# Patient Record
Sex: MALE | Race: WHITE | Employment: UNEMPLOYED | URBAN - METROPOLITAN AREA
[De-identification: names, ages, dates, MRNs, and addresses within clinical notes are randomized per-mention and may not be internally consistent; named-entity substitution may affect disease eponyms.]

---

## 2017-01-10 RX ORDER — LEVOTHYROXINE SODIUM 88 UG/1
TABLET ORAL
Qty: 30 TABLET | Refills: 4 | Status: SHIPPED | OUTPATIENT
Start: 2017-01-10 | End: 2017-03-03 | Stop reason: DRUGHIGH

## 2017-01-31 ENCOUNTER — OFFICE VISIT (OUTPATIENT)
Dept: INTERNAL MEDICINE CLINIC | Age: 52
End: 2017-01-31

## 2017-01-31 VITALS
BODY MASS INDEX: 23.79 KG/M2 | SYSTOLIC BLOOD PRESSURE: 136 MMHG | WEIGHT: 138.6 LBS | HEART RATE: 72 BPM | DIASTOLIC BLOOD PRESSURE: 88 MMHG

## 2017-01-31 DIAGNOSIS — Z79.01 CHRONIC ANTICOAGULATION: ICD-10-CM

## 2017-01-31 DIAGNOSIS — D68.59 THROMBOPHILIA (HCC): ICD-10-CM

## 2017-01-31 DIAGNOSIS — Z86.711 HISTORY OF PULMONARY EMBOLISM: ICD-10-CM

## 2017-01-31 DIAGNOSIS — F72 MENTAL RETARDATION, SEVERE (I.Q. 20-34): Primary | ICD-10-CM

## 2017-01-31 DIAGNOSIS — R47.01 MUTISM: ICD-10-CM

## 2017-01-31 PROCEDURE — 99213 OFFICE O/P EST LOW 20 MIN: CPT | Performed by: INTERNAL MEDICINE

## 2017-03-01 DIAGNOSIS — R79.89 LOW TSH LEVEL: ICD-10-CM

## 2017-03-01 DIAGNOSIS — Z79.01 CHRONIC ANTICOAGULATION: ICD-10-CM

## 2017-03-02 ENCOUNTER — TELEPHONE (OUTPATIENT)
Dept: INTERNAL MEDICINE CLINIC | Age: 52
End: 2017-03-02

## 2017-03-03 ENCOUNTER — OFFICE VISIT (OUTPATIENT)
Dept: INTERNAL MEDICINE CLINIC | Age: 52
End: 2017-03-03

## 2017-03-03 VITALS
SYSTOLIC BLOOD PRESSURE: 112 MMHG | BODY MASS INDEX: 23.55 KG/M2 | RESPIRATION RATE: 16 BRPM | WEIGHT: 137.2 LBS | DIASTOLIC BLOOD PRESSURE: 70 MMHG | HEART RATE: 56 BPM

## 2017-03-03 DIAGNOSIS — Z79.01 CHRONIC ANTICOAGULATION: ICD-10-CM

## 2017-03-03 DIAGNOSIS — Q90.9 DOWN'S SYNDROME: ICD-10-CM

## 2017-03-03 DIAGNOSIS — D68.59 THROMBOPHILIA (HCC): ICD-10-CM

## 2017-03-03 DIAGNOSIS — E03.9 HYPOTHYROIDISM, UNSPECIFIED TYPE: Primary | ICD-10-CM

## 2017-03-03 DIAGNOSIS — Z86.718 HISTORY OF DVT (DEEP VEIN THROMBOSIS): ICD-10-CM

## 2017-03-03 DIAGNOSIS — R47.01 MUTISM: ICD-10-CM

## 2017-03-03 DIAGNOSIS — Z86.711 HISTORY OF PULMONARY EMBOLISM: ICD-10-CM

## 2017-03-03 PROCEDURE — 99214 OFFICE O/P EST MOD 30 MIN: CPT | Performed by: INTERNAL MEDICINE

## 2017-03-03 RX ORDER — LEVOTHYROXINE SODIUM 88 UG/1
TABLET ORAL
COMMUNITY
End: 2017-08-17 | Stop reason: SDUPTHER

## 2017-05-17 ENCOUNTER — TELEPHONE (OUTPATIENT)
Dept: INTERNAL MEDICINE CLINIC | Age: 52
End: 2017-05-17

## 2017-05-18 ENCOUNTER — OFFICE VISIT (OUTPATIENT)
Dept: INTERNAL MEDICINE CLINIC | Age: 52
End: 2017-05-18

## 2017-05-18 VITALS
SYSTOLIC BLOOD PRESSURE: 110 MMHG | HEART RATE: 68 BPM | DIASTOLIC BLOOD PRESSURE: 70 MMHG | RESPIRATION RATE: 16 BRPM | BODY MASS INDEX: 23.1 KG/M2 | WEIGHT: 134.6 LBS

## 2017-05-18 DIAGNOSIS — Z86.711 HISTORY OF PULMONARY EMBOLISM: ICD-10-CM

## 2017-05-18 DIAGNOSIS — R47.01 MUTISM: ICD-10-CM

## 2017-05-18 DIAGNOSIS — E03.9 HYPOTHYROIDISM, UNSPECIFIED TYPE: Primary | ICD-10-CM

## 2017-05-18 DIAGNOSIS — D68.59 THROMBOPHILIA (HCC): ICD-10-CM

## 2017-05-18 DIAGNOSIS — F72 MENTAL RETARDATION, SEVERE (I.Q. 20-34): ICD-10-CM

## 2017-05-18 DIAGNOSIS — Z79.01 CHRONIC ANTICOAGULATION: ICD-10-CM

## 2017-05-18 PROCEDURE — 99213 OFFICE O/P EST LOW 20 MIN: CPT | Performed by: INTERNAL MEDICINE

## 2017-06-12 RX ORDER — LEVOTHYROXINE SODIUM 88 UG/1
TABLET ORAL
Qty: 30 TABLET | Refills: 3 | Status: SHIPPED | OUTPATIENT
Start: 2017-06-12 | End: 2017-09-19 | Stop reason: SDUPTHER

## 2017-06-12 RX ORDER — RIVAROXABAN 20 MG/1
TABLET, FILM COATED ORAL
Qty: 30 TABLET | Refills: 4 | Status: SHIPPED | OUTPATIENT
Start: 2017-06-12 | End: 2017-10-18 | Stop reason: SDUPTHER

## 2017-07-07 DIAGNOSIS — E03.9 HYPOTHYROIDISM, UNSPECIFIED TYPE: ICD-10-CM

## 2017-08-17 ENCOUNTER — TELEPHONE (OUTPATIENT)
Dept: INTERNAL MEDICINE CLINIC | Age: 52
End: 2017-08-17

## 2017-08-17 ENCOUNTER — HOSPITAL ENCOUNTER (OUTPATIENT)
Dept: GENERAL RADIOLOGY | Age: 52
Discharge: HOME | End: 2017-08-17
Attending: INTERNAL MEDICINE | Admitting: INTERNAL MEDICINE

## 2017-08-17 ENCOUNTER — OFFICE VISIT (OUTPATIENT)
Dept: INTERNAL MEDICINE CLINIC | Age: 52
End: 2017-08-17

## 2017-08-17 VITALS
BODY MASS INDEX: 22.83 KG/M2 | SYSTOLIC BLOOD PRESSURE: 112 MMHG | DIASTOLIC BLOOD PRESSURE: 80 MMHG | RESPIRATION RATE: 16 BRPM | WEIGHT: 133 LBS | HEART RATE: 80 BPM

## 2017-08-17 DIAGNOSIS — H01.009 SEBORRHEIC BLEPHARITIS, UNSPECIFIED LATERALITY: Primary | ICD-10-CM

## 2017-08-17 DIAGNOSIS — R53.83 FATIGUE, UNSPECIFIED TYPE: ICD-10-CM

## 2017-08-17 DIAGNOSIS — Z11.4 SCREENING FOR HIV (HUMAN IMMUNODEFICIENCY VIRUS): ICD-10-CM

## 2017-08-17 DIAGNOSIS — E03.9 HYPOTHYROIDISM, UNSPECIFIED TYPE: ICD-10-CM

## 2017-08-17 DIAGNOSIS — Z79.01 CHRONIC ANTICOAGULATION: ICD-10-CM

## 2017-08-17 DIAGNOSIS — H10.13 ALLERGIC CONJUNCTIVITIS, BILATERAL: ICD-10-CM

## 2017-08-17 DIAGNOSIS — Z11.59 ENCOUNTER FOR HEPATITIS C SCREENING TEST FOR LOW RISK PATIENT: ICD-10-CM

## 2017-08-17 DIAGNOSIS — L21.9 SEBORRHEA: ICD-10-CM

## 2017-08-17 LAB
ALBUMIN SERPL-MCNC: 3.9 GM/DL (ref 3.4–5)
ALP BLD-CCNC: 83 IU/L (ref 40–128)
ALT SERPL-CCNC: 20 U/L (ref 10–40)
ANION GAP SERPL CALCULATED.3IONS-SCNC: 13 MMOL/L (ref 4–16)
AST SERPL-CCNC: 21 IU/L (ref 15–37)
BASOPHILS ABSOLUTE: 0.1 K/CU MM
BASOPHILS RELATIVE PERCENT: 1 % (ref 0–1)
BILIRUB SERPL-MCNC: 0.4 MG/DL (ref 0–1)
BUN BLDV-MCNC: 9 MG/DL (ref 6–23)
CALCIUM SERPL-MCNC: 8.8 MG/DL (ref 8.3–10.6)
CHLORIDE BLD-SCNC: 97 MMOL/L (ref 99–110)
CO2: 29 MMOL/L (ref 21–32)
CREAT SERPL-MCNC: 0.9 MG/DL (ref 0.9–1.3)
DIFFERENTIAL TYPE: ABNORMAL
EOSINOPHILS ABSOLUTE: 0 K/CU MM
EOSINOPHILS RELATIVE PERCENT: 0.4 % (ref 0–3)
GFR AFRICAN AMERICAN: >60 ML/MIN/1.73M2
GFR NON-AFRICAN AMERICAN: >60 ML/MIN/1.73M2
GLUCOSE BLD-MCNC: 93 MG/DL (ref 70–140)
HCT VFR BLD CALC: 45.8 % (ref 42–52)
HEMOGLOBIN: 15.7 GM/DL (ref 13.5–18)
HEPATITIS C ANTIBODY: NON REACTIVE
IMMATURE NEUTROPHIL %: 0.4 % (ref 0–0.43)
LYMPHOCYTES ABSOLUTE: 1.1 K/CU MM
LYMPHOCYTES RELATIVE PERCENT: 21.4 % (ref 24–44)
MCH RBC QN AUTO: 34.5 PG (ref 27–31)
MCHC RBC AUTO-ENTMCNC: 34.3 % (ref 32–36)
MCV RBC AUTO: 100.7 FL (ref 78–100)
MONOCYTES ABSOLUTE: 0.4 K/CU MM
MONOCYTES RELATIVE PERCENT: 8.8 % (ref 0–4)
NUCLEATED RBC %: 0 %
PDW BLD-RTO: 13.2 % (ref 11.7–14.9)
PLATELET # BLD: 341 K/CU MM (ref 140–440)
PMV BLD AUTO: 9.8 FL (ref 7.5–11.1)
POTASSIUM SERPL-SCNC: 5 MMOL/L (ref 3.5–5.1)
RBC # BLD: 4.55 M/CU MM (ref 4.6–6.2)
SEGMENTED NEUTROPHILS ABSOLUTE COUNT: 3.3 K/CU MM
SEGMENTED NEUTROPHILS RELATIVE PERCENT: 68 % (ref 36–66)
SODIUM BLD-SCNC: 139 MMOL/L (ref 135–145)
TOTAL IMMATURE NEUTOROPHIL: 0.02 K/CU MM
TOTAL NUCLEATED RBC: 0 K/CU MM
TOTAL PROTEIN: 7 GM/DL (ref 6.4–8.2)
WBC # BLD: 4.9 K/CU MM (ref 4–10.5)

## 2017-08-17 PROCEDURE — 99214 OFFICE O/P EST MOD 30 MIN: CPT | Performed by: INTERNAL MEDICINE

## 2017-08-17 RX ORDER — DESONIDE 0.5 MG/G
CREAM TOPICAL
Qty: 1 TUBE | Refills: 1 | Status: SHIPPED | OUTPATIENT
Start: 2017-08-17 | End: 2017-08-24 | Stop reason: ALTCHOICE

## 2017-08-17 RX ORDER — OLOPATADINE HYDROCHLORIDE 1 MG/ML
1 SOLUTION/ DROPS OPHTHALMIC 2 TIMES DAILY
Qty: 1 BOTTLE | Refills: 0 | Status: SHIPPED | OUTPATIENT
Start: 2017-08-17 | End: 2017-08-24 | Stop reason: CLARIF

## 2017-08-17 RX ORDER — TOBRAMYCIN AND DEXAMETHASONE 3; 1 MG/ML; MG/ML
1 SUSPENSION/ DROPS OPHTHALMIC 3 TIMES DAILY
Qty: 1 BOTTLE | Refills: 0 | Status: SHIPPED | OUTPATIENT
Start: 2017-08-17 | End: 2017-08-27

## 2017-08-18 LAB — HIV SCREEN: NON REACTIVE

## 2017-08-24 ENCOUNTER — OFFICE VISIT (OUTPATIENT)
Dept: INTERNAL MEDICINE CLINIC | Age: 52
End: 2017-08-24

## 2017-08-24 VITALS
WEIGHT: 134.4 LBS | RESPIRATION RATE: 16 BRPM | BODY MASS INDEX: 23.07 KG/M2 | HEART RATE: 56 BPM | DIASTOLIC BLOOD PRESSURE: 74 MMHG | SYSTOLIC BLOOD PRESSURE: 126 MMHG

## 2017-08-24 DIAGNOSIS — F72 MENTAL RETARDATION, SEVERE (I.Q. 20-34): ICD-10-CM

## 2017-08-24 DIAGNOSIS — E07.9 THYROID DISEASE: ICD-10-CM

## 2017-08-24 DIAGNOSIS — L21.9 SEBORRHEA: Primary | ICD-10-CM

## 2017-08-24 DIAGNOSIS — R47.01 MUTISM: ICD-10-CM

## 2017-08-24 DIAGNOSIS — H01.009 SEBORRHEIC BLEPHARITIS, UNSPECIFIED LATERALITY: ICD-10-CM

## 2017-08-24 PROCEDURE — 99213 OFFICE O/P EST LOW 20 MIN: CPT | Performed by: INTERNAL MEDICINE

## 2017-08-24 RX ORDER — KETOCONAZOLE 20 MG/G
CREAM TOPICAL
Qty: 30 G | Refills: 1 | Status: SHIPPED | OUTPATIENT
Start: 2017-08-24 | End: 2018-08-07 | Stop reason: ALTCHOICE

## 2017-09-19 RX ORDER — LEVOTHYROXINE SODIUM 88 UG/1
TABLET ORAL
Qty: 30 TABLET | Refills: 2 | Status: SHIPPED | OUTPATIENT
Start: 2017-09-19 | End: 2018-01-11 | Stop reason: SDUPTHER

## 2017-10-11 ENCOUNTER — TELEPHONE (OUTPATIENT)
Dept: INTERNAL MEDICINE CLINIC | Age: 52
End: 2017-10-11

## 2017-10-11 RX ORDER — OLOPATADINE HYDROCHLORIDE 1 MG/ML
1 SOLUTION/ DROPS OPHTHALMIC 2 TIMES DAILY
Qty: 1 BOTTLE | Refills: 0 | Status: SHIPPED | OUTPATIENT
Start: 2017-10-11 | End: 2017-11-10

## 2017-10-11 NOTE — TELEPHONE ENCOUNTER
Flor Galloway called from [facility] stating that patient's eyes are red again. She states that this comes and goes, thinks allergy related. Please advise.

## 2017-10-19 RX ORDER — RIVAROXABAN 20 MG/1
TABLET, FILM COATED ORAL
Qty: 30 TABLET | Refills: 3 | Status: SHIPPED | OUTPATIENT
Start: 2017-10-19 | End: 2018-03-06 | Stop reason: SDUPTHER

## 2017-11-17 ENCOUNTER — OFFICE VISIT (OUTPATIENT)
Dept: INTERNAL MEDICINE CLINIC | Age: 52
End: 2017-11-17

## 2017-11-17 VITALS
DIASTOLIC BLOOD PRESSURE: 88 MMHG | WEIGHT: 136.4 LBS | BODY MASS INDEX: 23.41 KG/M2 | SYSTOLIC BLOOD PRESSURE: 134 MMHG | RESPIRATION RATE: 16 BRPM | HEART RATE: 68 BPM

## 2017-11-17 DIAGNOSIS — F72 MENTAL RETARDATION, SEVERE (I.Q. 20-34): ICD-10-CM

## 2017-11-17 DIAGNOSIS — E03.9 HYPOTHYROIDISM, UNSPECIFIED TYPE: ICD-10-CM

## 2017-11-17 DIAGNOSIS — Z86.718 HISTORY OF DVT IN ADULTHOOD: Primary | ICD-10-CM

## 2017-11-17 DIAGNOSIS — R47.01 MUTISM: ICD-10-CM

## 2017-11-17 PROCEDURE — 99213 OFFICE O/P EST LOW 20 MIN: CPT | Performed by: INTERNAL MEDICINE

## 2017-11-17 RX ORDER — M-VIT,TX,IRON,MINS/CALC/FOLIC 27MG-0.4MG
1 TABLET ORAL DAILY
COMMUNITY
End: 2020-08-20 | Stop reason: SDUPTHER

## 2017-11-17 NOTE — PROGRESS NOTES
12/2012    Coumadin    Mutism     Pulmonary embolism ([facility] [address]     Questionable; before 2005    Thrombophilia ([facility] [address] 12/2012    High Factor VIII; anticardiolipin IgA; MTHFR mutation heterozygous        Social History   Substance Use Topics    Smoking status: Never Smoker    Smokeless tobacco: Never Used    Alcohol use No        ROS: The patient has had no headache, sore throat, fever or chills, cough, dyspnea, chest pain, nausea, vomiting or diarrhea, or edema. Objective:      /88   Pulse 68   Resp 16   Wt 136 lb 6.4 oz (61.9 kg)   BMI 23.41 kg/m²    General: in no apparent distress   The patient's neck is free of nodes. Lungs are clear. Heart is normal in rate and regular in rhythm. Legs are free of edema. No rash or erythema. Aug lab rev'd  CXR from Aug rev'd and normal     Assessment / Plan:      1. History of DVT in adulthood    2. Hypothyroidism, unspecified type    3. Down's Syndrome    4.  Mutism            Plan   His last colonoscopy was with Dr Bob Hi in 2008 for hematochezia; normal  Will be due next yr    Same meds  Papers from caregiver reviewed and completed  RTC 3 mo

## 2018-01-11 RX ORDER — LEVOTHYROXINE SODIUM 88 UG/1
88 TABLET ORAL DAILY
Qty: 30 TABLET | Refills: 2 | Status: SHIPPED | OUTPATIENT
Start: 2018-01-11 | End: 2018-03-06 | Stop reason: SDUPTHER

## 2018-02-15 ENCOUNTER — OFFICE VISIT (OUTPATIENT)
Dept: INTERNAL MEDICINE CLINIC | Age: 53
End: 2018-02-15

## 2018-02-15 VITALS
WEIGHT: 137.2 LBS | DIASTOLIC BLOOD PRESSURE: 70 MMHG | HEART RATE: 64 BPM | SYSTOLIC BLOOD PRESSURE: 128 MMHG | BODY MASS INDEX: 23.55 KG/M2 | RESPIRATION RATE: 16 BRPM

## 2018-02-15 DIAGNOSIS — I82.4Y2 ACUTE DEEP VEIN THROMBOSIS (DVT) OF PROXIMAL VEIN OF LEFT LOWER EXTREMITY (HCC): ICD-10-CM

## 2018-02-15 DIAGNOSIS — R47.01 MUTISM: ICD-10-CM

## 2018-02-15 DIAGNOSIS — D68.59 THROMBOPHILIA (HCC): Primary | ICD-10-CM

## 2018-02-15 DIAGNOSIS — F72 MENTAL RETARDATION, SEVERE (I.Q. 20-34): ICD-10-CM

## 2018-02-15 DIAGNOSIS — Z86.711 HISTORY OF PULMONARY EMBOLISM: ICD-10-CM

## 2018-02-15 DIAGNOSIS — Z79.01 CHRONIC ANTICOAGULATION: ICD-10-CM

## 2018-02-15 PROCEDURE — 99213 OFFICE O/P EST LOW 20 MIN: CPT | Performed by: INTERNAL MEDICINE

## 2018-02-15 NOTE — PROGRESS NOTES
Subjective:      Gemini Pool is a 45 y.o. male who presents today for follow up on his chronic medical conditions as noted below. Patient Active Problem List:     Down's Syndrome     Hypothyroidism     Mutism     Colon cancer screening     Thrombophilia ([facility] [address]     Prostate cancer screening     Chronic anticoagulation     The patient is taking all meds as prescribed without side effects. Functional status and activity level is unchanged. There are no new complaints. No behavioral problems    Eating well and no elimination problems    Patient denies any exertional chest pain, dyspnea, palpitations, syncope, orthopnea, edema or paroxysmal nocturnal dyspnea. Taking xarelto w/o bleeding or bruising   No leg edema    No symptoms of hypo or hyperthyroidism: no decreased or increased weight, no feeling cold/chilly or excessively warm, no diarrhea or constipation, no undue sweatiness, anxiety or palpitations. Lasts TSH 0.876 in 5/2017    Discussed with caregiver Susi to ask his POA about screening colonoscopy   Last done 9/2008  She agreed to do so    Current Outpatient Prescriptions   Medication Sig Dispense Refill    levothyroxine (SYNTHROID) 88 MCG tablet Take 1 tablet by mouth daily 30 tablet 2    Multiple Vitamins-Minerals (THERAPEUTIC MULTIVITAMIN-MINERALS) tablet Take 1 tablet by mouth daily      XARELTO 20 MG TABS tablet TAKE 1 TABLET BY MOUTH ONCE DAILY WITH BREAKFAST 30 tablet 3    ketoconazole (NIZORAL) 2 % cream Apply topically to cheeks, brow every other day for the month of September; begin Sept 2 30 g 1    Multiple Vitamins-Minerals (MULTIVITAL PO) daily.  psyllium (METAMUCIL SMOOTH TEXTURE) 28 % packet Take 1 packet by mouth daily. Take with full glass of h20 or juice        No current facility-administered medications for this visit.           Past Medical History:   Diagnosis Date    Chronic anticoagulation 12/2012    Deviated septum     Down's Syndrome     Severe MRDD, Nonverbal

## 2018-03-06 RX ORDER — LEVOTHYROXINE SODIUM 88 UG/1
TABLET ORAL
Qty: 60 TABLET | Refills: 1 | Status: SHIPPED | OUTPATIENT
Start: 2018-03-06 | End: 2018-07-18 | Stop reason: SDUPTHER

## 2018-03-06 RX ORDER — RIVAROXABAN 20 MG/1
TABLET, FILM COATED ORAL
Qty: 30 TABLET | Refills: 2 | Status: SHIPPED | OUTPATIENT
Start: 2018-03-06 | End: 2018-05-30 | Stop reason: SDUPTHER

## 2018-04-02 RX ORDER — MULTIVITAMIN,THERAPEUTIC
TABLET ORAL
Qty: 30 TABLET | Refills: 3 | Status: SHIPPED | OUTPATIENT
Start: 2018-04-02 | End: 2018-07-18 | Stop reason: SDUPTHER

## 2018-05-10 ENCOUNTER — OFFICE VISIT (OUTPATIENT)
Dept: INTERNAL MEDICINE CLINIC | Age: 53
End: 2018-05-10

## 2018-05-10 VITALS
RESPIRATION RATE: 16 BRPM | WEIGHT: 141.6 LBS | BODY MASS INDEX: 24.17 KG/M2 | HEART RATE: 60 BPM | SYSTOLIC BLOOD PRESSURE: 108 MMHG | DIASTOLIC BLOOD PRESSURE: 62 MMHG | HEIGHT: 64 IN

## 2018-05-10 DIAGNOSIS — Z12.5 PROSTATE CANCER SCREENING: ICD-10-CM

## 2018-05-10 DIAGNOSIS — Z79.01 CHRONIC ANTICOAGULATION: ICD-10-CM

## 2018-05-10 DIAGNOSIS — Z23 NEED FOR TDAP VACCINATION: ICD-10-CM

## 2018-05-10 DIAGNOSIS — E78.2 MIXED HYPERLIPIDEMIA: ICD-10-CM

## 2018-05-10 DIAGNOSIS — E07.9 THYROID DISEASE: ICD-10-CM

## 2018-05-10 DIAGNOSIS — R47.01 MUTISM: ICD-10-CM

## 2018-05-10 DIAGNOSIS — D68.59 THROMBOPHILIA (HCC): ICD-10-CM

## 2018-05-10 DIAGNOSIS — Z86.711 HISTORY OF PULMONARY EMBOLISM: ICD-10-CM

## 2018-05-10 DIAGNOSIS — F72 MENTAL RETARDATION, SEVERE (I.Q. 20-34): Primary | ICD-10-CM

## 2018-05-10 PROCEDURE — 99213 OFFICE O/P EST LOW 20 MIN: CPT | Performed by: INTERNAL MEDICINE

## 2018-05-10 PROCEDURE — 90471 IMMUNIZATION ADMIN: CPT | Performed by: INTERNAL MEDICINE

## 2018-05-10 PROCEDURE — 90715 TDAP VACCINE 7 YRS/> IM: CPT | Performed by: INTERNAL MEDICINE

## 2018-05-10 ASSESSMENT — ENCOUNTER SYMPTOMS
SPUTUM PRODUCTION: 0
BLURRED VISION: 0
ABDOMINAL PAIN: 0
VOMITING: 0
COUGH: 0
SORE THROAT: 0
DOUBLE VISION: 0
HEARTBURN: 0
SHORTNESS OF BREATH: 0
BACK PAIN: 0
NAUSEA: 0
DIARRHEA: 0

## 2018-05-31 RX ORDER — RIVAROXABAN 20 MG/1
TABLET, FILM COATED ORAL
Qty: 30 TABLET | Refills: 1 | Status: SHIPPED | OUTPATIENT
Start: 2018-05-31 | End: 2018-07-18 | Stop reason: SDUPTHER

## 2018-07-18 RX ORDER — MULTIVITAMIN,THERAPEUTIC
TABLET ORAL
Qty: 90 TABLET | Refills: 2 | Status: SHIPPED | OUTPATIENT
Start: 2018-07-18 | End: 2018-08-07 | Stop reason: SDUPTHER

## 2018-07-18 RX ORDER — RIVAROXABAN 20 MG/1
TABLET, FILM COATED ORAL
Qty: 30 TABLET | Refills: 0 | Status: SHIPPED | OUTPATIENT
Start: 2018-07-18 | End: 2018-08-21 | Stop reason: SDUPTHER

## 2018-07-18 RX ORDER — LEVOTHYROXINE SODIUM 88 UG/1
TABLET ORAL
Qty: 30 TABLET | Refills: 0 | Status: SHIPPED | OUTPATIENT
Start: 2018-07-18 | End: 2018-08-07 | Stop reason: DRUGHIGH

## 2018-08-07 ENCOUNTER — OFFICE VISIT (OUTPATIENT)
Dept: INTERNAL MEDICINE CLINIC | Age: 53
End: 2018-08-07

## 2018-08-07 VITALS
DIASTOLIC BLOOD PRESSURE: 80 MMHG | WEIGHT: 140 LBS | OXYGEN SATURATION: 98 % | RESPIRATION RATE: 16 BRPM | HEART RATE: 70 BPM | SYSTOLIC BLOOD PRESSURE: 118 MMHG | BODY MASS INDEX: 24.03 KG/M2

## 2018-08-07 DIAGNOSIS — E07.9 THYROID DISEASE: Primary | ICD-10-CM

## 2018-08-07 DIAGNOSIS — F72 MENTAL RETARDATION, SEVERE (I.Q. 20-34): ICD-10-CM

## 2018-08-07 DIAGNOSIS — R47.01 MUTISM: ICD-10-CM

## 2018-08-07 DIAGNOSIS — Z79.01 CHRONIC ANTICOAGULATION: ICD-10-CM

## 2018-08-07 DIAGNOSIS — D68.59 THROMBOPHILIA (HCC): ICD-10-CM

## 2018-08-07 PROCEDURE — 99213 OFFICE O/P EST LOW 20 MIN: CPT | Performed by: INTERNAL MEDICINE

## 2018-08-07 RX ORDER — LEVOTHYROXINE SODIUM 88 UG/1
TABLET ORAL
COMMUNITY
End: 2019-08-30 | Stop reason: ALTCHOICE

## 2018-08-07 ASSESSMENT — PATIENT HEALTH QUESTIONNAIRE - PHQ9
2. FEELING DOWN, DEPRESSED OR HOPELESS: 0
SUM OF ALL RESPONSES TO PHQ9 QUESTIONS 1 & 2: 0
1. LITTLE INTEREST OR PLEASURE IN DOING THINGS: 0
SUM OF ALL RESPONSES TO PHQ QUESTIONS 1-9: 0

## 2018-08-07 NOTE — PROGRESS NOTES
Subjective:      Malka Guzman is a 52 y.o. male who presents today for follow up on his chronic medical conditions as noted below. Patient Active Problem List:     Down's Syndrome     Hypothyroidism     Mutism     Colon cancer screening     Thrombophilia ([facility] [address]     Prostate cancer screening     Chronic anticoagulation     History of pulmonary embolism     Left leg DVT ([facility] [address]     He was last seen 3 mo ago    Susi brought him in today and states he is working well with no behavior issues    No symptoms of hypo or hyperthyroidism: no decreased or increased weight, no feeling cold/chilly or excessively warm, no diarrhea or constipation, no undue sweatiness, anxiety or palpitations. TSH in Aug 4.44    No bruising or bleeding from chronic anticaog for thrombophilia and DVT hx    No leg edema    Current Outpatient Prescriptions   Medication Sig Dispense Refill    levothyroxine (SYNTHROID) 88 MCG tablet One tablet daily, except one half tablet Mondays      XARELTO 20 MG TABS tablet TAKE 1 TABLET BY MOUTH ONCE DAILY WITH BREAKFAST 30 tablet 0    Multiple Vitamins-Minerals (THERAPEUTIC MULTIVITAMIN-MINERALS) tablet Take 1 tablet by mouth daily      psyllium (METAMUCIL SMOOTH TEXTURE) 28 % packet Take 1 packet by mouth daily. Take with full glass of h20 or juice        No current facility-administered medications for this visit.         Past Medical History:   Diagnosis Date    Chronic anticoagulation 12/2012    Deviated septum     Down's Syndrome     Severe MRDD, Nonverbal    Flexural eczema 7/2014    elocon    Hypothyroidism     Left leg DVT ([facility] [address] 12/2012    Coumadin    Mutism     Pulmonary embolism ([facility] [address]     Questionable; before 2005    Thrombophilia ([facility] [address] 12/2012    High Factor VIII; anticardiolipin IgA; MTHFR mutation heterozygous        Social History   Substance Use Topics    Smoking status: Never Smoker    Smokeless tobacco: Never Used    Alcohol use No        ROS: The patient has had no headache,

## 2018-08-21 RX ORDER — RIVAROXABAN 20 MG/1
TABLET, FILM COATED ORAL
Qty: 30 TABLET | Refills: 3 | Status: SHIPPED | OUTPATIENT
Start: 2018-08-21 | End: 2018-12-11 | Stop reason: SDUPTHER

## 2018-08-21 RX ORDER — LEVOTHYROXINE SODIUM 88 UG/1
TABLET ORAL
Qty: 30 TABLET | Refills: 3 | Status: SHIPPED | OUTPATIENT
Start: 2018-08-21 | End: 2018-10-19 | Stop reason: SDUPTHER

## 2018-10-19 RX ORDER — LEVOTHYROXINE SODIUM 88 UG/1
TABLET ORAL
Qty: 90 TABLET | Refills: 1 | Status: SHIPPED | OUTPATIENT
Start: 2018-10-19 | End: 2018-11-01 | Stop reason: SDUPTHER

## 2018-11-01 ENCOUNTER — OFFICE VISIT (OUTPATIENT)
Dept: INTERNAL MEDICINE CLINIC | Age: 53
End: 2018-11-01
Payer: MEDICAID

## 2018-11-01 VITALS
OXYGEN SATURATION: 100 % | WEIGHT: 143 LBS | SYSTOLIC BLOOD PRESSURE: 112 MMHG | DIASTOLIC BLOOD PRESSURE: 70 MMHG | BODY MASS INDEX: 24.55 KG/M2 | HEART RATE: 68 BPM | RESPIRATION RATE: 16 BRPM

## 2018-11-01 DIAGNOSIS — Z79.01 CHRONIC ANTICOAGULATION: ICD-10-CM

## 2018-11-01 DIAGNOSIS — E07.9 THYROID DISEASE: ICD-10-CM

## 2018-11-01 DIAGNOSIS — Z86.718 HISTORY OF DVT OF LOWER EXTREMITY: ICD-10-CM

## 2018-11-01 DIAGNOSIS — F72 MENTAL RETARDATION, SEVERE (I.Q. 20-34): ICD-10-CM

## 2018-11-01 DIAGNOSIS — H10.9 CONJUNCTIVITIS OF RIGHT EYE, UNSPECIFIED CONJUNCTIVITIS TYPE: Primary | ICD-10-CM

## 2018-11-01 PROCEDURE — 99213 OFFICE O/P EST LOW 20 MIN: CPT | Performed by: INTERNAL MEDICINE

## 2018-11-01 RX ORDER — TOBRAMYCIN AND DEXAMETHASONE 3; 1 MG/ML; MG/ML
1 SUSPENSION/ DROPS OPHTHALMIC 4 TIMES DAILY
Qty: 1 BOTTLE | Refills: 0 | Status: SHIPPED | OUTPATIENT
Start: 2018-11-01 | End: 2018-11-08

## 2018-11-01 NOTE — PROGRESS NOTES
Coumadin    Mutism     Pulmonary embolism (HCC)     Questionable; before 2005    Thrombophilia ([facility] [address] 12/2012    High Factor VIII; anticardiolipin IgA; MTHFR mutation heterozygous        Social History   Substance Use Topics    Smoking status: Never Smoker    Smokeless tobacco: Never Used    Alcohol use No        ROS: The patient has had no headache, sore throat, fever or chills, cough, dyspnea, chest pain, nausea, vomiting or diarrhea, or edema. Objective:      /70   Pulse 68   Resp 16   Wt 143 lb (64.9 kg)   SpO2 100%   BMI 24.55 kg/m²    General: in no apparent distress   Eyes as above; left normal  The patient's neck is free of nodes. Lungs are clear. Heart is normal in rate and regular in rhythm. Legs are free of edema. No rash or erythema. Aug lab rev'd  Assessment / Plan:      1. Conjunctivitis of right eye, unspecified conjunctivitis type    2. Hypothyroidism    3. Down's Syndrome    4. History of DVT of lower extremity    5.  Chronic anticoagulation            Plan   tobradex 1 gtt right eye QID x 7d  Same meds otherwise  RTC 3 mo

## 2018-12-11 RX ORDER — RIVAROXABAN 20 MG/1
TABLET, FILM COATED ORAL
Qty: 30 TABLET | Refills: 2 | Status: SHIPPED | OUTPATIENT
Start: 2018-12-11 | End: 2019-03-11 | Stop reason: SDUPTHER

## 2019-02-08 ENCOUNTER — OFFICE VISIT (OUTPATIENT)
Dept: INTERNAL MEDICINE CLINIC | Age: 54
End: 2019-02-08
Payer: MEDICAID

## 2019-02-08 VITALS
BODY MASS INDEX: 24.55 KG/M2 | HEART RATE: 69 BPM | SYSTOLIC BLOOD PRESSURE: 124 MMHG | WEIGHT: 143 LBS | DIASTOLIC BLOOD PRESSURE: 80 MMHG | RESPIRATION RATE: 16 BRPM | OXYGEN SATURATION: 98 %

## 2019-02-08 DIAGNOSIS — H04.123 DRY EYES: Primary | ICD-10-CM

## 2019-02-08 DIAGNOSIS — D68.59 THROMBOPHILIA (HCC): ICD-10-CM

## 2019-02-08 DIAGNOSIS — Z86.718 HISTORY OF DVT (DEEP VEIN THROMBOSIS): ICD-10-CM

## 2019-02-08 DIAGNOSIS — F72 MENTAL RETARDATION, SEVERE (I.Q. 20-34): ICD-10-CM

## 2019-02-08 DIAGNOSIS — E07.9 THYROID DISEASE: ICD-10-CM

## 2019-02-08 PROCEDURE — 99213 OFFICE O/P EST LOW 20 MIN: CPT | Performed by: INTERNAL MEDICINE

## 2019-03-11 RX ORDER — RIVAROXABAN 20 MG/1
TABLET, FILM COATED ORAL
Qty: 30 TABLET | Refills: 1 | Status: SHIPPED | OUTPATIENT
Start: 2019-03-11 | End: 2019-05-28 | Stop reason: SDUPTHER

## 2019-03-29 RX ORDER — MULTIVITAMIN,THERAPEUTIC
TABLET ORAL
Qty: 90 TABLET | Refills: 1 | Status: SHIPPED | OUTPATIENT
Start: 2019-03-29 | End: 2019-09-17 | Stop reason: SDUPTHER

## 2019-05-14 ENCOUNTER — OFFICE VISIT (OUTPATIENT)
Dept: INTERNAL MEDICINE CLINIC | Age: 54
End: 2019-05-14
Payer: MEDICAID

## 2019-05-14 VITALS
DIASTOLIC BLOOD PRESSURE: 82 MMHG | WEIGHT: 138.8 LBS | BODY MASS INDEX: 23.7 KG/M2 | HEIGHT: 64 IN | SYSTOLIC BLOOD PRESSURE: 136 MMHG | RESPIRATION RATE: 14 BRPM

## 2019-05-14 DIAGNOSIS — Z86.718 HISTORY OF DVT (DEEP VEIN THROMBOSIS): ICD-10-CM

## 2019-05-14 DIAGNOSIS — Z12.5 PROSTATE CANCER SCREENING: ICD-10-CM

## 2019-05-14 DIAGNOSIS — Z79.01 CHRONIC ANTICOAGULATION: ICD-10-CM

## 2019-05-14 DIAGNOSIS — E07.9 THYROID DISEASE: Primary | ICD-10-CM

## 2019-05-14 DIAGNOSIS — E78.2 MIXED HYPERLIPIDEMIA: ICD-10-CM

## 2019-05-14 PROCEDURE — 99213 OFFICE O/P EST LOW 20 MIN: CPT | Performed by: INTERNAL MEDICINE

## 2019-05-14 NOTE — PROGRESS NOTES
Subjective:      Ruthy Brice is a 52 y.o. male who presents today for follow up on his chronic medical conditions as noted below. Patient Active Problem List:     Down's Syndrome     Hypothyroidism     Mutism     Thrombophilia (HCC)     Chronic anticoagulation     History of pulmonary embolism     Left leg DVT ([address]   '  He was last seen in Feb    HE HAD HIS EARS CLEANED OUT BY ENT    No symptoms of hypo or hyperthyroidism: no decreased or increased weight, no feeling cold/chilly or excessively warm, no diarrhea or constipation, no undue sweatiness, anxiety or palpitations. 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    He will use theratears PRN redness of eyes    No leg pain, swelling or redness    No bleeding or bruising from chronci anticoag    Current Outpatient Medications   Medication Sig Dispense Refill    Multiple Vitamin (THERA/BETA-CAROTENE) TABS TAKE ONE (1) TABLET BY MOUTH ONCE DAILY 90 tablet 1    XARELTO 20 MG TABS tablet TAKE ONE (1) TABLET BY MOUTH ONCE DAILY WITH BREAKFAST 30 tablet 1    Carboxymethylcellulose Sodium (THERATEARS) 0.25 % SOLN Apply 1 drop to eye as needed ONE DROP EACH EYE 4 TIMES DAILY       levothyroxine (SYNTHROID) 88 MCG tablet One tablet daily, except one half tablet Mondays      Multiple Vitamins-Minerals (THERAPEUTIC MULTIVITAMIN-MINERALS) tablet Take 1 tablet by mouth daily      psyllium (METAMUCIL SMOOTH TEXTURE) 28 % packet Take 1 packet by mouth daily. Take with full glass of h20 or juice        No current facility-administered medications for this visit.             Past Medical History:   Diagnosis Date    Chronic anticoagulation 12/2012    Deviated septum     Down's Syndrome     Severe MRDD, Nonverbal    Flexural eczema 7/2014    elocon    Hypothyroidism     Left leg DVT ([address] 12/2012    Coumadin    Mutism     Pulmonary embolism (HCC)     Questionable; before 2005    Thrombophilia ([address] 12/2012    High Factor VIII; anticardiolipin IgA; MTHFR mutation heterozygous        Social History     Tobacco Use    Smoking status: Never Smoker    Smokeless tobacco: Never Used   Substance Use Topics    Alcohol use: No        ROS: The patient has had no headache, sore throat, fever or chills, cough, dyspnea, chest pain, nausea, vomiting or diarrhea, or edema. Objective:      /82 (Site: Right Upper Arm, Position: Sitting, Cuff Size: Medium Adult)   Resp 14   Ht 5' 4\" (1.626 m)   Wt 138 lb 12.8 oz (63 kg)   BMI 23.82 kg/m²      Physical Exam   Constitutional: He is oriented to person, place, and time. He appears well-developed and well-nourished. No distress. HENT:   Head: Normocephalic and atraumatic. Mouth/Throat: Oropharynx is clear and moist.   Eyes: Conjunctivae are normal. No scleral icterus. Neck: Neck supple. Cardiovascular: Normal rate and regular rhythm. No murmur heard. Pulmonary/Chest: Effort normal. No respiratory distress. He has no wheezes. He has no rales. Abdominal: Soft. He exhibits no distension. There is no tenderness. Musculoskeletal: Normal range of motion. Neurological: He is alert and oriented to person, place, and time. Coordination normal.   Skin: Skin is warm and dry. Psychiatric: He has a normal mood and affect. His behavior is normal.   Vitals reviewed. Labs from 8/2018 on chart and rev'd     Assessment / Plan:      1. Hypothyroidism    2. History of DVT (deep vein thrombosis)    3. Mixed hyperlipidemia    4. Chronic anticoagulation    5.  Prostate cancer screening            Plan   Same meds  RTC 3 mo, lab first  Orders Placed This Encounter   Procedures    CBC Auto Differential    Comprehensive Metabolic Panel    Lipid Panel    TSH without Reflex    Psa screening

## 2019-05-28 RX ORDER — RIVAROXABAN 20 MG/1
TABLET, FILM COATED ORAL
Qty: 30 TABLET | Refills: 0 | Status: SHIPPED | OUTPATIENT
Start: 2019-05-28 | End: 2019-06-21 | Stop reason: SDUPTHER

## 2019-06-24 RX ORDER — RIVAROXABAN 20 MG/1
TABLET, FILM COATED ORAL
Qty: 30 TABLET | Refills: 0 | Status: SHIPPED | OUTPATIENT
Start: 2019-06-24 | End: 2019-07-22 | Stop reason: SDUPTHER

## 2019-06-24 RX ORDER — LEVOTHYROXINE SODIUM 88 UG/1
TABLET ORAL
Qty: 90 TABLET | Refills: 0 | Status: SHIPPED | OUTPATIENT
Start: 2019-06-24 | End: 2019-08-21 | Stop reason: SDUPTHER

## 2019-07-22 RX ORDER — RIVAROXABAN 20 MG/1
TABLET, FILM COATED ORAL
Qty: 30 TABLET | Refills: 0 | Status: SHIPPED | OUTPATIENT
Start: 2019-07-22 | End: 2019-08-21 | Stop reason: SDUPTHER

## 2019-08-21 RX ORDER — RIVAROXABAN 20 MG/1
TABLET, FILM COATED ORAL
Qty: 30 TABLET | Refills: 0 | Status: SHIPPED | OUTPATIENT
Start: 2019-08-21 | End: 2019-09-17 | Stop reason: SDUPTHER

## 2019-08-21 RX ORDER — LEVOTHYROXINE SODIUM 88 UG/1
TABLET ORAL
Qty: 90 TABLET | Refills: 0 | Status: SHIPPED | OUTPATIENT
Start: 2019-08-21 | End: 2019-08-30

## 2019-08-30 ENCOUNTER — OFFICE VISIT (OUTPATIENT)
Dept: INTERNAL MEDICINE CLINIC | Age: 54
End: 2019-08-30
Payer: MEDICAID

## 2019-08-30 VITALS
WEIGHT: 143.6 LBS | RESPIRATION RATE: 16 BRPM | BODY MASS INDEX: 24.65 KG/M2 | OXYGEN SATURATION: 98 % | DIASTOLIC BLOOD PRESSURE: 76 MMHG | HEART RATE: 67 BPM | SYSTOLIC BLOOD PRESSURE: 132 MMHG

## 2019-08-30 DIAGNOSIS — E03.9 HYPOTHYROIDISM, UNSPECIFIED TYPE: Primary | ICD-10-CM

## 2019-08-30 DIAGNOSIS — R47.01 MUTISM: ICD-10-CM

## 2019-08-30 DIAGNOSIS — Z86.718 HISTORY OF DVT (DEEP VEIN THROMBOSIS): ICD-10-CM

## 2019-08-30 DIAGNOSIS — F72 SEVERE INTELLECTUAL DISABILITY WITH INTELLIGENCE QUOTIENT 20 TO 34: ICD-10-CM

## 2019-08-30 PROCEDURE — 99213 OFFICE O/P EST LOW 20 MIN: CPT | Performed by: INTERNAL MEDICINE

## 2019-08-30 RX ORDER — LEVOTHYROXINE SODIUM 0.1 MG/1
100 TABLET ORAL DAILY
Qty: 30 TABLET | Refills: 5 | Status: SHIPPED | OUTPATIENT
Start: 2019-08-30 | End: 2020-02-04

## 2019-08-30 ASSESSMENT — PATIENT HEALTH QUESTIONNAIRE - PHQ9
1. LITTLE INTEREST OR PLEASURE IN DOING THINGS: 0
SUM OF ALL RESPONSES TO PHQ9 QUESTIONS 1 & 2: 0
SUM OF ALL RESPONSES TO PHQ QUESTIONS 1-9: 0
SUM OF ALL RESPONSES TO PHQ QUESTIONS 1-9: 0
2. FEELING DOWN, DEPRESSED OR HOPELESS: 0

## 2019-09-17 RX ORDER — MULTIVITAMIN,THERAPEUTIC
TABLET ORAL
Qty: 90 TABLET | Refills: 0 | Status: SHIPPED | OUTPATIENT
Start: 2019-09-17 | End: 2019-12-13 | Stop reason: SDUPTHER

## 2019-09-17 RX ORDER — RIVAROXABAN 20 MG/1
TABLET, FILM COATED ORAL
Qty: 30 TABLET | Refills: 0 | Status: SHIPPED | OUTPATIENT
Start: 2019-09-17 | End: 2019-10-18 | Stop reason: SDUPTHER

## 2019-11-22 ENCOUNTER — OFFICE VISIT (OUTPATIENT)
Dept: INTERNAL MEDICINE CLINIC | Age: 54
End: 2019-11-22
Payer: MEDICAID

## 2019-11-22 VITALS
WEIGHT: 146.2 LBS | BODY MASS INDEX: 24.96 KG/M2 | SYSTOLIC BLOOD PRESSURE: 122 MMHG | HEIGHT: 64 IN | DIASTOLIC BLOOD PRESSURE: 70 MMHG | OXYGEN SATURATION: 96 % | HEART RATE: 73 BPM

## 2019-11-22 DIAGNOSIS — E07.9 THYROID DISEASE: Primary | ICD-10-CM

## 2019-11-22 DIAGNOSIS — F72 SEVERE INTELLECTUAL DISABILITY WITH INTELLIGENCE QUOTIENT 20 TO 34: ICD-10-CM

## 2019-11-22 DIAGNOSIS — Z86.711 HISTORY OF PULMONARY EMBOLISM: ICD-10-CM

## 2019-11-22 DIAGNOSIS — R47.01 MUTISM: ICD-10-CM

## 2019-11-22 PROCEDURE — 99213 OFFICE O/P EST LOW 20 MIN: CPT | Performed by: INTERNAL MEDICINE

## 2019-12-13 RX ORDER — MULTIVITAMIN,THERAPEUTIC
TABLET ORAL
Qty: 90 TABLET | Refills: 0 | Status: SHIPPED | OUTPATIENT
Start: 2019-12-13 | End: 2020-03-30

## 2019-12-13 RX ORDER — RIVAROXABAN 20 MG/1
TABLET, FILM COATED ORAL
Qty: 30 TABLET | Refills: 0 | Status: SHIPPED | OUTPATIENT
Start: 2019-12-13 | End: 2020-01-07

## 2020-01-07 RX ORDER — RIVAROXABAN 20 MG/1
TABLET, FILM COATED ORAL
Qty: 30 TABLET | Refills: 0 | Status: SHIPPED | OUTPATIENT
Start: 2020-01-07 | End: 2020-02-04

## 2020-02-04 RX ORDER — RIVAROXABAN 20 MG/1
TABLET, FILM COATED ORAL
Qty: 30 TABLET | Refills: 0 | Status: SHIPPED | OUTPATIENT
Start: 2020-02-04 | End: 2020-03-05

## 2020-02-04 RX ORDER — LEVOTHYROXINE SODIUM 0.1 MG/1
TABLET ORAL
Qty: 90 TABLET | Refills: 4 | Status: SHIPPED | OUTPATIENT
Start: 2020-02-04 | End: 2020-08-20 | Stop reason: SDUPTHER

## 2020-02-20 ENCOUNTER — OFFICE VISIT (OUTPATIENT)
Dept: INTERNAL MEDICINE CLINIC | Age: 55
End: 2020-02-20
Payer: MEDICAID

## 2020-02-20 VITALS
SYSTOLIC BLOOD PRESSURE: 128 MMHG | WEIGHT: 146.2 LBS | OXYGEN SATURATION: 97 % | HEART RATE: 62 BPM | BODY MASS INDEX: 25.08 KG/M2 | DIASTOLIC BLOOD PRESSURE: 78 MMHG

## 2020-02-20 PROCEDURE — 99213 OFFICE O/P EST LOW 20 MIN: CPT | Performed by: FAMILY MEDICINE

## 2020-02-20 ASSESSMENT — PATIENT HEALTH QUESTIONNAIRE - PHQ9
SUM OF ALL RESPONSES TO PHQ QUESTIONS 1-9: 0
1. LITTLE INTEREST OR PLEASURE IN DOING THINGS: 0
2. FEELING DOWN, DEPRESSED OR HOPELESS: 0
SUM OF ALL RESPONSES TO PHQ QUESTIONS 1-9: 0
SUM OF ALL RESPONSES TO PHQ9 QUESTIONS 1 & 2: 0

## 2020-02-20 NOTE — PROGRESS NOTES
Subjective:      Chief Complaint   Patient presents with    Other     follow up       HPI:  Magnus Salomon is a 62 y.o. male who presents today to establish care with new provider. Caretaker present with patient. PMH as below. No acute concerns today. Past Medical History:   Diagnosis Date    Chronic anticoagulation 12/2012    Deviated septum     Down's Syndrome     Severe MRDD, Nonverbal    Flexural eczema 7/2014    elocon    Hypothyroidism     Left leg DVT ([facility] [address] 12/2012    Coumadin    Mutism     Pulmonary embolism (HCC)     Questionable; before 2005    Thrombophilia ([facility] [address] 12/2012    High Factor VIII; anticardiolipin IgA; MTHFR mutation heterozygous        No past surgical history on file. Social History     Tobacco Use    Smoking status: Never Smoker    Smokeless tobacco: Never Used   Substance Use Topics    Alcohol use: No        Review of Systems   Unable to perform ROS: Patient nonverbal        Prior to Visit Medications    Medication Sig Taking? Authorizing Provider   XARELTO 20 MG TABS tablet TAKE 1 TABLET BY MOUTH ONCE DAILY WITH BREAKFAST Yes Lilly Fleischer, MD   levothyroxine (SYNTHROID) 100 MCG tablet TAKE 1 TABLET BY MOUTH ONCE DAILY Yes Lilly Fleischer, MD   Multiple Vitamin (THERA/BETA-CAROTENE) TABS TAKE ONE (1) TABLET BY MOUTH ONCE DAILY Yes Lilly Fleischer, MD   Multiple Vitamins-Minerals (THERAPEUTIC MULTIVITAMIN-MINERALS) tablet Take 1 tablet by mouth daily Yes Historical Provider, MD   psyllium (METAMUCIL SMOOTH TEXTURE) 28 % packet Take 1 packet by mouth daily. Take with full glass of h20 or juice  Yes Historical Provider, MD          Objective:      /78   Pulse 62   Wt 146 lb 3.2 oz (66.3 kg)   SpO2 97%   BMI 25.08 kg/m²      Physical Exam  Vitals signs and nursing note reviewed. Constitutional:       General: He is not in acute distress. Appearance: Normal appearance. He is not ill-appearing or toxic-appearing.    HENT:      Head: Normocephalic and atraumatic. Right Ear: External ear normal.      Left Ear: External ear normal.      Nose: Nose normal.      Mouth/Throat:      Pharynx: Oropharynx is clear. Eyes:      General: No scleral icterus. Right eye: No discharge. Left eye: No discharge. Extraocular Movements: Extraocular movements intact. Conjunctiva/sclera: Conjunctivae normal.   Neck:      Musculoskeletal: Normal range of motion and neck supple. No neck rigidity. Cardiovascular:      Rate and Rhythm: Normal rate and regular rhythm. Heart sounds: Normal heart sounds. Pulmonary:      Effort: Pulmonary effort is normal.      Breath sounds: Normal breath sounds. No wheezing or rales. Musculoskeletal:         General: No deformity. Skin:     General: Skin is warm and dry. Findings: No rash. Neurological:      General: No focal deficit present. Mental Status: He is alert. Mental status is at baseline. Motor: No weakness. Psychiatric:         Mood and Affect: Mood normal.         Behavior: Behavior normal.            Assessment / Plan:      1. General medical exam  Up to date on screening. Paperwork completed. Follow up in 3 months. 2. Hypothyroidism  Last TSH wnl. Continue current dose of synthroid. Patient voiced understanding and agreement with plan. All questions/concerns were addressed, risks/side effects of medications were reviewed. Return precautions and after visit summary were provided. Return in about 3 months (around 5/20/2020). or earlier as needed.       Elroy Leslie MD

## 2020-03-05 RX ORDER — RIVAROXABAN 20 MG/1
TABLET, FILM COATED ORAL
Qty: 30 TABLET | Refills: 0 | Status: SHIPPED | OUTPATIENT
Start: 2020-03-05 | End: 2020-03-30

## 2020-03-30 RX ORDER — MULTIVITAMIN,THERAPEUTIC
TABLET ORAL
Qty: 90 TABLET | Refills: 0 | Status: SHIPPED | OUTPATIENT
Start: 2020-03-30 | End: 2020-06-22

## 2020-03-30 RX ORDER — RIVAROXABAN 20 MG/1
TABLET, FILM COATED ORAL
Qty: 30 TABLET | Refills: 0 | Status: SHIPPED | OUTPATIENT
Start: 2020-03-30 | End: 2020-04-28

## 2020-04-28 RX ORDER — RIVAROXABAN 20 MG/1
TABLET, FILM COATED ORAL
Qty: 30 TABLET | Refills: 0 | Status: SHIPPED | OUTPATIENT
Start: 2020-04-28 | End: 2020-05-28

## 2020-05-28 RX ORDER — RIVAROXABAN 20 MG/1
TABLET, FILM COATED ORAL
Qty: 30 TABLET | Refills: 0 | Status: SHIPPED | OUTPATIENT
Start: 2020-05-28 | End: 2020-06-22

## 2020-06-22 RX ORDER — MULTIVITAMIN,THERAPEUTIC
TABLET ORAL
Qty: 90 TABLET | Refills: 0 | Status: SHIPPED | OUTPATIENT
Start: 2020-06-22 | End: 2020-08-20 | Stop reason: SDUPTHER

## 2020-06-22 RX ORDER — RIVAROXABAN 20 MG/1
TABLET, FILM COATED ORAL
Qty: 30 TABLET | Refills: 3 | Status: SHIPPED | OUTPATIENT
Start: 2020-06-22 | End: 2020-08-20 | Stop reason: SDUPTHER

## 2020-08-20 ENCOUNTER — OFFICE VISIT (OUTPATIENT)
Dept: INTERNAL MEDICINE CLINIC | Age: 55
End: 2020-08-20
Payer: MEDICAID

## 2020-08-20 VITALS
WEIGHT: 144.6 LBS | OXYGEN SATURATION: 96 % | SYSTOLIC BLOOD PRESSURE: 132 MMHG | DIASTOLIC BLOOD PRESSURE: 80 MMHG | BODY MASS INDEX: 24.81 KG/M2 | HEART RATE: 71 BPM | TEMPERATURE: 98.1 F

## 2020-08-20 PROCEDURE — 99214 OFFICE O/P EST MOD 30 MIN: CPT | Performed by: FAMILY MEDICINE

## 2020-08-20 RX ORDER — MULTIVITAMIN,THERAPEUTIC
TABLET ORAL
Qty: 90 TABLET | Refills: 0 | Status: SHIPPED | OUTPATIENT
Start: 2020-08-20 | End: 2020-11-19 | Stop reason: ALTCHOICE

## 2020-08-20 RX ORDER — M-VIT,TX,IRON,MINS/CALC/FOLIC 27MG-0.4MG
1 TABLET ORAL DAILY
Qty: 90 TABLET | Refills: 3 | Status: SHIPPED | OUTPATIENT
Start: 2020-08-20 | End: 2020-11-19 | Stop reason: SDUPTHER

## 2020-08-20 RX ORDER — LEVOTHYROXINE SODIUM 0.1 MG/1
TABLET ORAL
Qty: 90 TABLET | Refills: 4 | Status: SHIPPED | OUTPATIENT
Start: 2020-08-20 | End: 2020-11-19 | Stop reason: SDUPTHER

## 2020-08-20 NOTE — PROGRESS NOTES
distress. Appearance: Normal appearance. He is not ill-appearing or toxic-appearing. HENT:      Head: Normocephalic and atraumatic. Right Ear: External ear normal.      Left Ear: External ear normal.      Nose: Nose normal.      Mouth/Throat:      Pharynx: Oropharynx is clear. Eyes:      General: No scleral icterus. Right eye: No discharge. Left eye: No discharge. Extraocular Movements: Extraocular movements intact. Conjunctiva/sclera: Conjunctivae normal.   Neck:      Musculoskeletal: Normal range of motion and neck supple. No neck rigidity. Cardiovascular:      Rate and Rhythm: Normal rate and regular rhythm. Heart sounds: Normal heart sounds. Pulmonary:      Effort: Pulmonary effort is normal.      Breath sounds: Normal breath sounds. No wheezing or rales. Musculoskeletal:         General: No deformity. Skin:     General: Skin is warm and dry. Findings: No rash. Neurological:      General: No focal deficit present. Mental Status: He is alert. Mental status is at baseline. Motor: No weakness. Psychiatric:         Mood and Affect: Mood normal.         Behavior: Behavior normal.            Assessment / Plan:      1. General medical exam  Due for labs. Paperwork completed. - CBC Auto Differential; Future  - Comprehensive Metabolic Panel; Future  - Lipid Panel; Future    2. Hypothyroidism, unspecified type  Last TSH wnl. Will monitor labs, continue current dose of synthroid. - TSH without Reflex; Future          Patient voiced understanding and agreement with plan. All questions/concerns were addressed, risks/side effects of medications were reviewed. Return precautions and after visit summary were provided. Return in about 3 months (around 11/20/2020). or earlier as needed.       Nirmal Nassar MD

## 2020-11-03 ENCOUNTER — HOSPITAL ENCOUNTER (OUTPATIENT)
Age: 55
Discharge: HOME | End: 2020-11-03
Payer: MEDICAID

## 2020-11-03 LAB
ALBUMIN SERPL-MCNC: 4 GM/DL (ref 3.4–5)
ALP BLD-CCNC: 72 IU/L (ref 40–128)
ALT SERPL-CCNC: 17 U/L (ref 10–40)
ANION GAP SERPL CALCULATED.3IONS-SCNC: 8 MMOL/L (ref 4–16)
AST SERPL-CCNC: 20 IU/L (ref 15–37)
BASOPHILS ABSOLUTE: 0.1 K/CU MM
BASOPHILS RELATIVE PERCENT: 0.9 % (ref 0–1)
BILIRUB SERPL-MCNC: 0.3 MG/DL (ref 0–1)
BUN BLDV-MCNC: 13 MG/DL (ref 6–23)
CALCIUM SERPL-MCNC: 9.1 MG/DL (ref 8.3–10.6)
CHLORIDE BLD-SCNC: 97 MMOL/L (ref 99–110)
CHOLESTEROL: 166 MG/DL
CO2: 29 MMOL/L (ref 21–32)
CREAT SERPL-MCNC: 0.9 MG/DL (ref 0.9–1.3)
DIFFERENTIAL TYPE: ABNORMAL
EOSINOPHILS ABSOLUTE: 0 K/CU MM
EOSINOPHILS RELATIVE PERCENT: 0.6 % (ref 0–3)
GFR AFRICAN AMERICAN: >60 ML/MIN/1.73M2
GFR NON-AFRICAN AMERICAN: >60 ML/MIN/1.73M2
GLUCOSE BLD-MCNC: 96 MG/DL (ref 70–99)
HCT VFR BLD CALC: 47.9 % (ref 42–52)
HDLC SERPL-MCNC: 59 MG/DL
HEMOGLOBIN: 15.8 GM/DL (ref 13.5–18)
IMMATURE NEUTROPHIL %: 0.6 % (ref 0–0.43)
LDL CHOLESTEROL DIRECT: 103 MG/DL
LYMPHOCYTES ABSOLUTE: 1.1 K/CU MM
LYMPHOCYTES RELATIVE PERCENT: 21 % (ref 24–44)
MCH RBC QN AUTO: 33.5 PG (ref 27–31)
MCHC RBC AUTO-ENTMCNC: 33 % (ref 32–36)
MCV RBC AUTO: 101.7 FL (ref 78–100)
MONOCYTES ABSOLUTE: 0.6 K/CU MM
MONOCYTES RELATIVE PERCENT: 11.5 % (ref 0–4)
NUCLEATED RBC %: 0 %
PDW BLD-RTO: 13 % (ref 11.7–14.9)
PLATELET # BLD: 373 K/CU MM (ref 140–440)
PMV BLD AUTO: 9.5 FL (ref 7.5–11.1)
POTASSIUM SERPL-SCNC: 5.4 MMOL/L (ref 3.5–5.1)
RBC # BLD: 4.71 M/CU MM (ref 4.6–6.2)
SEGMENTED NEUTROPHILS ABSOLUTE COUNT: 3.5 K/CU MM
SEGMENTED NEUTROPHILS RELATIVE PERCENT: 65.4 % (ref 36–66)
SODIUM BLD-SCNC: 134 MMOL/L (ref 135–145)
TOTAL IMMATURE NEUTOROPHIL: 0.03 K/CU MM
TOTAL NUCLEATED RBC: 0 K/CU MM
TOTAL PROTEIN: 6.4 GM/DL (ref 6.4–8.2)
TRIGL SERPL-MCNC: 73 MG/DL
TSH HIGH SENSITIVITY: 1.4 UIU/ML (ref 0.27–4.2)
WBC # BLD: 5.4 K/CU MM (ref 4–10.5)

## 2020-11-03 PROCEDURE — 80053 COMPREHEN METABOLIC PANEL: CPT

## 2020-11-03 PROCEDURE — 83721 ASSAY OF BLOOD LIPOPROTEIN: CPT

## 2020-11-03 PROCEDURE — 36415 COLL VENOUS BLD VENIPUNCTURE: CPT

## 2020-11-03 PROCEDURE — 80061 LIPID PANEL: CPT

## 2020-11-03 PROCEDURE — 84443 ASSAY THYROID STIM HORMONE: CPT

## 2020-11-03 PROCEDURE — 85025 COMPLETE CBC W/AUTO DIFF WBC: CPT

## 2020-11-19 ENCOUNTER — OFFICE VISIT (OUTPATIENT)
Dept: INTERNAL MEDICINE CLINIC | Age: 55
End: 2020-11-19
Payer: MEDICAID

## 2020-11-19 VITALS
HEART RATE: 68 BPM | SYSTOLIC BLOOD PRESSURE: 130 MMHG | TEMPERATURE: 97.9 F | DIASTOLIC BLOOD PRESSURE: 80 MMHG | BODY MASS INDEX: 25.08 KG/M2 | OXYGEN SATURATION: 98 % | WEIGHT: 146.2 LBS

## 2020-11-19 PROCEDURE — 99214 OFFICE O/P EST MOD 30 MIN: CPT | Performed by: FAMILY MEDICINE

## 2020-11-19 RX ORDER — LEVOTHYROXINE SODIUM 0.1 MG/1
TABLET ORAL
Qty: 90 TABLET | Refills: 3 | Status: SHIPPED | OUTPATIENT
Start: 2020-11-19 | End: 2021-02-22 | Stop reason: SDUPTHER

## 2020-11-19 RX ORDER — M-VIT,TX,IRON,MINS/CALC/FOLIC 27MG-0.4MG
1 TABLET ORAL DAILY
Qty: 90 TABLET | Refills: 3 | Status: SHIPPED | OUTPATIENT
Start: 2020-11-19 | End: 2020-11-19 | Stop reason: ALTCHOICE

## 2020-11-19 NOTE — PROGRESS NOTES
Subjective:      Chief Complaint   Patient presents with    3 Month Follow-Up    Results     labs    Other     dry skin around both ears around neck worse on left today       HPI:  Becky Armando is a 60 y.o. male who presents today for follow up of chronic conditions as listed below. Has had issues with frequent bowel movements for a long time since being started on metamucil. Would like to try discontinuing as it is interfering with his daily functioning. Has had a rash under his ears bilaterally. Comes and goes, does not seem to bother him. Group home is looking to transition to on site on call and caretaker is requesting documentation that patient needs 24 hour supervision. Otherwise doing well. No acute concerns. Labs reviewed. Past Medical History:   Diagnosis Date    Chronic anticoagulation 12/2012    Deviated septum     Down's Syndrome     Severe MRDD, Nonverbal    Flexural eczema 7/2014    elocon    Hypothyroidism     Left leg DVT ([facility] [address] 12/2012    Coumadin    Mutism     Pulmonary embolism (HCC)     Questionable; before 2005    Thrombophilia ([facility] [address] 12/2012    High Factor VIII; anticardiolipin IgA; MTHFR mutation heterozygous        No past surgical history on file. Social History     Tobacco Use    Smoking status: Never Smoker    Smokeless tobacco: Never Used   Substance Use Topics    Alcohol use: No        Review of Systems   Constitutional: Negative for activity change, appetite change, chills, fever and unexpected weight change. HENT: Negative for congestion and sore throat. Respiratory: Negative for chest tightness and shortness of breath. Cardiovascular: Negative for chest pain and palpitations. Gastrointestinal: Negative for abdominal pain, constipation, diarrhea and vomiting. Genitourinary: Negative for dysuria. Skin: Positive for rash. Negative for color change. Neurological: Negative for dizziness and light-headedness. Psychiatric/Behavioral: Negative for dysphoric mood. The patient is not nervous/anxious. Prior to Visit Medications    Medication Sig Taking? Authorizing Provider   Multiple Vitamin (THERA/BETA-CAROTENE) TABS TAKE 1 TABLET BY MOUTH ONCE DAILY  Sepideh Carr MD   rivaroxaban (XARELTO) 20 MG TABS tablet TAKE 1 TABLET BY MOUTH ONCE DAILY WITH BREAKFAST  Sepideh Carr MD   levothyroxine (SYNTHROID) 100 MCG tablet TAKE 1 TABLET BY MOUTH ONCE DAILY  Sepideh Carr MD   Multiple Vitamins-Minerals (THERAPEUTIC MULTIVITAMIN-MINERALS) tablet Take 1 tablet by mouth daily  Sepideh Carr MD   psyllium (METAMUCIL SMOOTH TEXTURE) 28 % packet Take 1 packet by mouth daily Take with full glass of h20 or juice  Sepideh Carr MD          Objective:      /80   Pulse 68   Temp 97.9 °F (36.6 °C)   Wt 146 lb 3.2 oz (66.3 kg)   SpO2 98%   BMI 25.08 kg/m²      Physical Exam  Vitals signs and nursing note reviewed. Constitutional:       General: He is not in acute distress. Appearance: Normal appearance. He is not ill-appearing or toxic-appearing. HENT:      Head: Normocephalic and atraumatic. Right Ear: External ear normal.      Left Ear: External ear normal.      Nose: Nose normal.      Mouth/Throat:      Pharynx: Oropharynx is clear. Eyes:      General: No scleral icterus. Right eye: No discharge. Left eye: No discharge. Extraocular Movements: Extraocular movements intact. Conjunctiva/sclera: Conjunctivae normal.   Neck:      Musculoskeletal: Normal range of motion and neck supple. No neck rigidity. Cardiovascular:      Rate and Rhythm: Normal rate and regular rhythm. Heart sounds: Normal heart sounds. Pulmonary:      Effort: Pulmonary effort is normal.      Breath sounds: Normal breath sounds. No wheezing or rales. Musculoskeletal:         General: No deformity. Skin:     General: Skin is warm and dry.       Findings: Rash (patches of dry, erythema under ears bilaterally) present. Neurological:      General: No focal deficit present. Mental Status: He is alert. Mental status is at baseline. Motor: No weakness. Psychiatric:         Mood and Affect: Mood normal.         Behavior: Behavior normal.            Assessment / Plan:      1. Frequent bowel movements  Since being started on metamucil, requesting to discontinue. May stop, contact clinic if constipation recurs. 2. Hyperkalemia  No obvious cause. Discussed avoiding high potassium foods, will try stopping MVI and repeat lab in a few weeks. - POTASSIUM; Future    3. Rash  Likely atopic dermatitis vs. psoriasis. Advised topical steroids but caretaker does not think he would tolerate topical medication well (patient does not like being touched). Reports rash does not seem to bother patient, prefers to monitor for now. 4. Hypothyroidism  Last TSH wnl, continue current dose of synthroid. - levothyroxine (SYNTHROID) 100 MCG tablet; TAKE 1 TABLET BY MOUTH ONCE DAILY  Dispense: 90 tablet; Refill: 3    5. History of pulmonary embolism  Continue xarelto. - rivaroxaban (XARELTO) 20 MG TABS tablet; TAKE 1 TABLET BY MOUTH ONCE DAILY WITH BREAKFAST  Dispense: 90 tablet; Refill: 3         I would recommend patient to have 24 hour supervision due to his medical history. Patient voiced understanding and agreement with plan. All questions/concerns were addressed, risks/side effects of medications were reviewed. Return precautions and after visit summary were provided. Return in about 3 months (around 2/19/2021). or earlier as needed.       Charles Cormier MD

## 2020-11-20 ASSESSMENT — ENCOUNTER SYMPTOMS
DIARRHEA: 0
CHEST TIGHTNESS: 0
ABDOMINAL PAIN: 0
SORE THROAT: 0
COLOR CHANGE: 0
CONSTIPATION: 0
VOMITING: 0
SHORTNESS OF BREATH: 0

## 2021-01-15 LAB — POTASSIUM (K+): 4.7

## 2021-01-20 DIAGNOSIS — E87.5 HYPERKALEMIA: ICD-10-CM

## 2021-02-22 ENCOUNTER — VIRTUAL VISIT (OUTPATIENT)
Dept: INTERNAL MEDICINE CLINIC | Age: 56
End: 2021-02-22
Payer: MEDICAID

## 2021-02-22 DIAGNOSIS — E07.9 THYROID DISEASE: Primary | ICD-10-CM

## 2021-02-22 DIAGNOSIS — E87.5 HYPERKALEMIA: ICD-10-CM

## 2021-02-22 DIAGNOSIS — Z86.711 HISTORY OF PULMONARY EMBOLISM: ICD-10-CM

## 2021-02-22 PROCEDURE — 99214 OFFICE O/P EST MOD 30 MIN: CPT | Performed by: FAMILY MEDICINE

## 2021-02-22 RX ORDER — LEVOTHYROXINE SODIUM 0.1 MG/1
TABLET ORAL
Qty: 90 TABLET | Refills: 3 | Status: SHIPPED | OUTPATIENT
Start: 2021-02-22 | End: 2021-05-20 | Stop reason: SDUPTHER

## 2021-02-22 SDOH — ECONOMIC STABILITY: FOOD INSECURITY: WITHIN THE PAST 12 MONTHS, YOU WORRIED THAT YOUR FOOD WOULD RUN OUT BEFORE YOU GOT MONEY TO BUY MORE.: NEVER TRUE

## 2021-02-22 SDOH — ECONOMIC STABILITY: INCOME INSECURITY: HOW HARD IS IT FOR YOU TO PAY FOR THE VERY BASICS LIKE FOOD, HOUSING, MEDICAL CARE, AND HEATING?: NOT HARD AT ALL

## 2021-02-22 ASSESSMENT — PATIENT HEALTH QUESTIONNAIRE - PHQ9
1. LITTLE INTEREST OR PLEASURE IN DOING THINGS: 0
SUM OF ALL RESPONSES TO PHQ QUESTIONS 1-9: 0
SUM OF ALL RESPONSES TO PHQ QUESTIONS 1-9: 0

## 2021-02-22 NOTE — PROGRESS NOTES
2021    TELEHEALTH EVALUATION -- Audio/Visual (During LXDIR-23 public health emergency)    HPI:    Leo Dunbar (:  1965) has requested an audio/video evaluation for the following concern(s):    Leo Dunbar is a 60 y.o. male who presents today for follow up of chronic conditions as listed below. Spoke with Quoc Arambula (caregiver at patient's group home). States patient has been doing well. Has discontinued his multivitamin and metamucil since his last appointment. Patient is still having regular bowel movements without any issues. Is not sure whether he has ever had a colonoscopy, [facility] patients have to get permission/authorization from their guardian for procedures like that, which tends to be difficult. No acute concerns today. Review of Systems   Unable to perform ROS: Patient nonverbal       Prior to Visit Medications    Medication Sig Taking? Authorizing Provider   rivaroxaban (XARELTO) 20 MG TABS tablet TAKE 1 TABLET BY MOUTH ONCE DAILY WITH BREAKFAST Yes Ronnie Dominguez MD   levothyroxine (SYNTHROID) 100 MCG tablet TAKE 1 TABLET BY MOUTH ONCE DAILY Yes Ronnie Dominguez MD       Social History     Tobacco Use    Smoking status: Never Smoker    Smokeless tobacco: Never Used   Substance Use Topics    Alcohol use: No    Drug use: No        Past Medical History:   Diagnosis Date    Chronic anticoagulation 2012    Deviated septum     Down's Syndrome     Severe MRDD, Nonverbal    Flexural eczema 2014    elocon    Hypothyroidism     Left leg DVT ([facility] [address] 2012    Coumadin    Mutism     Pulmonary embolism ([facility] [address]     Questionable; before 2005    Thrombophilia ([facility] [address] 2012    High Factor VIII; anticardiolipin IgA; MTHFR mutation heterozygous         ASSESSMENT/PLAN:  1. Hypothyroidism  Last TSH wnl. Will monitor labs, continue current dose of synthroid. - levothyroxine (SYNTHROID) 100 MCG tablet; TAKE 1 TABLET BY MOUTH ONCE DAILY  Dispense: 90 tablet;  Refill: 3 2. History of pulmonary embolism  - rivaroxaban (XARELTO) 20 MG TABS tablet; TAKE 1 TABLET BY MOUTH ONCE DAILY WITH BREAKFAST  Dispense: 90 tablet; Refill: 3    3. Hyperkalemia  Now normalized. Return in about 3 months (around 5/22/2021). Yared Ramos is a 60 y.o. male being evaluated by a Virtual Visit (video visit) encounter to address concerns as mentioned above. A caregiver was present when appropriate. Due to this being a TeleHealth encounter (During JAV-64 public health emergency), evaluation of the following organ systems was limited: Vitals/Constitutional/EENT/Resp/CV/GI//MS/Neuro/Skin/Heme-Lymph-Imm. Pursuant to the emergency declaration under the [address] [place] [address], [address] [place] [address] authority and the [employer] and [employer] Act, this Virtual Visit was conducted with patient's (and/or legal guardian's) consent, to reduce the patient's risk of exposure to COVID-19 and provide necessary medical care. The patient (and/or legal guardian) has also been advised to contact this office for worsening conditions or problems, and seek emergency medical treatment and/or call 911 if deemed necessary. Patient identification was verified at the start of the visit: Yes    Total time spent on this encounter: 15 min    Services were provided through a video synchronous discussion virtually to substitute for in-person clinic visit. Patient and provider were located at their individual homes. --Clifford Leiva MD on 2/22/2021 at 5:06 PM    An electronic signature was used to authenticate this note.

## 2021-05-20 ENCOUNTER — OFFICE VISIT (OUTPATIENT)
Dept: INTERNAL MEDICINE CLINIC | Age: 56
End: 2021-05-20
Payer: MEDICAID

## 2021-05-20 VITALS
OXYGEN SATURATION: 100 % | DIASTOLIC BLOOD PRESSURE: 66 MMHG | HEIGHT: 60 IN | SYSTOLIC BLOOD PRESSURE: 124 MMHG | WEIGHT: 142 LBS | TEMPERATURE: 98.1 F | HEART RATE: 63 BPM | BODY MASS INDEX: 27.88 KG/M2

## 2021-05-20 DIAGNOSIS — E07.9 THYROID DISEASE: Primary | ICD-10-CM

## 2021-05-20 DIAGNOSIS — Z00.00 GENERAL MEDICAL EXAM: ICD-10-CM

## 2021-05-20 DIAGNOSIS — Z86.711 HISTORY OF PULMONARY EMBOLISM: ICD-10-CM

## 2021-05-20 PROCEDURE — 99214 OFFICE O/P EST MOD 30 MIN: CPT | Performed by: FAMILY MEDICINE

## 2021-05-20 RX ORDER — LEVOTHYROXINE SODIUM 0.1 MG/1
TABLET ORAL
Qty: 90 TABLET | Refills: 3 | Status: SHIPPED | OUTPATIENT
Start: 2021-05-20 | End: 2021-08-17 | Stop reason: SDUPTHER

## 2021-05-20 NOTE — PROGRESS NOTES
Subjective:      Chief Complaint   Patient presents with    Annual Exam    Hypothyroidism       HPI:  Ashanti Wild is a 60 y.o. male who presents today for follow up of chronic conditions as listed below. Caregiver reports patient is doing very well, no acute issues or concerns today. No changes in medications. Not seeing any specialists. Past Medical History:   Diagnosis Date    Chronic anticoagulation 12/2012    Deviated septum     Down's Syndrome     Severe MRDD, Nonverbal    Flexural eczema 7/2014    elocon    Hypothyroidism     Left leg DVT ([facility] [address] 12/2012    Coumadin    Mutism     Pulmonary embolism (HCC)     Questionable; before 2005    Thrombophilia ([facility] [address] 12/2012    High Factor VIII; anticardiolipin IgA; MTHFR mutation heterozygous        No past surgical history on file. Social History     Tobacco Use    Smoking status: Never Smoker    Smokeless tobacco: Never Used   Substance Use Topics    Alcohol use: No        Review of Systems   Unable to perform ROS: Patient nonverbal        Prior to Visit Medications    Medication Sig Taking? Authorizing Provider   rivaroxaban (XARELTO) 20 MG TABS tablet TAKE 1 TABLET BY MOUTH ONCE DAILY WITH BREAKFAST Yes Ed Mcneil MD   levothyroxine (SYNTHROID) 100 MCG tablet TAKE 1 TABLET BY MOUTH ONCE DAILY Yes Ed Mcneil MD          Objective:      /66   Pulse 63   Temp 98.1 °F (36.7 °C)   Ht 5' (1.524 m)   Wt 142 lb (64.4 kg)   SpO2 100%   BMI 27.73 kg/m²      Physical Exam  Vitals and nursing note reviewed. Constitutional:       General: He is not in acute distress. Appearance: Normal appearance. He is not ill-appearing or toxic-appearing. HENT:      Head: Normocephalic and atraumatic. Right Ear: External ear normal.      Left Ear: External ear normal.      Nose: Nose normal.      Mouth/Throat:      Pharynx: Oropharynx is clear. Eyes:      General: No scleral icterus. Right eye: No discharge.

## 2021-08-17 ENCOUNTER — OFFICE VISIT (OUTPATIENT)
Dept: INTERNAL MEDICINE CLINIC | Age: 56
End: 2021-08-17
Payer: MEDICAID

## 2021-08-17 VITALS
OXYGEN SATURATION: 95 % | WEIGHT: 144 LBS | SYSTOLIC BLOOD PRESSURE: 124 MMHG | BODY MASS INDEX: 28.27 KG/M2 | HEART RATE: 75 BPM | DIASTOLIC BLOOD PRESSURE: 86 MMHG | HEIGHT: 60 IN | TEMPERATURE: 97.2 F

## 2021-08-17 DIAGNOSIS — E07.9 THYROID DISEASE: Primary | ICD-10-CM

## 2021-08-17 DIAGNOSIS — D68.59 THROMBOPHILIA (HCC): ICD-10-CM

## 2021-08-17 DIAGNOSIS — Z86.711 HISTORY OF PULMONARY EMBOLISM: ICD-10-CM

## 2021-08-17 DIAGNOSIS — E78.5 HYPERLIPIDEMIA, UNSPECIFIED HYPERLIPIDEMIA TYPE: ICD-10-CM

## 2021-08-17 DIAGNOSIS — Z00.00 GENERAL MEDICAL EXAM: ICD-10-CM

## 2021-08-17 PROCEDURE — 99213 OFFICE O/P EST LOW 20 MIN: CPT | Performed by: FAMILY MEDICINE

## 2021-08-17 RX ORDER — LEVOTHYROXINE SODIUM 0.1 MG/1
TABLET ORAL
Qty: 90 TABLET | Refills: 3 | Status: SHIPPED | OUTPATIENT
Start: 2021-08-17 | End: 2021-11-11 | Stop reason: SDUPTHER

## 2021-08-17 NOTE — PROGRESS NOTES
Subjective:      Chief Complaint   Patient presents with    3 Month Follow-Up       HPI:  Sera Bhandari is a 60 y.o. male who presents today for follow up of chronic conditions as listed below. Caregiver reports patient is doing well with no complaints. Patient with severe MRDD, is non verbal.      No recent changes to medications. No concerns today. Past Medical History:   Diagnosis Date    Chronic anticoagulation 12/2012    Deviated septum     Down's Syndrome     Severe MRDD, Nonverbal    Flexural eczema 7/2014    elocon    Hypothyroidism     Left leg DVT ([facility] [address] 12/2012    Coumadin    Mutism     Pulmonary embolism (HCC)     Questionable; before 2005    Thrombophilia ([facility] [address] 12/2012    High Factor VIII; anticardiolipin IgA; MTHFR mutation heterozygous        No past surgical history on file. Social History     Tobacco Use    Smoking status: Never Smoker    Smokeless tobacco: Never Used   Substance Use Topics    Alcohol use: No        Review of Systems   Unable to perform ROS: Patient nonverbal        Prior to Visit Medications    Medication Sig Taking? Authorizing Provider   rivaroxaban (XARELTO) 20 MG TABS tablet TAKE 1 TABLET BY MOUTH ONCE DAILY WITH BREAKFAST Yes Bessy Ramirez MD   levothyroxine (SYNTHROID) 100 MCG tablet TAKE 1 TABLET BY MOUTH ONCE DAILY Yes Bessy Ramirez MD          Objective:      /86 (Site: Right Upper Arm, Position: Sitting, Cuff Size: Medium Adult)   Pulse 75   Temp 97.2 °F (36.2 °C)   Ht 5' (1.524 m)   Wt 144 lb (65.3 kg)   SpO2 95%   BMI 28.12 kg/m²      Physical Exam  Vitals and nursing note reviewed. Constitutional:       General: He is not in acute distress. Appearance: Normal appearance. He is not ill-appearing or toxic-appearing. HENT:      Head: Normocephalic and atraumatic. Right Ear: External ear normal.      Left Ear: External ear normal.      Nose: Nose normal.      Mouth/Throat:      Pharynx: Oropharynx is clear. Eyes:      General: No scleral icterus. Right eye: No discharge. Left eye: No discharge. Extraocular Movements: Extraocular movements intact. Conjunctiva/sclera: Conjunctivae normal.   Cardiovascular:      Rate and Rhythm: Normal rate and regular rhythm. Heart sounds: Normal heart sounds. Pulmonary:      Effort: Pulmonary effort is normal.      Breath sounds: Normal breath sounds. No wheezing or rales. Musculoskeletal:         General: No deformity. Cervical back: Normal range of motion and neck supple. No rigidity. Skin:     General: Skin is warm and dry. Findings: No rash. Neurological:      General: No focal deficit present. Mental Status: He is alert. Mental status is at baseline. Motor: No weakness. Psychiatric:         Mood and Affect: Mood normal.         Behavior: Behavior normal.            Assessment / Plan:      Last TSH wnl. Will monitor labs, continue current dose of synthroid. - levothyroxine (SYNTHROID) 100 MCG tablet; TAKE 1 TABLET BY MOUTH ONCE DAILY  Dispense: 90 tablet; Refill: 3  - TSH without Reflex; Future    2. General medical exam  - CBC Auto Differential; Future  - Comprehensive Metabolic Panel; Future  - Lipid Panel; Future    3. Thrombophilia ([address]  Continue xarelto. - rivaroxaban (XARELTO) 20 MG TABS tablet; TAKE 1 TABLET BY MOUTH ONCE DAILY WITH BREAKFAST  Dispense: 90 tablet; Refill: 3    4. Hyperlipidemia, unspecified hyperlipidemia type  - CBC Auto Differential; Future  - Comprehensive Metabolic Panel; Future  - Lipid Panel; Future    5. History of pulmonary embolism  - rivaroxaban (XARELTO) 20 MG TABS tablet; TAKE 1 TABLET BY MOUTH ONCE DAILY WITH BREAKFAST  Dispense: 90 tablet; Refill: 3          I have spent 20 minutes on this patient encounter. Patient voiced understanding and agreement with plan. All questions/concerns were addressed, risks/side effects of medications were reviewed.   Return precautions and after visit summary were provided. Return in about 3 months (around 11/17/2021). or earlier as needed.       Triston Payan MD

## 2021-11-04 ENCOUNTER — HOSPITAL ENCOUNTER (OUTPATIENT)
Age: 56
Discharge: HOME | End: 2021-11-04
Payer: MEDICAID

## 2021-11-04 DIAGNOSIS — E07.9 THYROID DISEASE: ICD-10-CM

## 2021-11-04 DIAGNOSIS — Z00.00 GENERAL MEDICAL EXAM: ICD-10-CM

## 2021-11-04 DIAGNOSIS — E78.5 HYPERLIPIDEMIA, UNSPECIFIED HYPERLIPIDEMIA TYPE: ICD-10-CM

## 2021-11-04 LAB
ALBUMIN SERPL-MCNC: 4.1 GM/DL (ref 3.4–5)
ALP BLD-CCNC: 82 IU/L (ref 40–128)
ALT SERPL-CCNC: 22 U/L (ref 10–40)
ANION GAP SERPL CALCULATED.3IONS-SCNC: 10 MMOL/L (ref 4–16)
AST SERPL-CCNC: 19 IU/L (ref 15–37)
BASOPHILS ABSOLUTE: 0.1 K/CU MM
BASOPHILS RELATIVE PERCENT: 1.1 % (ref 0–1)
BILIRUB SERPL-MCNC: 0.3 MG/DL (ref 0–1)
BUN BLDV-MCNC: 11 MG/DL (ref 6–23)
CALCIUM SERPL-MCNC: 8.9 MG/DL (ref 8.3–10.6)
CHLORIDE BLD-SCNC: 98 MMOL/L (ref 99–110)
CHOLESTEROL: 196 MG/DL
CO2: 27 MMOL/L (ref 21–32)
CREAT SERPL-MCNC: 0.8 MG/DL (ref 0.9–1.3)
DIFFERENTIAL TYPE: ABNORMAL
EOSINOPHILS ABSOLUTE: 0.1 K/CU MM
EOSINOPHILS RELATIVE PERCENT: 1.1 % (ref 0–3)
GFR AFRICAN AMERICAN: >60 ML/MIN/1.73M2
GFR NON-AFRICAN AMERICAN: >60 ML/MIN/1.73M2
GLUCOSE BLD-MCNC: 98 MG/DL (ref 70–99)
HCT VFR BLD CALC: 47.9 % (ref 42–52)
HDLC SERPL-MCNC: 65 MG/DL
HEMOGLOBIN: 15.8 GM/DL (ref 13.5–18)
IMMATURE NEUTROPHIL %: 0.7 % (ref 0–0.43)
LDL CHOLESTEROL DIRECT: 120 MG/DL
LYMPHOCYTES ABSOLUTE: 1.4 K/CU MM
LYMPHOCYTES RELATIVE PERCENT: 23.9 % (ref 24–44)
MCH RBC QN AUTO: 33.4 PG (ref 27–31)
MCHC RBC AUTO-ENTMCNC: 33 % (ref 32–36)
MCV RBC AUTO: 101.3 FL (ref 78–100)
MONOCYTES ABSOLUTE: 0.7 K/CU MM
MONOCYTES RELATIVE PERCENT: 12 % (ref 0–4)
NUCLEATED RBC %: 0 %
PDW BLD-RTO: 13.5 % (ref 11.7–14.9)
PLATELET # BLD: 367 K/CU MM (ref 140–440)
PMV BLD AUTO: 9.8 FL (ref 7.5–11.1)
POTASSIUM SERPL-SCNC: 5.3 MMOL/L (ref 3.5–5.1)
RBC # BLD: 4.73 M/CU MM (ref 4.6–6.2)
SEGMENTED NEUTROPHILS ABSOLUTE COUNT: 3.5 K/CU MM
SEGMENTED NEUTROPHILS RELATIVE PERCENT: 61.2 % (ref 36–66)
SODIUM BLD-SCNC: 135 MMOL/L (ref 135–145)
TOTAL IMMATURE NEUTOROPHIL: 0.04 K/CU MM
TOTAL NUCLEATED RBC: 0 K/CU MM
TOTAL PROTEIN: 7.1 GM/DL (ref 6.4–8.2)
TRIGL SERPL-MCNC: 64 MG/DL
TSH HIGH SENSITIVITY: 0.59 UIU/ML (ref 0.27–4.2)
WBC # BLD: 5.7 K/CU MM (ref 4–10.5)

## 2021-11-04 PROCEDURE — 80061 LIPID PANEL: CPT

## 2021-11-04 PROCEDURE — 36415 COLL VENOUS BLD VENIPUNCTURE: CPT

## 2021-11-04 PROCEDURE — 84443 ASSAY THYROID STIM HORMONE: CPT

## 2021-11-04 PROCEDURE — 80053 COMPREHEN METABOLIC PANEL: CPT

## 2021-11-04 PROCEDURE — 83721 ASSAY OF BLOOD LIPOPROTEIN: CPT

## 2021-11-04 PROCEDURE — 85025 COMPLETE CBC W/AUTO DIFF WBC: CPT

## 2021-11-11 ENCOUNTER — OFFICE VISIT (OUTPATIENT)
Dept: INTERNAL MEDICINE CLINIC | Age: 56
End: 2021-11-11
Payer: MEDICAID

## 2021-11-11 VITALS
HEART RATE: 74 BPM | OXYGEN SATURATION: 97 % | BODY MASS INDEX: 28.51 KG/M2 | TEMPERATURE: 97.9 F | WEIGHT: 146 LBS | DIASTOLIC BLOOD PRESSURE: 68 MMHG | SYSTOLIC BLOOD PRESSURE: 128 MMHG

## 2021-11-11 DIAGNOSIS — Z86.711 HISTORY OF PULMONARY EMBOLISM: ICD-10-CM

## 2021-11-11 DIAGNOSIS — E07.9 THYROID DISEASE: ICD-10-CM

## 2021-11-11 DIAGNOSIS — D68.59 THROMBOPHILIA (HCC): ICD-10-CM

## 2021-11-11 PROCEDURE — 99213 OFFICE O/P EST LOW 20 MIN: CPT | Performed by: FAMILY MEDICINE

## 2021-11-11 RX ORDER — LEVOTHYROXINE SODIUM 0.1 MG/1
TABLET ORAL
Qty: 90 TABLET | Refills: 3 | Status: SHIPPED | OUTPATIENT
Start: 2021-11-11 | End: 2022-02-28 | Stop reason: SDUPTHER

## 2021-11-11 NOTE — PROGRESS NOTES
Subjective:      Chief Complaint   Patient presents with    Hypothyroidism       HPI:  Orquidea Epps is a 60 y.o. male who presents today for follow up of chronic conditions as listed below. Caregiver reports no concerns with patient. States he has been doing well, no complaints or symptoms. No recent changes in medications. Has had labs completed. Past Medical History:   Diagnosis Date    Chronic anticoagulation 12/2012    Deviated septum     Down's Syndrome     Severe MRDD, Nonverbal    Flexural eczema 7/2014    elocon    Hypothyroidism     Left leg DVT ([facility] [address] 12/2012    Coumadin    Mutism     Pulmonary embolism (HCC)     Questionable; before 2005    Thrombophilia ([facility] [address] 12/2012    High Factor VIII; anticardiolipin IgA; MTHFR mutation heterozygous        No past surgical history on file. Social History     Tobacco Use    Smoking status: Never Smoker    Smokeless tobacco: Never Used   Substance Use Topics    Alcohol use: No        Review of Systems   Unable to perform ROS: Patient nonverbal        Prior to Visit Medications    Medication Sig Taking? Authorizing Provider   rivaroxaban (XARELTO) 20 MG TABS tablet TAKE 1 TABLET BY MOUTH ONCE DAILY WITH BREAKFAST Yes Lori Barrow MD   levothyroxine (SYNTHROID) 100 MCG tablet TAKE 1 TABLET BY MOUTH ONCE DAILY Yes Lori Barrow MD          Objective:      /68   Pulse 74   Temp 97.9 °F (36.6 °C)   Wt 146 lb (66.2 kg)   SpO2 97%   BMI 28.51 kg/m²      Physical Exam  Vitals and nursing note reviewed. Constitutional:       General: He is not in acute distress. Appearance: Normal appearance. He is not ill-appearing or toxic-appearing. HENT:      Head: Normocephalic and atraumatic. Right Ear: External ear normal.      Left Ear: External ear normal.      Nose: Nose normal.      Mouth/Throat:      Pharynx: Oropharynx is clear. Eyes:      General: No scleral icterus. Right eye: No discharge. Left eye: No discharge. Extraocular Movements: Extraocular movements intact. Conjunctiva/sclera: Conjunctivae normal.   Cardiovascular:      Rate and Rhythm: Normal rate and regular rhythm. Heart sounds: Normal heart sounds. Pulmonary:      Effort: Pulmonary effort is normal.      Breath sounds: Normal breath sounds. No wheezing or rales. Musculoskeletal:         General: No deformity. Cervical back: Normal range of motion and neck supple. No rigidity. Skin:     General: Skin is warm and dry. Findings: No rash. Neurological:      General: No focal deficit present. Mental Status: He is alert. Mental status is at baseline. Motor: No weakness. Psychiatric:         Mood and Affect: Mood normal.         Behavior: Behavior normal.            Assessment / Plan:      1. Hypothyroidism  Last TSH wnl. Will monitor labs, continue current dose of synthroid. - levothyroxine (SYNTHROID) 100 MCG tablet; TAKE 1 TABLET BY MOUTH ONCE DAILY  Dispense: 90 tablet; Refill: 3    2. Thrombophilia ([address]  Continue xarelto. - rivaroxaban (XARELTO) 20 MG TABS tablet; TAKE 1 TABLET BY MOUTH ONCE DAILY WITH BREAKFAST  Dispense: 90 tablet; Refill: 3    3. History of pulmonary embolism  - rivaroxaban (XARELTO) 20 MG TABS tablet; TAKE 1 TABLET BY MOUTH ONCE DAILY WITH BREAKFAST  Dispense: 90 tablet; Refill: 3          I have spent 20 minutes on this patient encounter. Patient voiced understanding and agreement with plan. All questions/concerns were addressed, risks/side effects of medications were reviewed. Return precautions and after visit summary were provided. Return in about 3 months (around 2/11/2022). or earlier as needed.       Zachary Waldron MD

## 2022-02-28 ENCOUNTER — OFFICE VISIT (OUTPATIENT)
Dept: INTERNAL MEDICINE CLINIC | Age: 57
End: 2022-02-28
Payer: MEDICAID

## 2022-02-28 VITALS
OXYGEN SATURATION: 98 % | WEIGHT: 144 LBS | HEART RATE: 86 BPM | DIASTOLIC BLOOD PRESSURE: 78 MMHG | SYSTOLIC BLOOD PRESSURE: 122 MMHG | BODY MASS INDEX: 28.27 KG/M2 | TEMPERATURE: 97.9 F | HEIGHT: 60 IN

## 2022-02-28 DIAGNOSIS — Z86.711 HISTORY OF PULMONARY EMBOLISM: ICD-10-CM

## 2022-02-28 DIAGNOSIS — D68.59 THROMBOPHILIA (HCC): ICD-10-CM

## 2022-02-28 DIAGNOSIS — E07.9 THYROID DISEASE: ICD-10-CM

## 2022-02-28 PROCEDURE — 99213 OFFICE O/P EST LOW 20 MIN: CPT | Performed by: FAMILY MEDICINE

## 2022-02-28 RX ORDER — LEVOTHYROXINE SODIUM 0.1 MG/1
TABLET ORAL
Qty: 90 TABLET | Refills: 3 | Status: SHIPPED | OUTPATIENT
Start: 2022-02-28 | End: 2022-05-24 | Stop reason: SDUPTHER

## 2022-02-28 SDOH — ECONOMIC STABILITY: FOOD INSECURITY: WITHIN THE PAST 12 MONTHS, YOU WORRIED THAT YOUR FOOD WOULD RUN OUT BEFORE YOU GOT MONEY TO BUY MORE.: NEVER TRUE

## 2022-02-28 SDOH — ECONOMIC STABILITY: FOOD INSECURITY: WITHIN THE PAST 12 MONTHS, THE FOOD YOU BOUGHT JUST DIDN'T LAST AND YOU DIDN'T HAVE MONEY TO GET MORE.: NEVER TRUE

## 2022-02-28 ASSESSMENT — PATIENT HEALTH QUESTIONNAIRE - PHQ9
2. FEELING DOWN, DEPRESSED OR HOPELESS: 0
SUM OF ALL RESPONSES TO PHQ9 QUESTIONS 1 & 2: 0
SUM OF ALL RESPONSES TO PHQ QUESTIONS 1-9: 0
1. LITTLE INTEREST OR PLEASURE IN DOING THINGS: 0

## 2022-02-28 ASSESSMENT — SOCIAL DETERMINANTS OF HEALTH (SDOH): HOW HARD IS IT FOR YOU TO PAY FOR THE VERY BASICS LIKE FOOD, HOUSING, MEDICAL CARE, AND HEATING?: NOT HARD AT ALL

## 2022-02-28 NOTE — PROGRESS NOTES
Subjective:      Chief Complaint   Patient presents with    3 Month Follow-Up       HPI:  Sarina Altman is a 60 y.o. male who presents today for follow up of chronic conditions as listed below. Caregiver states patient's hearing is getting worse, but has been evaluated by 2 audiologists and has to go to [facility] to get fitted for hearing aids. Also is skeptical as to whether patient would wear the hearing aids even if he got them. Otherwise is doing well. No changes in medications. No acute concerns today. Past Medical History:   Diagnosis Date    Chronic anticoagulation 12/2012    Deviated septum     Down's Syndrome     Severe MRDD, Nonverbal    Flexural eczema 7/2014    elocon    Hypothyroidism     Left leg DVT ([facility] [address] 12/2012    Coumadin    Mutism     Pulmonary embolism (HCC)     Questionable; before 2005    Thrombophilia ([facility] [address] 12/2012    High Factor VIII; anticardiolipin IgA; MTHFR mutation heterozygous        No past surgical history on file. Social History     Tobacco Use    Smoking status: Never Smoker    Smokeless tobacco: Never Used   Substance Use Topics    Alcohol use: No        Review of Systems   Unable to perform ROS: Patient nonverbal        Prior to Visit Medications    Medication Sig Taking? Authorizing Provider   levothyroxine (SYNTHROID) 100 MCG tablet TAKE 1 TABLET BY MOUTH ONCE DAILY Yes Corey Chaves MD   rivaroxaban (XARELTO) 20 MG TABS tablet TAKE 1 TABLET BY MOUTH ONCE DAILY WITH BREAKFAST Yes Corey Chaves MD          Objective:      /78 (Site: Right Upper Arm, Position: Sitting, Cuff Size: Medium Adult)   Pulse 86   Temp 97.9 °F (36.6 °C)   Ht 5' (1.524 m)   Wt 144 lb (65.3 kg)   SpO2 98%   BMI 28.12 kg/m²      Physical Exam  Vitals and nursing note reviewed. Constitutional:       General: He is not in acute distress. Appearance: Normal appearance. He is not ill-appearing or toxic-appearing.    HENT:      Head: Normocephalic and atraumatic. Right Ear: External ear normal.      Left Ear: External ear normal.      Nose: Nose normal.      Mouth/Throat:      Pharynx: Oropharynx is clear. Eyes:      General: No scleral icterus. Right eye: No discharge. Left eye: No discharge. Extraocular Movements: Extraocular movements intact. Conjunctiva/sclera: Conjunctivae normal.   Cardiovascular:      Rate and Rhythm: Normal rate and regular rhythm. Heart sounds: Normal heart sounds. Pulmonary:      Effort: Pulmonary effort is normal.      Breath sounds: Normal breath sounds. No wheezing or rales. Musculoskeletal:         General: No deformity. Cervical back: Normal range of motion and neck supple. No rigidity. Skin:     General: Skin is warm and dry. Findings: No rash. Neurological:      General: No focal deficit present. Mental Status: He is alert. Mental status is at baseline. Motor: No weakness. Psychiatric:         Mood and Affect: Mood normal.         Behavior: Behavior normal.            Assessment / Plan:      1. Hypothyroidism  Last TSH wnl. Will monitor labs, continue current dose of synthroid. - levothyroxine (SYNTHROID) 100 MCG tablet; TAKE 1 TABLET BY MOUTH ONCE DAILY  Dispense: 90 tablet; Refill: 3  - CBC with Auto Differential; Future  - Comprehensive Metabolic Panel; Future  - TSH; Future    2. Thrombophilia ([facility] [address]  Continue xarelto. - rivaroxaban (XARELTO) 20 MG TABS tablet; TAKE 1 TABLET BY MOUTH ONCE DAILY WITH BREAKFAST  Dispense: 90 tablet; Refill: 3    3. History of pulmonary embolism  - rivaroxaban (XARELTO) 20 MG TABS tablet; TAKE 1 TABLET BY MOUTH ONCE DAILY WITH BREAKFAST  Dispense: 90 tablet; Refill: 3          I have spent 20 minutes on this patient encounter. Patient voiced understanding and agreement with plan. All questions/concerns were addressed, risks/side effects of medications were reviewed.   Return precautions and after visit summary were provided. Return in about 3 months (around 5/28/2022). or earlier as needed.       Sahil Mejia MD

## 2022-05-24 ENCOUNTER — OFFICE VISIT (OUTPATIENT)
Dept: INTERNAL MEDICINE CLINIC | Age: 57
End: 2022-05-24
Payer: MEDICAID

## 2022-05-24 VITALS
OXYGEN SATURATION: 96 % | BODY MASS INDEX: 27.73 KG/M2 | SYSTOLIC BLOOD PRESSURE: 124 MMHG | WEIGHT: 142 LBS | DIASTOLIC BLOOD PRESSURE: 70 MMHG | HEART RATE: 62 BPM

## 2022-05-24 DIAGNOSIS — E78.5 HYPERLIPIDEMIA, UNSPECIFIED HYPERLIPIDEMIA TYPE: Primary | ICD-10-CM

## 2022-05-24 DIAGNOSIS — E07.9 THYROID DISEASE: ICD-10-CM

## 2022-05-24 DIAGNOSIS — Z86.711 HISTORY OF PULMONARY EMBOLISM: ICD-10-CM

## 2022-05-24 DIAGNOSIS — M19.90 ARTHRITIS: ICD-10-CM

## 2022-05-24 DIAGNOSIS — F42.9 OBSESSIVE-COMPULSIVE DISORDER, UNSPECIFIED TYPE: ICD-10-CM

## 2022-05-24 DIAGNOSIS — D68.59 THROMBOPHILIA (HCC): ICD-10-CM

## 2022-05-24 DIAGNOSIS — G47.00 INSOMNIA, UNSPECIFIED TYPE: ICD-10-CM

## 2022-05-24 PROCEDURE — 99214 OFFICE O/P EST MOD 30 MIN: CPT | Performed by: FAMILY MEDICINE

## 2022-05-24 RX ORDER — LEVOTHYROXINE SODIUM 0.1 MG/1
TABLET ORAL
Qty: 90 TABLET | Refills: 1 | Status: SHIPPED | OUTPATIENT
Start: 2022-05-24 | End: 2022-08-09 | Stop reason: SDUPTHER

## 2022-05-24 NOTE — PROGRESS NOTES
Subjective:      Chief Complaint   Patient presents with    Insomnia    Hypothyroidism       HPI:  Martha Fitch is a 60 y.o. male who presents today for follow up of chronic conditions as listed below. Caretaker states he has been having worsening issues with sleep at night. States this has been a problem for years, but feels it is getting a little worse. Does sleep during the day, but then stays awake at night. Also feels his OCD is progressing as well. Previous tasks that would take him 30 min is now taking 1.5 hrs. States it is not interfering with his functioning or quality of life. Has also noticed that his knuckles are large. Patient does not complain of any pain or symptoms, but caregiver is wondering whether it could be due to rheumatoid arthritis. Past Medical History:   Diagnosis Date    Chronic anticoagulation 12/2012    Deviated septum     Down's Syndrome     Severe MRDD, Nonverbal    Flexural eczema 7/2014    elocon    Hypothyroidism     Left leg DVT ([facility] [address] 12/2012    Coumadin    Mutism     Pulmonary embolism (HCC)     Questionable; before 2005    Thrombophilia ([facility] [address] 12/2012    High Factor VIII; anticardiolipin IgA; MTHFR mutation heterozygous        No past surgical history on file. Social History     Tobacco Use    Smoking status: Never Smoker    Smokeless tobacco: Never Used   Substance Use Topics    Alcohol use: No        Review of Systems   Unable to perform ROS: Patient nonverbal        Prior to Visit Medications    Medication Sig Taking? Authorizing Provider   rivaroxaban (XARELTO) 20 MG TABS tablet TAKE 1 TABLET BY MOUTH ONCE DAILY WITH BREAKFAST Yes Tiarra Guillen MD   levothyroxine (SYNTHROID) 100 MCG tablet TAKE 1 TABLET BY MOUTH ONCE DAILY Yes Tiarra Guillen MD          Objective:      /70   Pulse 62   Wt 142 lb (64.4 kg)   SpO2 96%   BMI 27.73 kg/m²      Physical Exam  Vitals and nursing note reviewed.    Constitutional: General: He is not in acute distress. Appearance: Normal appearance. He is not ill-appearing or toxic-appearing. HENT:      Head: Normocephalic and atraumatic. Right Ear: External ear normal.      Left Ear: External ear normal.      Nose: Nose normal.      Mouth/Throat:      Pharynx: Oropharynx is clear. Eyes:      General: No scleral icterus. Right eye: No discharge. Left eye: No discharge. Extraocular Movements: Extraocular movements intact. Conjunctiva/sclera: Conjunctivae normal.   Cardiovascular:      Rate and Rhythm: Normal rate and regular rhythm. Heart sounds: Normal heart sounds. Pulmonary:      Effort: Pulmonary effort is normal.      Breath sounds: Normal breath sounds. No wheezing or rales. Musculoskeletal:         General: Swelling (some swelling in R 2nd MCP with ulnar deviation) present. No deformity. Cervical back: Normal range of motion and neck supple. No rigidity. Skin:     General: Skin is warm and dry. Findings: No rash. Neurological:      General: No focal deficit present. Mental Status: He is alert. Mental status is at baseline. Motor: No weakness. Psychiatric:         Mood and Affect: Mood normal.         Behavior: Behavior normal.            Assessment / Plan:      1. Hyperlipidemia, unspecified hyperlipidemia type  Will monitor.   - CBC with Auto Differential; Future  - Comprehensive Metabolic Panel; Future  - Lipid Panel; Future    2. Thrombophilia ([address]  Continue xarelto. - rivaroxaban (XARELTO) 20 MG TABS tablet; TAKE 1 TABLET BY MOUTH ONCE DAILY WITH BREAKFAST  Dispense: 90 tablet; Refill: 1    3. History of pulmonary embolism  - rivaroxaban (XARELTO) 20 MG TABS tablet; TAKE 1 TABLET BY MOUTH ONCE DAILY WITH BREAKFAST  Dispense: 90 tablet; Refill: 1    4. Hypothyroidism  Last TSH wnl. Will monitor labs, continue current dose of synthroid.    - levothyroxine (SYNTHROID) 100 MCG tablet; TAKE 1 TABLET BY MOUTH ONCE DAILY  Dispense: 90 tablet; Refill: 1  - TSH; Future    5. Arthritis  Exam may be consistent with RA, but discussed with caregiver the plan of management if diagnosis were confirmed. If they are not planning on proceeding with treatment/medication, labs will unlikely be useful. Caregiver states that it does not seem to be bothering him and would like to wait for now. 6. Insomnia, unspecified type  Advised trying melatonin to help reset circadian rhythm. Caregiver would like to monitor for now. 7. Obsessive-compulsive disorder, unspecified type  Discussed possible treatment options but caregiver feels it is not interfering with his functioning and would like to wait. Will monitor. I have spent 30 minutes on this patient encounter. Patient voiced understanding and agreement with plan. All questions/concerns were addressed, risks/side effects of medications were reviewed. Return precautions and after visit summary were provided. Return in about 3 months (around 8/24/2022). or earlier as needed.       Robles Hollins MD

## 2022-07-08 ENCOUNTER — HOSPITAL ENCOUNTER (OUTPATIENT)
Age: 57
Setting detail: SPECIMEN
Discharge: HOME | End: 2022-07-08
Payer: MEDICAID

## 2022-07-08 PROCEDURE — U0005 INFEC AGEN DETEC AMPLI PROBE: HCPCS

## 2022-07-08 PROCEDURE — U0003 INFECTIOUS AGENT DETECTION BY NUCLEIC ACID (DNA OR RNA); SEVERE ACUTE RESPIRATORY SYNDROME CORONAVIRUS 2 (SARS-COV-2) (CORONAVIRUS DISEASE [COVID-19]), AMPLIFIED PROBE TECHNIQUE, MAKING USE OF HIGH THROUGHPUT TECHNOLOGIES AS DESCRIBED BY CMS-2020-01-R: HCPCS

## 2022-07-09 LAB
SARS-COV-2: NOT DETECTED
SOURCE: NORMAL

## 2022-08-09 ENCOUNTER — OFFICE VISIT (OUTPATIENT)
Dept: FAMILY MEDICINE CLINIC | Age: 57
End: 2022-08-09
Payer: MEDICAID

## 2022-08-09 VITALS
WEIGHT: 148.2 LBS | HEART RATE: 72 BPM | DIASTOLIC BLOOD PRESSURE: 86 MMHG | BODY MASS INDEX: 29.09 KG/M2 | OXYGEN SATURATION: 96 % | HEIGHT: 60 IN | SYSTOLIC BLOOD PRESSURE: 120 MMHG

## 2022-08-09 DIAGNOSIS — E07.9 THYROID DISEASE: ICD-10-CM

## 2022-08-09 DIAGNOSIS — F72 SEVERE INTELLECTUAL DISABILITY WITH INTELLIGENCE QUOTIENT 20 TO 34: Primary | ICD-10-CM

## 2022-08-09 DIAGNOSIS — R11.10 VOMITING, INTRACTABILITY OF VOMITING NOT SPECIFIED, PRESENCE OF NAUSEA NOT SPECIFIED, UNSPECIFIED VOMITING TYPE: ICD-10-CM

## 2022-08-09 DIAGNOSIS — H61.23 IMPACTED CERUMEN OF BOTH EARS: ICD-10-CM

## 2022-08-09 DIAGNOSIS — Z12.11 SCREEN FOR COLON CANCER: ICD-10-CM

## 2022-08-09 DIAGNOSIS — Z12.5 PROSTATE CANCER SCREENING: ICD-10-CM

## 2022-08-09 DIAGNOSIS — Z23 NEED FOR COVID-19 VACCINE: ICD-10-CM

## 2022-08-09 DIAGNOSIS — Z86.711 HISTORY OF PULMONARY EMBOLISM: ICD-10-CM

## 2022-08-09 DIAGNOSIS — Z23 NEED FOR SHINGLES VACCINE: ICD-10-CM

## 2022-08-09 DIAGNOSIS — E66.3 OVERWEIGHT (BMI 25.0-29.9): ICD-10-CM

## 2022-08-09 DIAGNOSIS — F79 INTELLECTUAL DISABILITY: ICD-10-CM

## 2022-08-09 DIAGNOSIS — D68.59 THROMBOPHILIA (HCC): ICD-10-CM

## 2022-08-09 PROCEDURE — 99214 OFFICE O/P EST MOD 30 MIN: CPT | Performed by: FAMILY MEDICINE

## 2022-08-09 RX ORDER — LEVOTHYROXINE SODIUM 0.1 MG/1
100 TABLET ORAL DAILY
Qty: 90 TABLET | Refills: 1 | Status: SHIPPED | OUTPATIENT
Start: 2022-08-09

## 2022-08-09 RX ORDER — FAMOTIDINE 40 MG/1
40 TABLET, FILM COATED ORAL 2 TIMES DAILY
Qty: 60 TABLET | Refills: 0 | Status: SHIPPED | OUTPATIENT
Start: 2022-08-09 | End: 2022-09-27 | Stop reason: SDUPTHER

## 2022-08-09 ASSESSMENT — PATIENT HEALTH QUESTIONNAIRE - PHQ9
SUM OF ALL RESPONSES TO PHQ QUESTIONS 1-9: 0
2. FEELING DOWN, DEPRESSED OR HOPELESS: 0
SUM OF ALL RESPONSES TO PHQ QUESTIONS 1-9: 0
1. LITTLE INTEREST OR PLEASURE IN DOING THINGS: 0
SUM OF ALL RESPONSES TO PHQ9 QUESTIONS 1 & 2: 0
SUM OF ALL RESPONSES TO PHQ QUESTIONS 1-9: 0
SUM OF ALL RESPONSES TO PHQ QUESTIONS 1-9: 0

## 2022-08-09 NOTE — PROGRESS NOTES
Patient ID: Pina Ba 1965    . Chief Complaint   Patient presents with    Hypothyroidism    Other     MRDD            HPI        Hypothyroid:   takes his thyroid medication on empty stomach. Down syndrome: now less independent. Patient was with another care giver group in the past.  He is new to this caregiver now. He has been with them for 1 month. The last caregiver thought that patient was having intellectual decline. They feel he is not as active as before and they are worried about the dementia associated with Down syndrome. 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 Caregiver stating that the last caregiver mentioned that he was not affectionate. However she has seen behaviors that suggest otherwise. Caregiver did not get his medical history when he transferred to them      Vomiting: Caregiver mentioned staff has found vomit in the home. They do not witness this. Caregiver cannot give the number of times he has vomited but she thinks is more than 5 times. Prostate cancer screening: no urinary accidents. No reports of blood in the urine. Hx pulmonary embolism: He is taking Xarelto for this. Review of Systems    Patient Active Problem List   Diagnosis    Down's Syndrome    Hypothyroidism    Mutism    Thrombophilia (HCC)    Chronic anticoagulation    History of pulmonary embolism    Left leg DVT (HCC)    Intellectual disability    Overweight (BMI 25.0-29. 9)       No past surgical history on file. No family history on file. No current outpatient medications on file prior to visit. No current facility-administered medications on file prior to visit. Objective:         Physical Exam  Constitutional:       General: He is not in acute distress. Appearance: He is well-developed. Comments: Patient is playfully touching his foot to his caregivers foot   HENT:      Head: Atraumatic.       Right Andrea Hart., et al., 2013) is: 5.7%    Values used to calculate the score:      Age: 59 years      Sex: Male      Is Non- : No      Diabetic: No      Tobacco smoker: No      Systolic Blood Pressure: 914 mmHg      Is BP treated: No      HDL Cholesterol: 65 MG/DL      Total Cholesterol: 196 MG/DL  Lab Review   Hospital Outpatient Visit on 07/08/2022   Component Date Value    Source 07/08/2022 UNKNOWN     SARS-CoV-2 07/08/2022 NOT DETECTED            Assessment:       Diagnosis Orders   1. Down's Syndrome        2. History of pulmonary embolism  rivaroxaban (XARELTO) 20 MG TABS tablet      3. Hypothyroidism  levothyroxine (SYNTHROID) 100 MCG tablet      4. Intellectual disability        5. Overweight (BMI 25.0-29.9)        6. Screen for colon cancer  [facility] - Wu Chacko MD, Gastroenterology, [place]      7. Need for COVID-19 vaccine        8. Need for shingles vaccine        9. Impacted cerumen of both ears        10. Vomiting, intractability of vomiting not specified, presence of nausea not specified, unspecified vomiting type  famotidine (PEPCID) 40 MG tablet      11. Prostate cancer screening        12. Thrombophilia ([facility] [address]                Plan: For the vomiting, will add on Pepcid and recheck him in 1 month. Recommended to get new COVID-vaccine when available. Recommended to get shingles vaccine. May do ear irrigation in the future. Reports of declining intellectual functioning could be subjective. I would like to see how patient adjust to his new environment with his new caregiver. We will reassess in the future. Declining intellect could be part of his Down's diagnosis. Return in about 3 months (around 11/9/2022) for vomiting, MRDD.

## 2022-08-25 ENCOUNTER — OFFICE VISIT (OUTPATIENT)
Dept: FAMILY MEDICINE CLINIC | Age: 57
End: 2022-08-25
Payer: MEDICAID

## 2022-08-25 ENCOUNTER — TELEPHONE (OUTPATIENT)
Dept: FAMILY MEDICINE CLINIC | Age: 57
End: 2022-08-25

## 2022-08-25 VITALS
SYSTOLIC BLOOD PRESSURE: 120 MMHG | OXYGEN SATURATION: 96 % | DIASTOLIC BLOOD PRESSURE: 80 MMHG | TEMPERATURE: 98.2 F | HEART RATE: 74 BPM | WEIGHT: 152.4 LBS | HEIGHT: 60 IN | BODY MASS INDEX: 29.92 KG/M2

## 2022-08-25 DIAGNOSIS — F41.8 SITUATIONAL ANXIETY: ICD-10-CM

## 2022-08-25 DIAGNOSIS — R40.0 SOMNOLENCE: ICD-10-CM

## 2022-08-25 DIAGNOSIS — K14.8 DRY TONGUE: ICD-10-CM

## 2022-08-25 DIAGNOSIS — E07.9 THYROID DISEASE: ICD-10-CM

## 2022-08-25 DIAGNOSIS — R41.0 CONFUSED: Primary | ICD-10-CM

## 2022-08-25 DIAGNOSIS — F72 SEVERE INTELLECTUAL DISABILITY WITH INTELLIGENCE QUOTIENT 20 TO 34: ICD-10-CM

## 2022-08-25 LAB
A/G RATIO: 1.3 (ref 1.1–2.2)
ALBUMIN SERPL-MCNC: 3.9 G/DL (ref 3.4–5)
ALP BLD-CCNC: 88 U/L (ref 40–129)
ALT SERPL-CCNC: 18 U/L (ref 10–40)
ANION GAP SERPL CALCULATED.3IONS-SCNC: 12 MMOL/L (ref 3–16)
AST SERPL-CCNC: 16 U/L (ref 15–37)
BASOPHILS ABSOLUTE: 0 K/UL (ref 0–0.2)
BASOPHILS RELATIVE PERCENT: 1.1 %
BILIRUB SERPL-MCNC: <0.2 MG/DL (ref 0–1)
BUN BLDV-MCNC: 15 MG/DL (ref 7–20)
CALCIUM SERPL-MCNC: 9 MG/DL (ref 8.3–10.6)
CHLORIDE BLD-SCNC: 100 MMOL/L (ref 99–110)
CO2: 25 MMOL/L (ref 21–32)
CREAT SERPL-MCNC: 1.1 MG/DL (ref 0.9–1.3)
EOSINOPHILS ABSOLUTE: 0 K/UL (ref 0–0.6)
EOSINOPHILS RELATIVE PERCENT: 0.9 %
GFR AFRICAN AMERICAN: >60
GFR NON-AFRICAN AMERICAN: >60
GLUCOSE BLD-MCNC: 84 MG/DL (ref 70–99)
HCT VFR BLD CALC: 43.4 % (ref 40.5–52.5)
HEMOGLOBIN: 14.7 G/DL (ref 13.5–17.5)
LYMPHOCYTES ABSOLUTE: 0.9 K/UL (ref 1–5.1)
LYMPHOCYTES RELATIVE PERCENT: 19 %
MCH RBC QN AUTO: 33.9 PG (ref 26–34)
MCHC RBC AUTO-ENTMCNC: 34 G/DL (ref 31–36)
MCV RBC AUTO: 99.9 FL (ref 80–100)
MONOCYTES ABSOLUTE: 0.5 K/UL (ref 0–1.3)
MONOCYTES RELATIVE PERCENT: 11.2 %
NEUTROPHILS ABSOLUTE: 3.1 K/UL (ref 1.7–7.7)
NEUTROPHILS RELATIVE PERCENT: 67.8 %
PDW BLD-RTO: 15 % (ref 12.4–15.4)
PLATELET # BLD: 370 K/UL (ref 135–450)
PMV BLD AUTO: 7.4 FL (ref 5–10.5)
POTASSIUM SERPL-SCNC: 5.2 MMOL/L (ref 3.5–5.1)
RBC # BLD: 4.34 M/UL (ref 4.2–5.9)
SODIUM BLD-SCNC: 137 MMOL/L (ref 136–145)
TOTAL PROTEIN: 6.8 G/DL (ref 6.4–8.2)
TSH REFLEX: 1.59 UIU/ML (ref 0.27–4.2)
VITAMIN B-12: 276 PG/ML (ref 211–911)
VITAMIN D 25-HYDROXY: 22.4 NG/ML
WBC # BLD: 4.6 K/UL (ref 4–11)

## 2022-08-25 PROCEDURE — 99214 OFFICE O/P EST MOD 30 MIN: CPT | Performed by: FAMILY MEDICINE

## 2022-08-25 PROCEDURE — 36415 COLL VENOUS BLD VENIPUNCTURE: CPT | Performed by: FAMILY MEDICINE

## 2022-08-25 NOTE — PROGRESS NOTES
Patient ID: Arslan Walters 1965    Chief Complaint   Patient presents with    Altered Mental Status     Confusion, forgetting where the restroom is , falling asleep more then usual.          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 Now not able to do his job and is being moved/ demoted. Not able to do things he was once able to do. Mental decline ongoing for 2-3 years. No urine incontinence or stool incont. History per caregiver. Caregiver unsure if he is depressed    Sleepy: more drowsy. Falls asleep at work. Falling asleep at doctors visit yesterday and falling asleep now. No complaints of abnormal sleep at night, but not specifically asked of the night care team.  Patient lives in group home. Fell asleep while urinating yesterday (sits while he urinates). Caregiver had to go through a lot of work with crawling under stall to wake him up. Review of Systems    Patient Active Problem List   Diagnosis    Down's Syndrome    Hypothyroidism    Mutism    Thrombophilia (HCC)    Chronic anticoagulation    History of pulmonary embolism    Left leg DVT ([facility])    Intellectual disability    Overweight (BMI 25.0-29. 9)    Vitamin D insufficiency       Past Surgical History:   Procedure Laterality Date    COLONOSCOPY N/A 11/3/2022    COLONOSCOPY DIAGNOSTIC performed by Delon Haro MD at [address] 11/3/2022    EGD DIAGNOSTIC ONLY performed by Delon Haro MD at [address] [facility] ENDOSCOPY       No family history on file. Current Outpatient Medications on File Prior to Visit   Medication Sig Dispense Refill    rivaroxaban (XARELTO) 20 MG TABS tablet Take 1 tablet by mouth in the morning. 90 tablet 1    levothyroxine (SYNTHROID) 100 MCG tablet Take 1 tablet by mouth in the morning. 90 tablet 1     No current facility-administered medications on file prior to visit.                    Objective: Physical Exam  Constitutional:       General: He is not in acute distress. Appearance: He is well-developed. HENT:      Head: Atraumatic. Comments: Down syndrome facial appearance     Right Ear: Hearing, tympanic membrane and external ear normal.      Left Ear: Hearing, tympanic membrane and external ear normal.      Nose: No mucosal edema or rhinorrhea. Mouth/Throat:      Mouth: Mucous membranes are dry. Pharynx: Oropharynx is clear. No pharyngeal swelling or oropharyngeal exudate. Tonsils: No tonsillar abscesses. Comments: No teeth. Tongue with cobblestone texture and very dry  Eyes:      General: Lids are normal.      Comments: Conjunctivae dry   Neck:      Thyroid: No thyromegaly. Trachea: No tracheal deviation. Cardiovascular:      Rate and Rhythm: Normal rate and regular rhythm. Heart sounds: Normal heart sounds, S1 normal and S2 normal. No murmur heard. No gallop. Pulmonary:      Effort: Pulmonary effort is normal. No respiratory distress. Breath sounds: Normal breath sounds. No wheezing or rales. Abdominal:      Palpations: Abdomen is soft. There is no mass. Tenderness: There is no abdominal tenderness. Musculoskeletal:      Right lower leg: No edema. Left lower leg: No edema. Skin:     General: Skin is warm and dry. Psychiatric:         Attention and Perception: Attention normal.         Speech: He is noncommunicative. Behavior: Behavior is slowed. Behavior is cooperative. Cognition and Memory: Cognition is impaired. Memory is impaired. Vitals:    08/25/22 0930   BP: 120/80   Site: Left Upper Arm   Position: Sitting   Cuff Size: Medium Adult   Pulse: 74   Temp: 98.2 °F (36.8 °C)   TempSrc: Temporal   SpO2: 96%   Weight: 152 lb 6.4 oz (69.1 kg)   Height: 5' (1.524 m)     Body mass index is 29.76 kg/m².      Wt Readings from Last 3 Encounters:   11/18/22 154 lb (69.9 kg)   11/09/22 155 lb 3.2 oz (70.4 kg) 11/02/22 148 lb (67.1 kg)     BP Readings from Last 3 Encounters:   11/18/22 126/84   11/09/22 120/80   11/03/22 (!) 160/94          Results for orders placed or performed in visit on 08/25/22   HIV Screen   Result Value Ref Range    HIV Ag/Ab Non-Reactive Non-reactive    HIV-1 Antibody Non-Reactive Non-reactive    HIV ANTIGEN Non-Reactive Non-reactive    HIV-2 Ab Non-Reactive Non-reactive   Comprehensive Metabolic Panel   Result Value Ref Range    Sodium 137 136 - 145 mmol/L    Potassium 5.2 (H) 3.5 - 5.1 mmol/L    Chloride 100 99 - 110 mmol/L    CO2 25 21 - 32 mmol/L    Anion Gap 12 3 - 16    Glucose 84 70 - 99 mg/dL    BUN 15 7 - 20 mg/dL    Creatinine 1.1 0.9 - 1.3 mg/dL    GFR Non-African American >60 >60    GFR African American >60 >60    Calcium 9.0 8.3 - 10.6 mg/dL    Total Protein 6.8 6.4 - 8.2 g/dL    Albumin 3.9 3.4 - 5.0 g/dL    Albumin/Globulin Ratio 1.3 1.1 - 2.2    Total Bilirubin <0.2 0.0 - 1.0 mg/dL    Alkaline Phosphatase 88 40 - 129 U/L    ALT 18 10 - 40 U/L    AST 16 15 - 37 U/L   CBC with Auto Differential   Result Value Ref Range    WBC 4.6 4.0 - 11.0 K/uL    RBC 4.34 4.20 - 5.90 M/uL    Hemoglobin 14.7 13.5 - 17.5 g/dL    Hematocrit 43.4 40.5 - 52.5 %    MCV 99.9 80.0 - 100.0 fL    MCH 33.9 26.0 - 34.0 pg    MCHC 34.0 31.0 - 36.0 g/dL    RDW 15.0 12.4 - 15.4 %    Platelets 364 965 - 075 K/uL    MPV 7.4 5.0 - 10.5 fL    Neutrophils % 67.8 %    Lymphocytes % 19.0 %    Monocytes % 11.2 %    Eosinophils % 0.9 %    Basophils % 1.1 %    Neutrophils Absolute 3.1 1.7 - 7.7 K/uL    Lymphocytes Absolute 0.9 (L) 1.0 - 5.1 K/uL    Monocytes Absolute 0.5 0.0 - 1.3 K/uL    Eosinophils Absolute 0.0 0.0 - 0.6 K/uL    Basophils Absolute 0.0 0.0 - 0.2 K/uL   TSH with Reflex   Result Value Ref Range    TSH 1.59 0.27 - 4.20 uIU/mL   Vitamin B12   Result Value Ref Range    Vitamin B-12 276 211 - 911 pg/mL   Vitamin D 25 Hydroxy   Result Value Ref Range    Vit D, 25-Hydroxy 22.4 (L) >=30 ng/mL Assessment:       Diagnosis Orders   1. Confused  HIV Screen    Comprehensive Metabolic Panel    CBC with Auto Differential    TSH with Reflex    Vitamin B12    Vitamin D 25 Hydroxy    MRI BRAIN W CONTRAST    MRI BRAIN WO CONTRAST      2. Down's Syndrome        3. Hypothyroidism  HIV Screen    Comprehensive Metabolic Panel    CBC with Auto Differential    TSH with Reflex    Vitamin B12    Vitamin D 25 Hydroxy      4. Somnolence  MRI BRAIN W CONTRAST    MRI BRAIN WO CONTRAST      5. Dry tongue        6. Situational anxiety  diazePAM (VALIUM) 5 MG tablet              Plan:      Drink at least 100 oz water per day    See orders    Re-check after MRI    Could be dementia often seen in Down Syndrome patients. Return for Add this  next visit: confusing.

## 2022-08-25 NOTE — TELEPHONE ENCOUNTER
Patient has taking Xarelto at night. The new script for Xarelto states 1 tablet in the morning.  Nicole Re asking if patient can take the Xarelto at night Please advise

## 2022-08-25 NOTE — TELEPHONE ENCOUNTER
Review of chart indicates that the xarelto has been written for the morning since 2015. I'd like to keep it in the morning.

## 2022-08-26 LAB
HIV AG/AB: NORMAL
HIV ANTIGEN: NORMAL
HIV-1 ANTIBODY: NORMAL
HIV-2 AB: NORMAL

## 2022-09-07 ENCOUNTER — TELEPHONE (OUTPATIENT)
Dept: FAMILY MEDICINE CLINIC | Age: 57
End: 2022-09-07

## 2022-09-07 NOTE — TELEPHONE ENCOUNTER
Called to see if patient got MRI ? He is scheduled for a follow up apt to review results on Friday, if no MRI done then pt needs to be rescheduled.

## 2022-09-21 ENCOUNTER — TELEPHONE (OUTPATIENT)
Dept: FAMILY MEDICINE CLINIC | Age: 57
End: 2022-09-21

## 2022-09-21 NOTE — TELEPHONE ENCOUNTER
[facility] called stating that the patient is needing to be sedated before getting his MRI done. Patient suppose to have his MRI done on 9/21/2022 and was not able too. Please send some type of medication to patients pharmacy and also place a new MRI order in [facility] for patient.  Please advise

## 2022-09-23 DIAGNOSIS — R11.10 VOMITING, INTRACTABILITY OF VOMITING NOT SPECIFIED, PRESENCE OF NAUSEA NOT SPECIFIED, UNSPECIFIED VOMITING TYPE: ICD-10-CM

## 2022-09-26 DIAGNOSIS — R11.10 VOMITING, INTRACTABILITY OF VOMITING NOT SPECIFIED, PRESENCE OF NAUSEA NOT SPECIFIED, UNSPECIFIED VOMITING TYPE: ICD-10-CM

## 2022-09-26 RX ORDER — DIAZEPAM 5 MG/1
5 TABLET ORAL ONCE
Qty: 1 TABLET | Refills: 0 | Status: SHIPPED | OUTPATIENT
Start: 2022-09-26 | End: 2022-09-26

## 2022-09-26 RX ORDER — FAMOTIDINE 40 MG/1
40 TABLET, FILM COATED ORAL 2 TIMES DAILY
Qty: 60 TABLET | Refills: 10 | OUTPATIENT
Start: 2022-09-26

## 2022-09-26 NOTE — TELEPHONE ENCOUNTER
Asking for refill for famotidine 40mg for bubble pack. They are getting ready today for October.  Please send new script

## 2022-09-26 NOTE — TELEPHONE ENCOUNTER
Asking for refill for for Pepcid              bubble pack. They are getting ready today for October.  Please send new script

## 2022-09-27 DIAGNOSIS — R11.10 VOMITING, INTRACTABILITY OF VOMITING NOT SPECIFIED, PRESENCE OF NAUSEA NOT SPECIFIED, UNSPECIFIED VOMITING TYPE: ICD-10-CM

## 2022-09-27 RX ORDER — FAMOTIDINE 40 MG/1
40 TABLET, FILM COATED ORAL 2 TIMES DAILY
Qty: 60 TABLET | Refills: 0 | OUTPATIENT
Start: 2022-09-27

## 2022-09-27 RX ORDER — FAMOTIDINE 40 MG/1
40 TABLET, FILM COATED ORAL 2 TIMES DAILY
Qty: 60 TABLET | Refills: 1 | Status: SHIPPED | OUTPATIENT
Start: 2022-09-27

## 2022-10-21 ENCOUNTER — IMMUNIZATION (OUTPATIENT)
Dept: FAMILY MEDICINE CLINIC | Age: 57
End: 2022-10-21
Payer: MEDICAID

## 2022-10-21 PROCEDURE — 90471 IMMUNIZATION ADMIN: CPT | Performed by: FAMILY MEDICINE

## 2022-10-21 PROCEDURE — 90674 CCIIV4 VAC NO PRSV 0.5 ML IM: CPT | Performed by: FAMILY MEDICINE

## 2022-10-21 PROCEDURE — 99211 OFF/OP EST MAY X REQ PHY/QHP: CPT | Performed by: FAMILY MEDICINE

## 2022-10-24 DIAGNOSIS — R11.10 VOMITING: ICD-10-CM

## 2022-10-24 RX ORDER — FAMOTIDINE 40 MG/1
40 TABLET, FILM COATED ORAL 2 TIMES DAILY
Qty: 60 TABLET | Refills: 1 | OUTPATIENT
Start: 2022-10-24

## 2022-11-02 ENCOUNTER — ANESTHESIA EVENT (OUTPATIENT)
Dept: ENDOSCOPY | Age: 57
End: 2022-11-02
Payer: MEDICAID

## 2022-11-02 NOTE — PROGRESS NOTES
Spoke with Clarissa Rousseau, patient advocate for Georgina Cantu and they will arrive at [phone] at [facility] on 11/3/2022 for his procedure at 1145. They are arriving a little early to get [employer] paperwork done. Spoke with Silvana Hurt RN in sameday to confirm arrival time. IV order in epic, placed by Dana GANDARA.

## 2022-11-02 NOTE — PROGRESS NOTES
Spoke with patient advocate Sosarosita aXvier and patient will arrive at [phone] for his procedure on 11/3/2022. 1. Do not eat or drink anything after midnight - unless instructed by your doctor prior to surgery. This includes                   no water, chewing gum or mints. 2. Follow your directions as prescribed by the doctor for your procedure and medications. 3. Check with your Doctor regarding stopping vitamins, supplements, blood thinners and follow their instructions. Stop vitamins, supplements and NSAIDS:    4. Do not smoke, vape or use chewing tobacco morning of surgery. Do not drink any alcoholic beverages 24 hours prior to surgery. This includes NA Beer. No street drugs 7 days prior to surgery. 5. You may brush your teeth and gargle the morning of surgery. DO NOT SWALLOW WATER   6. You MUST make arrangements for a responsible adult to take you home after your surgery and be able to check on you every couple                   hours for the day. You will not be allowed to leave alone or drive yourself home. It is strongly suggested someone stay with you the first 24                   hrs. Your surgery will be cancelled if you do not have a ride home. 7. Please wear simple, loose fitting clothing to the hospital.  Rosi Hood not bring valuables (money, credit cards, checkbooks, etc.) Do not wear any                   makeup (including no eye makeup) or nail polish on your fingers or toes. 8. DO NOT wear any jewelry or piercings on day of surgery. All body piercing jewelry must be removed. 9. If you have dentures, they will be removed before going to the OR; we will provide you a container. If you wear contact lenses or glasses,                  they will be removed; please bring a case for them. 10. If you  have a Living Will and Durable Power of  for Healthcare, please bring in a copy.            11. Please bring picture ID,  insurance card, paperwork from the doctors office    (H & P, Consent, & card for implantable devices). 12. Take a shower the morning of your procedure with Hibiclens or an anti-bacterial soap. Do not apply any make-up, deodorant, lotion, oil or powder. 15.  Enter thru the main entrance on the day of surgery. Patient will  be given synthroid and pepcid the morning of his procedure. Viviane Elizalde verbalized understanding concerning colon prep instructions and had no questions at this time.

## 2022-11-02 NOTE — ANESTHESIA PRE PROCEDURE
Department of Anesthesiology  Preprocedure Note       Name:  Edward Mckinnon   Age:  59 y.o.  :  1965                                          MRN:  [id number]         Date:  2022      Surgeon: Shahnaz Ghosh):  Khushboo Hart MD    Procedure: Procedure(s):  COLONOSCOPY WITH BIOPSY  EGD ESOPHAGOGASTRODUODENOSCOPY    Medications prior to admission:   Prior to Admission medications    Medication Sig Start Date End Date Taking? Authorizing Provider   famotidine (PEPCID) 40 MG tablet Take 1 tablet by mouth 2 times daily 22   Nallely Valenzuela MD   rivaroxaban (XARELTO) 20 MG TABS tablet Take 1 tablet by mouth in the morning. 22   Nallely Valenzuela MD   levothyroxine (SYNTHROID) 100 MCG tablet Take 1 tablet by mouth in the morning. 22   Nallely Valenzuela MD       Current medications:    No current facility-administered medications for this encounter. Current Outpatient Medications   Medication Sig Dispense Refill    famotidine (PEPCID) 40 MG tablet Take 1 tablet by mouth 2 times daily 60 tablet 1    rivaroxaban (XARELTO) 20 MG TABS tablet Take 1 tablet by mouth in the morning. 90 tablet 1    levothyroxine (SYNTHROID) 100 MCG tablet Take 1 tablet by mouth in the morning. 90 tablet 1       Allergies: Allergies   Allergen Reactions    Neosporin [Neomycin-Bacitracin Zn-Polymyx]        Problem List:    Patient Active Problem List   Diagnosis Code    Down's Syndrome F72    Hypothyroidism E07.9    Mutism R47.01    Thrombophilia ([address] D68.59    Chronic anticoagulation Z79.01    History of pulmonary embolism Z86.711    Left leg DVT ([address] I82.402    Intellectual disability F79    Overweight (BMI 25.0-29. 9) E66.3       Past Medical History:        Diagnosis Date    Chronic anticoagulation 2012    Deviated septum     Down's Syndrome     Severe MRDD, Nonverbal    Flexural eczema 2014    elocon    Hypothyroidism     Left leg DVT ([address] 2012    Coumadin    Mutism     Pulmonary embolism ([address] Questionable; before 2005    Thrombophilia ([facility] [address] 12/2012    High Factor VIII; anticardiolipin IgA; MTHFR mutation heterozygous       Past Surgical History:  No past surgical history on file. Social History:    Social History     Tobacco Use    Smoking status: Never    Smokeless tobacco: Never   Substance Use Topics    Alcohol use: No                                Counseling given: Not Answered      Vital Signs (Current): There were no vitals filed for this visit.                                            BP Readings from Last 3 Encounters:   08/25/22 120/80   08/09/22 120/86   05/24/22 124/70       NPO Status:                                                                                 BMI:   Wt Readings from Last 3 Encounters:   08/25/22 152 lb 6.4 oz (69.1 kg)   08/09/22 148 lb 3.2 oz (67.2 kg)   05/24/22 142 lb (64.4 kg)     There is no height or weight on file to calculate BMI.    CBC:   Lab Results   Component Value Date/Time    WBC 4.6 08/25/2022 10:10 AM    RBC 4.34 08/25/2022 10:10 AM    HGB 14.7 08/25/2022 10:10 AM    HCT 43.4 08/25/2022 10:10 AM    MCV 99.9 08/25/2022 10:10 AM    RDW 15.0 08/25/2022 10:10 AM     08/25/2022 10:10 AM       CMP:   Lab Results   Component Value Date/Time     08/25/2022 10:10 AM    K 5.2 08/25/2022 10:10 AM     08/25/2022 10:10 AM    CO2 25 08/25/2022 10:10 AM    BUN 15 08/25/2022 10:10 AM    CREATININE 1.1 08/25/2022 10:10 AM    GFRAA >60 08/25/2022 10:10 AM    GFRAA >60 10/04/2012 04:16 PM    AGRATIO 1.3 08/25/2022 10:10 AM    LABGLOM >60 08/25/2022 10:10 AM    LABGLOM 75 08/22/2019 04:50 PM    GLUCOSE 84 08/25/2022 10:10 AM    GLUCOSE 106 05/31/2011 03:51 PM    PROT 6.8 08/25/2022 10:10 AM    PROT 6.1 12/05/2012 05:03 AM    CALCIUM 9.0 08/25/2022 10:10 AM    BILITOT <0.2 08/25/2022 10:10 AM    ALKPHOS 88 08/25/2022 10:10 AM    AST 16 08/25/2022 10:10 AM    ALT 18 08/25/2022 10:10 AM       POC Tests: No results for input(s): POCGLU, POCNA, POCK, POCCL, POCBUN, POCHEMO, POCHCT in the last 72 hours. Coags:   Lab Results   Component Value Date/Time    PROTIME 23.6 01/06/2015 04:07 PM    INR 3.3 06/15/2015 12:00 AM    INR 2.24 05/20/2014 05:00 PM    APTT 23.7 12/04/2012 08:00 PM       HCG (If Applicable): No results found for: PREGTESTUR, PREGSERUM, HCG, HCGQUANT     ABGs: No results found for: PHART, PO2ART, LSR7XIH, IRN1UYC, BEART, R5YHPFJN     Type & Screen (If Applicable):  No results found for: LABABO, LABRH    Drug/Infectious Status (If Applicable):  Lab Results   Component Value Date/Time    HEPCAB NON REACTIVE 08/17/2017 12:47 PM       COVID-19 Screening (If Applicable):   Lab Results   Component Value Date/Time    COVID19 NOT DETECTED 07/08/2022 02:10 PM           Anesthesia Evaluation    Airway: Mallampati: II  TM distance: >3 FB   Neck ROM: limited  Mouth opening: > = 3 FB   Dental:    (+) edentulous      Pulmonary:                              Cardiovascular:            Rhythm: regular  Rate: normal                    Neuro/Psych:   (+) psychiatric history:             ROS comment: Down's  mutism GI/Hepatic/Renal:   (+) bowel prep,           Endo/Other:    (+) hypothyroidism, blood dyscrasia: anticoagulation therapy:., .                 Abdominal:       Abdomen: soft. Vascular:   + DVT, PE. Other Findings:           Anesthesia Plan      MAC     ASA 3     (Chart review)  Induction: intravenous. Anesthetic plan and risks discussed with legal guardian. Use of blood products discussed with legal guardian whom. Plan discussed with CRNA.                     ADOLFO Mauricio - ERIN   11/2/2022

## 2022-11-03 ENCOUNTER — ANESTHESIA (OUTPATIENT)
Dept: ENDOSCOPY | Age: 57
End: 2022-11-03
Payer: MEDICAID

## 2022-11-03 ENCOUNTER — HOSPITAL ENCOUNTER (OUTPATIENT)
Age: 57
Setting detail: OUTPATIENT SURGERY
Discharge: HOME | End: 2022-11-03
Attending: INTERNAL MEDICINE | Admitting: INTERNAL MEDICINE
Payer: MEDICAID

## 2022-11-03 VITALS
DIASTOLIC BLOOD PRESSURE: 94 MMHG | HEART RATE: 62 BPM | RESPIRATION RATE: 16 BRPM | SYSTOLIC BLOOD PRESSURE: 160 MMHG | BODY MASS INDEX: 29.06 KG/M2 | OXYGEN SATURATION: 97 % | TEMPERATURE: 98.2 F | WEIGHT: 148 LBS | HEIGHT: 60 IN

## 2022-11-03 PROCEDURE — 6360000002 HC RX W HCPCS

## 2022-11-03 PROCEDURE — 2500000003 HC RX 250 WO HCPCS

## 2022-11-03 PROCEDURE — 7100000010 HC PHASE II RECOVERY - FIRST 15 MIN: Performed by: INTERNAL MEDICINE

## 2022-11-03 PROCEDURE — 3700000001 HC ADD 15 MINUTES (ANESTHESIA): Performed by: INTERNAL MEDICINE

## 2022-11-03 PROCEDURE — 2709999900 HC NON-CHARGEABLE SUPPLY: Performed by: INTERNAL MEDICINE

## 2022-11-03 PROCEDURE — 7100000011 HC PHASE II RECOVERY - ADDTL 15 MIN: Performed by: INTERNAL MEDICINE

## 2022-11-03 PROCEDURE — 3609027000 HC COLONOSCOPY: Performed by: INTERNAL MEDICINE

## 2022-11-03 PROCEDURE — 3700000000 HC ANESTHESIA ATTENDED CARE: Performed by: INTERNAL MEDICINE

## 2022-11-03 PROCEDURE — 3609017100 HC EGD: Performed by: INTERNAL MEDICINE

## 2022-11-03 PROCEDURE — 2580000003 HC RX 258: Performed by: SPECIALIST

## 2022-11-03 RX ORDER — PROPOFOL 10 MG/ML
INJECTION, EMULSION INTRAVENOUS PRN
Status: DISCONTINUED | OUTPATIENT
Start: 2022-11-03 | End: 2022-11-03 | Stop reason: SDUPTHER

## 2022-11-03 RX ORDER — SODIUM CHLORIDE, SODIUM LACTATE, POTASSIUM CHLORIDE, CALCIUM CHLORIDE 600; 310; 30; 20 MG/100ML; MG/100ML; MG/100ML; MG/100ML
INJECTION, SOLUTION INTRAVENOUS CONTINUOUS
Status: DISCONTINUED | OUTPATIENT
Start: 2022-11-03 | End: 2022-11-03 | Stop reason: HOSPADM

## 2022-11-03 RX ORDER — LIDOCAINE HYDROCHLORIDE 20 MG/ML
INJECTION, SOLUTION EPIDURAL; INFILTRATION; INTRACAUDAL; PERINEURAL PRN
Status: DISCONTINUED | OUTPATIENT
Start: 2022-11-03 | End: 2022-11-03 | Stop reason: SDUPTHER

## 2022-11-03 RX ADMIN — PROPOFOL 280 MG: 10 INJECTION, EMULSION INTRAVENOUS at 12:02

## 2022-11-03 RX ADMIN — LIDOCAINE HYDROCHLORIDE 100 MG: 20 INJECTION, SOLUTION EPIDURAL; INFILTRATION; INTRACAUDAL; PERINEURAL at 12:02

## 2022-11-03 RX ADMIN — PHENYLEPHRINE HYDROCHLORIDE 50 MCG: 10 INJECTION INTRAVENOUS at 12:09

## 2022-11-03 RX ADMIN — SODIUM CHLORIDE, POTASSIUM CHLORIDE, SODIUM LACTATE AND CALCIUM CHLORIDE: 600; 310; 30; 20 INJECTION, SOLUTION INTRAVENOUS at 11:41

## 2022-11-03 ASSESSMENT — PAIN SCALES - WONG BAKER
WONGBAKER_NUMERICALRESPONSE: 0
WONGBAKER_NUMERICALRESPONSE: 0

## 2022-11-03 ASSESSMENT — PAIN SCALES - GENERAL
PAINLEVEL_OUTOF10: 0
PAINLEVEL_OUTOF10: 0

## 2022-11-03 NOTE — ANESTHESIA POSTPROCEDURE EVALUATION
Department of Anesthesiology  Postprocedure Note    Patient: Ivis He  MRN: [id number]  [place]: 1965  Date of evaluation: 11/3/2022      Procedure Summary     Date: 11/03/22 Room / Location: [address]    Anesthesia Start: 1152 Anesthesia Stop: 1243    Procedures:       COLONOSCOPY DIAGNOSTIC      EGD DIAGNOSTIC ONLY Diagnosis:       Screen for colon cancer      Nausea and vomiting, unspecified vomiting type      (Screen for colon cancer [Z12.11])      (Nausea and vomiting, unspecified vomiting type [R11.2])    Surgeons: Brian Sharma MD Responsible Provider: Oswald Pastor MD    Anesthesia Type: MAC ASA Status: 3          Anesthesia Type: MAC    Tonya Phase I:      Tonya Phase II:        Anesthesia Post Evaluation    Patient location during evaluation: PACU (phase 2)  Patient participation: complete - patient participated  Level of consciousness: sleepy but conscious  Airway patency: patent  Nausea & Vomiting: no nausea and no vomiting  Complications: no  Cardiovascular status: hemodynamically stable  Respiratory status: spontaneous ventilation and room air  Hydration status: stable

## 2022-11-03 NOTE — PROGRESS NOTES
Dr Marcelo García in to see pt, stated observe pt for addidtional 30 min.      1445 pt up in room, lt eye less red at this time, discharge instructions given to [address] pt escorted to main entrance for discharge with Santa Josue, pt caregiver

## 2022-11-03 NOTE — H&P
[facility] gastroenterology Pre-operative History and Physical    Patient: Miguelito Henderson  : 1965  Acct#:       HISTORY OF PRESENT ILLNESS:    The patient is a 59 y.o. male with significant past medical history as below who presents for EGD and colonoscopy    Indication EGD for vomiting possible GERD  Colonoscopy for colon cancer screening    History Obtained From:  Patient and review of all records    Past Medical History:        Diagnosis Date    Chronic anticoagulation 2012    Deviated septum     Down's Syndrome     Severe MRDD, Nonverbal    Flexural eczema 2014    elocon    Hx of blood clots     Hypothyroidism     Left leg DVT ([facility] [address] 2012    Coumadin    Mutism     Pulmonary embolism ([facility] [address]     Questionable; before     Thrombophilia ([facility] [address] 2012    High Factor VIII; anticardiolipin IgA; MTHFR mutation heterozygous       Past Surgical History:    History reviewed. No pertinent surgical history. Current Medications:    Medications    Prior to Admission medications    Medication Sig Start Date End Date Taking? Authorizing Provider   famotidine (PEPCID) 40 MG tablet Take 1 tablet by mouth 2 times daily 22   Sancho Goldstein MD   rivaroxaban (XARELTO) 20 MG TABS tablet Take 1 tablet by mouth in the morning. 22   Sancho Goldstein MD   levothyroxine (SYNTHROID) 100 MCG tablet Take 1 tablet by mouth in the morning. 22   Sancho Goldstein MD      Scheduled Medications:   Infusions:   PRN Medications: Allergies: Allergies   Allergen Reactions    Neosporin [Neomycin-Bacitracin Zn-Polymyx]          Allergies:  Neosporin [neomycin-bacitracin zn-polymyx]    Social History:   TOBACCO:   reports that he has never smoked. He has never used smokeless tobacco.  ETOH:   reports no history of alcohol use. Family History:   History reviewed. No pertinent family history.     REVIEW OF SYSTEMS:    Negative except for HPI        PHYSICAL EXAM:      Vitals:    Ht 5' (1.524 m)   Wt 148 lb (67.1 kg)

## 2022-11-03 NOTE — DISCHARGE INSTRUCTIONS
EGD/COLONOSCOPY    DR. Aubrie Foster    OFFICE NUMBER     FOLLOW UP APPOINTMENT AS NEEDED. REPEAT PROCEDURE IN 10 YEARS OR AS NEEDED. What to Expect at [place] [address] Recovery:EGD  The only discomfort after your EGD is generally limited to a mild soreness of the throat, which may last a day or two. Call your physician immediately if you have severe chest pain, shortness of breath or a temperature of 100 degrees or higher if taken orally. Your Recovery: COLON   Your doctor will tell you when you can eat and do your other usual activitiesYour doctor will talk to you about when you will need your next colonoscopy. Your doctor can help you decide how often you need to be checked. This will depend on the results of your test and your risk for colorectal cancer. After the test, you may be bloated or have gas pains. You may need to pass gas. If a biopsy was done or a polyp was removed, you may have streaks of blood in your stool (feces) for a few days. This care sheet gives you a general idea about how long it will take for you to recover. But each person recovers at a different pace. Follow the steps below to get better as quickly as possible. How can you care for yourself at home? Activity  Rest as much as you need to after you go home. You should be able to go back to your usual activities the day after the test.  Diet  Follow your doctor's directions for eating after the test.  Drink plenty of fluids (unless your doctor has told you not to). Medications  If you have a sore throat the day after the test, use an over-the-counter spray to numb your throat. Your doctor will tell you if and when you can restart your medicines. He or she will also give you instructions about taking any new medicines. If you take blood thinners, such as warfarin (Coumadin), clopidogrel (Plavix), or aspirin, be sure to talk to your doctor. He or she will tell you if and when to start taking those medicines again.  Make sure that you understand exactly what your doctor wants you to do. If a biopsy was done during the test, your doctor may tell you not to take aspirin or other anti-inflammatory medicines for a few days. These include ibuprofen (Advil, Motrin) and naproxen (Aleve). DO NOT DRINK  ALCOHOL TODAY. Other instructions:Anesthesia  For your safety, do not drive or operate machinery for 24 hours. Do not sign legal documents or make major decisions for 24 hours. The anesthesia can make it hard for you to fully understand what you are agreeing to. Follow-up care is a key part of your treatment and safety. Be sure to make and go to all appointments, and call your doctor if you are having problems. It's also a good idea to know your test results and keep a list of the medicines you take. When should you call for help? 1 [address] [phone]  Call 911 anytime you think you may need emergency care. For example, call if:  You passed out (lost consciousness). You cough up blood. You vomit blood or what looks like coffee grounds. You pass maroon or very bloody stools. Call your doctor now or seek immediate medical care if:  You have trouble swallowing. You have belly pain. Your stools are black and tarlike or have streaks of blood. You are sick to your stomach or cannot keep fluids down. Watch closely for changes in your health, and be sure to contact your doctor if:  Your throat still hurts after a day or two. You do not get better as expected. Where can you learn more? Go to https://Betterificpedro.[employer]. org and sign in to your [employer] account. Enter A812 in the MightyMeeting box to learn more about Upper GI Endoscopy: What to Expect at Home.     If you do not have an account, please click on the Sign Up Now link. © 9700-2520 Healthwise, Incorporated.  Care instructions adapted under license by [address]. This care instruction is for use with your licensed healthcare professional. If you have questions about a medical condition or this instruction, always ask your healthcare professional. [address] any warranty or liability for your use of this information. Content Version: 67.5.619117; Current as of: November 20, 2015                Advance Care Planning  People with COVID-19 may have no symptoms, mild symptoms, such as fever, cough, and shortness of breath or they may have more severe illness, developing severe and fatal pneumonia. As a result, Advance Care Planning with attention to naming a health care decision maker (someone you trust to make healthcare decisions for you if you could not speak for yourself) and sharing other health care preferences is important BEFORE a possible health crisis. Please contact your Primary Care Provider to discuss Advance Care Planning. Preventing the Spread of Coronavirus Disease 2019 in Homes and Residential Communities  For the most recent information go to [email or web address]    Prevention steps for People with confirmed or suspected COVID-19 (including persons under investigation) who do not need to be hospitalized  and   People with confirmed COVID-19 who were hospitalized and determined to be medically stable to go home    Your healthcare provider and public health staff will evaluate whether you can be cared for at home. If it is determined that you do not need to be hospitalized and can be isolated at home, you will be monitored by staff from your local or state health department. You should follow the prevention steps below until a healthcare provider or local or state health department says you can return to your normal activities. Stay home except to get medical care  People who are mildly ill with COVID-19 are able to isolate at home during their illness.  You should restrict exposed. Ask your healthcare provider to call the local or state health department. Persons who are placed under active monitoring or facilitated self-monitoring should follow instructions provided by their local health department or occupational health professionals, as appropriate. When working with your local health department check their available hours. If you have a medical emergency and need to call 911, notify the dispatch personnel that you have, or are being evaluated for COVID-19. If possible, put on a facemask before emergency medical services arrive. Discontinuing home isolation  Patients with confirmed COVID-19 should remain under home isolation precautions until the risk of secondary transmission to others is thought to be low. The decision to discontinue home isolation precautions should be made on a case-by-case basis, in consultation with healthcare providers and state and local health departments. Yes

## 2022-11-03 NOTE — PROGRESS NOTES
1241 - Patient returned to room from [facility], arousable upon calling, VSS (see doc flow), assessment completed as per doc flow. Patient does not appear to be in pain, but L eye is red, and visibly bothering him. Beverage offered. Call light in reach, bed in low position. RN to continue to monitor. Eden Shah caretaker at the bedside.      [phone] - Dr Minerva Delatorre  called to come to bedside to assess the patient's eye.     1926 - Report given to [facility]

## 2022-11-09 ENCOUNTER — OFFICE VISIT (OUTPATIENT)
Dept: FAMILY MEDICINE CLINIC | Age: 57
End: 2022-11-09
Payer: MEDICAID

## 2022-11-09 VITALS
BODY MASS INDEX: 30.47 KG/M2 | OXYGEN SATURATION: 96 % | SYSTOLIC BLOOD PRESSURE: 120 MMHG | HEART RATE: 100 BPM | HEIGHT: 60 IN | DIASTOLIC BLOOD PRESSURE: 80 MMHG | WEIGHT: 155.2 LBS

## 2022-11-09 DIAGNOSIS — Z23 NEED FOR COVID-19 VACCINE: ICD-10-CM

## 2022-11-09 DIAGNOSIS — Z23 NEED FOR SHINGLES VACCINE: ICD-10-CM

## 2022-11-09 DIAGNOSIS — F72 SEVERE INTELLECTUAL DISABILITY WITH INTELLIGENCE QUOTIENT 20 TO 34: Primary | ICD-10-CM

## 2022-11-09 DIAGNOSIS — E55.9 VITAMIN D INSUFFICIENCY: ICD-10-CM

## 2022-11-09 DIAGNOSIS — R68.2 DRY MOUTH: ICD-10-CM

## 2022-11-09 DIAGNOSIS — G47.00 INSOMNIA, UNSPECIFIED TYPE: ICD-10-CM

## 2022-11-09 DIAGNOSIS — K21.9 GASTROESOPHAGEAL REFLUX DISEASE, UNSPECIFIED WHETHER ESOPHAGITIS PRESENT: ICD-10-CM

## 2022-11-09 PROCEDURE — 99214 OFFICE O/P EST MOD 30 MIN: CPT | Performed by: FAMILY MEDICINE

## 2022-11-09 RX ORDER — FAMOTIDINE 40 MG/1
40 TABLET, FILM COATED ORAL 2 TIMES DAILY
Qty: 60 TABLET | Refills: 11 | Status: SHIPPED | OUTPATIENT
Start: 2022-11-09

## 2022-11-09 RX ORDER — CHOLECALCIFEROL (VITAMIN D3) 125 MCG
125 CAPSULE ORAL DAILY
Qty: 30 CAPSULE | Refills: 11 | Status: SHIPPED | OUTPATIENT
Start: 2022-11-09

## 2022-11-09 RX ORDER — LANOLIN ALCOHOL/MO/W.PET/CERES
3 CREAM (GRAM) TOPICAL
Qty: 30 TABLET | Refills: 5 | Status: SHIPPED | OUTPATIENT
Start: 2022-11-09

## 2022-11-09 NOTE — PROGRESS NOTES
Patient ID: Connie Aw 1965    . Chief Complaint   Patient presents with    Emesis    Other     MRDD           Gastroesophageal Reflux  This is a new problem. The problem has been resolved. Vomiting has stopped with pepcid. He has tried a histamine-2 antagonist for the symptoms. The treatment provided significant relief. Insomnia  This is a recurrent problem. Associated symptoms comments: wakes up a lot and gets out of bed per staff. Some staff say that his sleeping is not a problem  . Nothing aggravates the symptoms. He has tried nothing for the symptoms. Tongue problem: caregiver worried that tongue is dry and fissured. He has access to water whenever he wants      Patient Active Problem List   Diagnosis    Down's Syndrome    Hypothyroidism    Mutism    Thrombophilia ([facility]    Chronic anticoagulation    History of pulmonary embolism    Left leg DVT ([facility]    Intellectual disability    Overweight (BMI 25.0-29. 9)    Vitamin D insufficiency       Past Surgical History:   Procedure Laterality Date    COLONOSCOPY N/A 11/3/2022    COLONOSCOPY DIAGNOSTIC performed by Trena Parry MD at [address] [place] [address] 11/3/2022    EGD DIAGNOSTIC ONLY performed by Trena Parry MD at [facility] ENDOSCOPY       No family history on file. Current Outpatient Medications on File Prior to Visit   Medication Sig Dispense Refill    rivaroxaban (XARELTO) 20 MG TABS tablet Take 1 tablet by mouth in the morning. 90 tablet 1    levothyroxine (SYNTHROID) 100 MCG tablet Take 1 tablet by mouth in the morning. 90 tablet 1     No current facility-administered medications on file prior to visit. Objective:         Physical Exam  Vitals and nursing note reviewed. Constitutional:       Appearance: He is well-developed. HENT:      Head: Normocephalic and atraumatic. Mouth/Throat:      Mouth: Mucous membranes are dry.       Comments: Geographic tongue    No teeth  Cardiovascular: Rate and Rhythm: Normal rate and regular rhythm. Heart sounds: Normal heart sounds, S1 normal and S2 normal.   Pulmonary:      Effort: No respiratory distress. Breath sounds: Normal breath sounds. No wheezing or rales. Abdominal:      Palpations: Abdomen is soft. Tenderness: There is no abdominal tenderness. Comments: Abdomen exam limited as patient would cover his abdomen on my examination   Skin:     General: Skin is warm and dry. Neurological:      Mental Status: He is alert. Mental status is at baseline. Psychiatric:         Behavior: Behavior is slowed. Cognition and Memory: Cognition is impaired. Memory is impaired. Comments: nonverbal     Vitals:    11/09/22 0924   BP: 120/80   Site: Right Upper Arm   Position: Sitting   Cuff Size: Medium Adult   Pulse: 100   SpO2: 96%   Weight: 155 lb 3.2 oz (70.4 kg)   Height: 5' (1.524 m)     Body mass index is 30.31 kg/m². Wt Readings from Last 3 Encounters:   11/09/22 155 lb 3.2 oz (70.4 kg)   11/02/22 148 lb (67.1 kg)   08/25/22 152 lb 6.4 oz (69.1 kg)     BP Readings from Last 3 Encounters:   11/09/22 120/80   11/03/22 (!) 160/94   08/25/22 120/80          No results found for this visit on 11/09/22. The 10-year ASCVD risk score (Mikel SMITH, et al., 2019) is: 5.7%    Values used to calculate the score:      Age: 59 years      Sex: Male      Is Non- : No      Diabetic: No      Tobacco smoker: No      Systolic Blood Pressure: 473 mmHg      Is BP treated: No      HDL Cholesterol: 65 MG/DL      Total Cholesterol: 196 MG/DL  Lab Review   No visits with results within 2 Month(s) from this visit.    Latest known visit with results is:   Office Visit on 08/25/2022   Component Date Value    HIV Ag/Ab 08/25/2022 Non-Reactive     HIV-1 Antibody 08/25/2022 Non-Reactive     HIV ANTIGEN 08/25/2022 Non-Reactive     HIV-2 Ab 08/25/2022 Non-Reactive     Sodium 08/25/2022 137     Potassium 08/25/2022 5.2 (A)     Chloride 08/25/2022 100     CO2 08/25/2022 25     Anion Gap 08/25/2022 12     Glucose 08/25/2022 84     BUN 08/25/2022 15     Creatinine 08/25/2022 1.1     GFR Non- 08/25/2022 >60     GFR  08/25/2022 >60     Calcium 08/25/2022 9.0     Total Protein 08/25/2022 6.8     Albumin 08/25/2022 3.9     Albumin/Globulin Ratio 08/25/2022 1.3     Total Bilirubin 08/25/2022 <0.2     Alkaline Phosphatase 08/25/2022 88     ALT 08/25/2022 18     AST 08/25/2022 16     WBC 08/25/2022 4.6     RBC 08/25/2022 4.34     Hemoglobin 08/25/2022 14.7     Hematocrit 08/25/2022 43.4     MCV 08/25/2022 99.9     MCH 08/25/2022 33.9     MCHC 08/25/2022 34.0     RDW 08/25/2022 15.0     Platelets 20/65/4799 370     MPV 08/25/2022 7.4     Neutrophils % 08/25/2022 67.8     Lymphocytes % 08/25/2022 19.0     Monocytes % 08/25/2022 11.2     Eosinophils % 08/25/2022 0.9     Basophils % 08/25/2022 1.1     Neutrophils Absolute 08/25/2022 3.1     Lymphocytes Absolute 08/25/2022 0.9 (A)     Monocytes Absolute 08/25/2022 0.5     Eosinophils Absolute 08/25/2022 0.0     Basophils Absolute 08/25/2022 0.0     TSH 08/25/2022 1.59     Vitamin B-12 08/25/2022 276     Vit D, 25-Hydroxy 08/25/2022 22.4 (A)            Assessment:       Diagnosis Orders   1. Down's Syndrome        2. Vitamin D insufficiency  Cholecalciferol (VITAMIN D) 125 MCG (5000 UT) CAPS      3. Need for shingles vaccine        4. Need for COVID-19 vaccine        5. Insomnia, unspecified type  melatonin (RA MELATONIN) 3 MG TABS tablet      6. Gastroesophageal reflux disease, unspecified whether esophagitis present  famotidine (PEPCID) 40 MG tablet      7. Dry mouth                Plan:      Reminder to caregivers to get him the shingles vaccine and COVID-vaccine    Review of labs indicates patient has vitamin D insufficiency. Start vitamin D    Start melatonin with dinnertime for his sleep disturbance. Continue the Pepcid to prevent vomiting.   Likely had acid reflux symptoms. Recheck in 3 months to see if he is sleeping any better. Allow patient to have access to water    Continue the pepcid for the probable GERD        Return in about 3 months (around 1/31/2023) for Insomnia.

## 2022-11-18 ENCOUNTER — OFFICE VISIT (OUTPATIENT)
Dept: FAMILY MEDICINE CLINIC | Age: 57
End: 2022-11-18
Payer: MEDICAID

## 2022-11-18 VITALS
SYSTOLIC BLOOD PRESSURE: 126 MMHG | HEART RATE: 74 BPM | WEIGHT: 154 LBS | RESPIRATION RATE: 16 BRPM | TEMPERATURE: 97.6 F | DIASTOLIC BLOOD PRESSURE: 84 MMHG | BODY MASS INDEX: 30.08 KG/M2

## 2022-11-18 DIAGNOSIS — L28.2 PRURITIC RASH: Primary | ICD-10-CM

## 2022-11-18 PROCEDURE — 99213 OFFICE O/P EST LOW 20 MIN: CPT | Performed by: FAMILY MEDICINE

## 2022-11-18 RX ORDER — PREDNISONE 10 MG/1
TABLET ORAL
Qty: 35 TABLET | Refills: 0 | Status: SHIPPED | OUTPATIENT
Start: 2022-11-18 | End: 2022-12-03

## 2022-11-18 RX ORDER — CETIRIZINE HYDROCHLORIDE 10 MG/1
10 TABLET ORAL DAILY
Qty: 30 TABLET | Refills: 0 | Status: SHIPPED | OUTPATIENT
Start: 2022-11-18 | End: 2022-12-18

## 2022-11-18 NOTE — PROGRESS NOTES
Patient ID: Alicia Lala 1965    Chief Complaint   Patient presents with    Other     Noticed forehead, neck, face and back neck hives and itching noticed yesterday          HPI    Rash: Noticed by staff on his forehead, neck, face, back. 1 staff member noticed this several weeks ago. However at his day hab yesterday, it was noticed that his skin was red. Staff member denies any new soaps or lotions or detergents in his home. No one else has any rashes. He is not scratching his skin continuously. Patient Active Problem List   Diagnosis    Down's Syndrome    Hypothyroidism    Mutism    Thrombophilia (HCC)    Chronic anticoagulation    History of pulmonary embolism    Left leg DVT ([address]    Intellectual disability    Overweight (BMI 25.0-29. 9)    Vitamin D insufficiency       Past Surgical History:   Procedure Laterality Date    COLONOSCOPY N/A 11/3/2022    COLONOSCOPY DIAGNOSTIC performed by Tabitha Olguin MD at [address] [place] [address] 11/3/2022    EGD DIAGNOSTIC ONLY performed by Tabitha Olguin MD at [address] [facility] ENDOSCOPY       No family history on file. Current Outpatient Medications on File Prior to Visit   Medication Sig Dispense Refill    melatonin (RA MELATONIN) 3 MG TABS tablet Take 1 tablet by mouth Daily with supper 30 tablet 5    Cholecalciferol (VITAMIN D) 125 MCG (5000 UT) CAPS Take 125 mcg by mouth daily 30 capsule 11    famotidine (PEPCID) 40 MG tablet Take 1 tablet by mouth 2 times daily 60 tablet 11    rivaroxaban (XARELTO) 20 MG TABS tablet Take 1 tablet by mouth in the morning. 90 tablet 1    levothyroxine (SYNTHROID) 100 MCG tablet Take 1 tablet by mouth in the morning. 90 tablet 1     No current facility-administered medications on file prior to visit. Objective:           Physical Exam  Skin:         Neurological:      Mental Status: He is alert. Mental status is at baseline. Psychiatric:         Speech: He is noncommunicative.  Speech is delayed. Behavior: Behavior is slowed. Cognition and Memory: Cognition is impaired. Memory is impaired. Vitals:    11/18/22 1113   BP: 126/84   Site: Left Upper Arm   Position: Sitting   Cuff Size: Medium Adult   Pulse: 74   Resp: 16   Temp: 97.6 °F (36.4 °C)   TempSrc: Temporal   Weight: 154 lb (69.9 kg)     Body mass index is 30.08 kg/m². Wt Readings from Last 3 Encounters:   11/18/22 154 lb (69.9 kg)   11/09/22 155 lb 3.2 oz (70.4 kg)   11/02/22 148 lb (67.1 kg)     BP Readings from Last 3 Encounters:   11/18/22 126/84   11/09/22 120/80   11/03/22 (!) 160/94          No results found for this visit on 11/18/22. Assessment:       Diagnosis Orders   1. Pruritic rash  cetirizine (ZYRTEC) 10 MG tablet    predniSONE (DELTASONE) 10 MG tablet              Plan:      See orders    Return if symptoms worsen or fail to improve.

## 2022-12-01 ENCOUNTER — TELEPHONE (OUTPATIENT)
Dept: FAMILY MEDICINE CLINIC | Age: 57
End: 2022-12-01

## 2022-12-01 NOTE — TELEPHONE ENCOUNTER
Radiology department called asking if you could change the order for the patients MRI. It needs to be with out contrast. The only time they use the contrast if its for hearing loss a brain bleed. Patient has the appointment tomorrow please change order on epic.  Please advise

## 2022-12-02 ENCOUNTER — HOSPITAL ENCOUNTER (OUTPATIENT)
Dept: MRI IMAGING | Age: 57
Discharge: HOME | End: 2022-12-02
Payer: MEDICAID

## 2022-12-02 DIAGNOSIS — R41.0 CONFUSED: ICD-10-CM

## 2022-12-02 DIAGNOSIS — R40.0 SOMNOLENCE: ICD-10-CM

## 2022-12-02 PROCEDURE — 70551 MRI BRAIN STEM W/O DYE: CPT

## 2022-12-06 DIAGNOSIS — L28.2 PRURITIC RASH: ICD-10-CM

## 2022-12-08 RX ORDER — CETIRIZINE HYDROCHLORIDE 10 MG/1
10 TABLET ORAL DAILY
Qty: 31 TABLET | Refills: 0 | OUTPATIENT
Start: 2022-12-08 | End: 2023-01-07

## 2022-12-08 NOTE — PATIENT INSTRUCTIONS
We are committed to providing you the best care possible. If you receive a survey after visiting one of our offices, please take time to share your experience concerning your physician office visit. These surveys are confidential and no health information about you is shared. We are eager to improve for you and we are counting on your feedback to help make that happen.
Yes

## 2022-12-12 ENCOUNTER — TELEMEDICINE (OUTPATIENT)
Dept: FAMILY MEDICINE CLINIC | Age: 57
End: 2022-12-12
Payer: MEDICAID

## 2022-12-12 DIAGNOSIS — Z86.73 HISTORY OF CVA IN ADULTHOOD: Primary | ICD-10-CM

## 2022-12-12 DIAGNOSIS — F79 INTELLECTUAL DISABILITY: ICD-10-CM

## 2022-12-12 DIAGNOSIS — L28.2 PRURITIC RASH: ICD-10-CM

## 2022-12-12 DIAGNOSIS — E23.6 EMPTY SELLA (HCC): ICD-10-CM

## 2022-12-12 PROCEDURE — 99213 OFFICE O/P EST LOW 20 MIN: CPT | Performed by: FAMILY MEDICINE

## 2022-12-12 RX ORDER — ATORVASTATIN CALCIUM 40 MG/1
40 TABLET, FILM COATED ORAL DAILY
Qty: 30 TABLET | Refills: 11 | Status: SHIPPED | OUTPATIENT
Start: 2022-12-12 | End: 2022-12-13 | Stop reason: SDUPTHER

## 2022-12-12 RX ORDER — CETIRIZINE HYDROCHLORIDE 10 MG/1
10 TABLET ORAL DAILY
Qty: 31 TABLET | Refills: 10 | OUTPATIENT
Start: 2022-12-12 | End: 2023-01-11

## 2022-12-12 RX ORDER — ATORVASTATIN CALCIUM 40 MG/1
40 TABLET, FILM COATED ORAL DAILY
Qty: 90 TABLET | Refills: 3 | Status: SHIPPED | OUTPATIENT
Start: 2022-12-12 | End: 2022-12-12 | Stop reason: SDUPTHER

## 2022-12-12 NOTE — PROGRESS NOTES
2022    TELEHEALTH EVALUATION -- Audio/Visual (During [id number] public health emergency)    HPI:    Hattie Graham (:  1965) has requested an audio/video evaluation for the following concern(s):    Here for f/u on MRI brain and mental decline: caregiver says he is stable at work and has had no more decline in functions    Review of Systems    Prior to Visit Medications    Medication Sig Taking? Authorizing Provider   atorvastatin (LIPITOR) 40 MG tablet Take 1 tablet by mouth daily Yes Laree Cabot, MD   cetirizine (ZYRTEC) 10 MG tablet Take 1 tablet by mouth daily Yes Laree Cabot, MD   melatonin (RA MELATONIN) 3 MG TABS tablet Take 1 tablet by mouth Daily with supper Yes Laree Cabot, MD   Cholecalciferol (VITAMIN D) 125 MCG (5000 UT) CAPS Take 125 mcg by mouth daily Yes Laree Cabot, MD   famotidine (PEPCID) 40 MG tablet Take 1 tablet by mouth 2 times daily Yes Laree Cabot, MD   rivaroxaban (XARELTO) 20 MG TABS tablet Take 1 tablet by mouth in the morning. Yes Laree Cabot, MD   levothyroxine (SYNTHROID) 100 MCG tablet Take 1 tablet by mouth in the morning.  Yes Laree Cabot, MD       Social History     Tobacco Use    Smoking status: Never    Smokeless tobacco: Never   Vaping Use    Vaping Use: Never used   Substance Use Topics    Alcohol use: No    Drug use: No        Allergies   Allergen Reactions    Neosporin [Neomycin-Bacitracin Zn-Polymyx]    ,   Past Medical History:   Diagnosis Date    Chronic anticoagulation 2012    Deviated septum     Down's Syndrome     Severe MRDD, Nonverbal    Flexural eczema 2014    elocon    Hx of blood clots     Hypothyroidism     Left leg DVT ([facility] 2012    Coumadin    Mutism     Pulmonary embolism ([facility]     Questionable; before 2005    Thrombophilia ([facility] 2012    High Factor VIII; anticardiolipin IgA; MTHFR mutation heterozygous       PHYSICAL EXAMINATION:  [ INSTRUCTIONS:  \"[x]\" Indicates a positive item  \"[]\" Indicates a negative item  -- DELETE ALL ITEMS NOT EXAMINED]  Vital Signs: (As obtained by patient/caregiver or practitioner observation)    Blood pressure-  Heart rate-    Respiratory rate-    Temperature-  Pulse oximetry-     Constitutional: [x] Appears well-developed and well-nourished [x] No apparent distress      [] Abnormal- patient is dressed and wanting to leave to go to work  Mental status  [] Alert and awake  [] Oriented to person/place/time []Able to follow commands    Baseline. Eyes:  EOM    []  Normal  [] Abnormal-  Sclera  []  Normal  [] Abnormal -         Discharge []  None visible  [] Abnormal -    HENT:   [] Normocephalic, atraumatic. [] Abnormal   [] Mouth/Throat: Mucous membranes are moist.     External Ears [] Normal  [] Abnormal-     Neck: [] No visualized mass     Pulmonary/Chest: [x] Respiratory effort normal.  [] No visualized signs of difficulty breathing or respiratory distress        [] Abnormal-      Musculoskeletal:   [] Normal gait with no signs of ataxia         [] Normal range of motion of neck        [] Abnormal-       Neurological:        [] No Facial Asymmetry (Cranial nerve 7 motor function) (limited exam to video visit)          [] No gaze palsy        [] Abnormal-         Skin:        [x] No significant exanthematous lesions or discoloration noted on facial skin         [] Abnormal-            Psychiatric:       [] Normal Affect [] No Hallucinations        [x] Abnormal- baseline    Other pertinent observable physical exam findings-        1. Markedly limited study secondary to motion artifact. 2. No evidence of acute infarct. 3. Cerebral parenchymal volume loss with mild chronic microvascular white   matter ischemic disease and chronic infarcts in the right cerebellar   hemisphere. 4. Partially empty sella. Diagnosis Orders   1. History of CVA in adulthood  atorvastatin (LIPITOR) 40 MG tablet    DISCONTINUED: atorvastatin (LIPITOR) 40 MG tablet      2. Empty sella ([address]        3.  Intellectual disability          Add on Lipitor. Already on Xarelto            Return if symptoms worsen or fail to improve. Abram Ridley, was evaluated through a synchronous (real-time) audio-video encounter. The patient (or guardian if applicable) is aware that this is a billable service, which includes applicable co-pays. This Virtual Visit was conducted with patient's (and/or legal guardian's) consent. The visit was conducted pursuant to the emergency declaration under the [address] [place] [address] authority and the [employer] and [facility] General Act. Patient identification was verified, and a caregiver was present when appropriate. The patient was located at Home: [address] [place] [address]. Provider was located at Home ([address]: [place]. Total time spent on this encounter: Not billed by time    --Marshall Gonzales MD on 12/12/2022 at 9:57 AM    An electronic signature was used to authenticate this note.

## 2022-12-13 ENCOUNTER — TELEPHONE (OUTPATIENT)
Dept: FAMILY MEDICINE CLINIC | Age: 57
End: 2022-12-13

## 2022-12-13 DIAGNOSIS — Z86.73 HISTORY OF CVA IN ADULTHOOD: ICD-10-CM

## 2022-12-13 RX ORDER — ATORVASTATIN CALCIUM 40 MG/1
40 TABLET, FILM COATED ORAL DAILY
Qty: 30 TABLET | Refills: 2 | Status: SHIPPED | OUTPATIENT
Start: 2022-12-13

## 2022-12-13 NOTE — TELEPHONE ENCOUNTER
Pharmacy called stating she needs a refill for the patients doses package they are missing a referral of zyrtec 10 mg.  Please send script to [facility] pharmacy

## 2022-12-14 ENCOUNTER — TELEPHONE (OUTPATIENT)
Dept: FAMILY MEDICINE CLINIC | Age: 57
End: 2022-12-14

## 2022-12-14 DIAGNOSIS — L28.2 PRURITIC RASH: ICD-10-CM

## 2022-12-14 RX ORDER — CETIRIZINE HYDROCHLORIDE 10 MG/1
10 TABLET ORAL DAILY
Qty: 30 TABLET | Refills: 1 | Status: SHIPPED | OUTPATIENT
Start: 2022-12-14 | End: 2023-01-13

## 2022-12-14 NOTE — TELEPHONE ENCOUNTER
Pharmacy called stating they have not gotten the script for the patients Zyrtec 10 mg.  Please send to [address]

## 2023-01-31 ENCOUNTER — OFFICE VISIT (OUTPATIENT)
Dept: FAMILY MEDICINE CLINIC | Age: 58
End: 2023-01-31
Payer: MEDICAID

## 2023-01-31 VITALS
WEIGHT: 158.6 LBS | BODY MASS INDEX: 30.97 KG/M2 | SYSTOLIC BLOOD PRESSURE: 122 MMHG | OXYGEN SATURATION: 95 % | DIASTOLIC BLOOD PRESSURE: 84 MMHG | RESPIRATION RATE: 16 BRPM | HEART RATE: 78 BPM

## 2023-01-31 DIAGNOSIS — E07.9 THYROID DISEASE: ICD-10-CM

## 2023-01-31 DIAGNOSIS — Z23 NEED FOR SHINGLES VACCINE: ICD-10-CM

## 2023-01-31 DIAGNOSIS — G47.00 INSOMNIA, UNSPECIFIED TYPE: Primary | ICD-10-CM

## 2023-01-31 DIAGNOSIS — Z79.899 POLYPHARMACY: ICD-10-CM

## 2023-01-31 DIAGNOSIS — Z86.711 HISTORY OF PULMONARY EMBOLISM: ICD-10-CM

## 2023-01-31 DIAGNOSIS — Z86.73 HISTORY OF CVA IN ADULTHOOD: ICD-10-CM

## 2023-01-31 DIAGNOSIS — F79 INTELLECTUAL DISABILITY: ICD-10-CM

## 2023-01-31 DIAGNOSIS — R63.5 WEIGHT GAIN: ICD-10-CM

## 2023-01-31 DIAGNOSIS — Z86.718 HISTORY OF DVT OF LOWER EXTREMITY: ICD-10-CM

## 2023-01-31 DIAGNOSIS — H61.21 IMPACTED CERUMEN, RIGHT EAR: ICD-10-CM

## 2023-01-31 PROCEDURE — 99214 OFFICE O/P EST MOD 30 MIN: CPT | Performed by: FAMILY MEDICINE

## 2023-01-31 RX ORDER — CETIRIZINE HYDROCHLORIDE 10 MG/1
10 TABLET ORAL DAILY
COMMUNITY

## 2023-01-31 RX ORDER — LANOLIN ALCOHOL/MO/W.PET/CERES
3 CREAM (GRAM) TOPICAL
Qty: 30 TABLET | Refills: 11 | Status: SHIPPED | OUTPATIENT
Start: 2023-01-31

## 2023-01-31 RX ORDER — ATORVASTATIN CALCIUM 40 MG/1
40 TABLET, FILM COATED ORAL DAILY
Qty: 30 TABLET | Refills: 11 | Status: SHIPPED | OUTPATIENT
Start: 2023-01-31

## 2023-01-31 RX ORDER — LEVOTHYROXINE SODIUM 0.1 MG/1
100 TABLET ORAL DAILY
Qty: 30 TABLET | Refills: 11 | Status: SHIPPED | OUTPATIENT
Start: 2023-01-31

## 2023-01-31 RX ORDER — ZOSTER VACCINE RECOMBINANT, ADJUVANTED 50 MCG/0.5
0.5 KIT INTRAMUSCULAR ONCE
Qty: 1 EACH | Refills: 0 | Status: SHIPPED | OUTPATIENT
Start: 2023-01-31 | End: 2023-01-31

## 2023-01-31 ASSESSMENT — PATIENT HEALTH QUESTIONNAIRE - PHQ9: DEPRESSION UNABLE TO ASSESS: PT REFUSES

## 2023-01-31 NOTE — PROGRESS NOTES
Patient ID: Ryan Meng 1965    . Chief Complaint   Patient presents with    Insomnia     No other concerns          Gastroesophageal Reflux  This is a new problem. The current episode started more than 1 month ago. The problem has been unchanged. He has tried a histamine-2 antagonist for the symptoms. The treatment provided significant relief. Insomnia  This is a recurrent problem. Associated symptoms comments: wakes up a lot and gets out of bed per staff. Some staff say that his sleeping is not a problem  . Nothing aggravates the symptoms. Treatments tried: melatonin. The treatment provided moderate relief. Other  Chronicity: Intellectual disabiligy due to Down's Syndrome. Thyroid Problem  Presents for follow-up visit. (On synthroid and takes as directed)     Polypharrnacy: on Zyrtec. Staff does not know why. Difficulty hearing: caregiver thinks he is having difficulty hearing. He does not seem to listen to her    Patient Active Problem List   Diagnosis    Down's Syndrome    Hypothyroidism    Mutism    Thrombophilia ([address]    Chronic anticoagulation    History of pulmonary embolism    Intellectual disability    Overweight (BMI 25.0-29. 9)    Vitamin D insufficiency    Empty sella (HCC)    History of CVA in adulthood    History of DVT of lower extremity       Past Surgical History:   Procedure Laterality Date    COLONOSCOPY N/A 11/3/2022    COLONOSCOPY DIAGNOSTIC performed by Caroline Clayton MD at [address] [place] [address] 11/3/2022    EGD DIAGNOSTIC ONLY performed by Caroline Clayton MD at [facility] ENDOSCOPY       No family history on file.     Current Outpatient Medications on File Prior to Visit   Medication Sig Dispense Refill    cetirizine (ZYRTEC) 10 MG tablet Take 10 mg by mouth daily      Cholecalciferol (VITAMIN D) 125 MCG (5000 UT) CAPS Take 125 mcg by mouth daily 30 capsule 11    famotidine (PEPCID) 40 MG tablet Take 1 tablet by mouth 2 times daily 60 tablet 11     No current facility-administered medications on file prior to visit. Objective:         Physical Exam  Vitals and nursing note reviewed. Constitutional:       Appearance: He is well-developed. Comments: I cannot tell if patient can hear me but he is smiling and wanting to hold my hand   HENT:      Head: Normocephalic and atraumatic. Right Ear: There is impacted cerumen. Left Ear: Tympanic membrane normal. There is no impacted cerumen. Cardiovascular:      Rate and Rhythm: Normal rate and regular rhythm. Heart sounds: Normal heart sounds, S1 normal and S2 normal.   Pulmonary:      Effort: No respiratory distress. Breath sounds: Normal breath sounds. No wheezing or rales. Skin:     General: Skin is warm and dry. Neurological:      Mental Status: He is alert. Mental status is at baseline. Psychiatric:         Mood and Affect: Mood normal.         Speech: He is noncommunicative. Speech is not delayed. Behavior: Behavior is slowed. Behavior is cooperative. Cognition and Memory: Cognition is impaired. Memory is impaired. Comments: Non verbal     Vitals:    01/31/23 0951   BP: 122/84   Site: Left Upper Arm   Position: Sitting   Cuff Size: Medium Adult   Pulse: 78   Resp: 16   SpO2: 95%   Weight: 158 lb 9.6 oz (71.9 kg)     Body mass index is 30.97 kg/m². Wt Readings from Last 3 Encounters:   01/31/23 158 lb 9.6 oz (71.9 kg)   11/18/22 154 lb (69.9 kg)   11/09/22 155 lb 3.2 oz (70.4 kg)     BP Readings from Last 3 Encounters:   01/31/23 122/84   11/18/22 126/84   11/09/22 120/80          No results found for this visit on 01/31/23.   The 10-year ASCVD risk score (Mikel SMITH, et al., 2019) is: 5.9%    Values used to calculate the score:      Age: 59 years      Sex: Male      Is Non- : No      Diabetic: No      Tobacco smoker: No      Systolic Blood Pressure: 942 mmHg      Is BP treated: No      HDL Cholesterol: 65 MG/DL Total Cholesterol: 196 MG/DL  Lab Review   No visits with results within 2 Month(s) from this visit. Latest known visit with results is:   Office Visit on 08/25/2022   Component Date Value    HIV Ag/Ab 08/25/2022 Non-Reactive     HIV-1 Antibody 08/25/2022 Non-Reactive     HIV ANTIGEN 08/25/2022 Non-Reactive     HIV-2 Ab 08/25/2022 Non-Reactive     Sodium 08/25/2022 137     Potassium 08/25/2022 5.2 (A)     Chloride 08/25/2022 100     CO2 08/25/2022 25     Anion Gap 08/25/2022 12     Glucose 08/25/2022 84     BUN 08/25/2022 15     Creatinine 08/25/2022 1.1     GFR Non- 08/25/2022 >60     GFR  08/25/2022 >60     Calcium 08/25/2022 9.0     Total Protein 08/25/2022 6.8     Albumin 08/25/2022 3.9     Albumin/Globulin Ratio 08/25/2022 1.3     Total Bilirubin 08/25/2022 <0.2     Alkaline Phosphatase 08/25/2022 88     ALT 08/25/2022 18     AST 08/25/2022 16     WBC 08/25/2022 4.6     RBC 08/25/2022 4.34     Hemoglobin 08/25/2022 14.7     Hematocrit 08/25/2022 43.4     MCV 08/25/2022 99.9     MCH 08/25/2022 33.9     MCHC 08/25/2022 34.0     RDW 08/25/2022 15.0     Platelets 06/25/7672 370     MPV 08/25/2022 7.4     Neutrophils % 08/25/2022 67.8     Lymphocytes % 08/25/2022 19.0     Monocytes % 08/25/2022 11.2     Eosinophils % 08/25/2022 0.9     Basophils % 08/25/2022 1.1     Neutrophils Absolute 08/25/2022 3.1     Lymphocytes Absolute 08/25/2022 0.9 (A)     Monocytes Absolute 08/25/2022 0.5     Eosinophils Absolute 08/25/2022 0.0     Basophils Absolute 08/25/2022 0.0     TSH 08/25/2022 1.59     Vitamin B-12 08/25/2022 276     Vit D, 25-Hydroxy 08/25/2022 22.4 (A)            Assessment:       Diagnosis Orders   1. Insomnia, unspecified type  melatonin (RA MELATONIN) 3 MG TABS tablet      2. History of DVT of lower extremity        3. Need for shingles vaccine  zoster recombinant adjuvanted vaccine [facility]) 50 MCG/0.5ML SUSR injection      4. Weight gain        5.  History of pulmonary embolism  rivaroxaban (XARELTO) 20 MG TABS tablet      6. Hypothyroidism  levothyroxine (SYNTHROID) 100 MCG tablet      7. History of CVA in adulthood  atorvastatin (LIPITOR) 40 MG tablet      8. Polypharmacy        9. Impacted cerumen, right ear        10. Intellectual disability                Plan: Will hold off on Zyrtec RF today. Zyrtec can make him sleepy. Will re-assess need for antihistamine in the spring    Insomnia improved with Melatonin. Insomnia could have also been related to him adjusting to his new envirronment. Will consider stopping at another visit. Forgot to do ear irrigation at the end of the visit. Caregiver with him also forgot about it. Will allow them to come back in at another time if they want. Get shingles vaccine      Return in about 3 months (around 4/30/2023) for Insomnia, Gerd, Hypothyroid.

## 2023-01-31 NOTE — Clinical Note
I had told caregiver that we would try to do ear irrigation on patient today but we both forgot by the end of the visit.   Let them know they are welcome to come back

## 2023-02-01 ENCOUNTER — OFFICE VISIT (OUTPATIENT)
Dept: FAMILY MEDICINE CLINIC | Age: 58
End: 2023-02-01
Payer: MEDICAID

## 2023-02-01 VITALS — DIASTOLIC BLOOD PRESSURE: 80 MMHG | SYSTOLIC BLOOD PRESSURE: 140 MMHG | OXYGEN SATURATION: 99 % | HEART RATE: 56 BPM

## 2023-02-01 DIAGNOSIS — H61.21 IMPACTED CERUMEN, RIGHT EAR: Primary | ICD-10-CM

## 2023-02-01 PROCEDURE — 99213 OFFICE O/P EST LOW 20 MIN: CPT | Performed by: FAMILY MEDICINE

## 2023-02-01 PROCEDURE — 69210 REMOVE IMPACTED EAR WAX UNI: CPT | Performed by: FAMILY MEDICINE

## 2023-02-01 NOTE — LETTER
[place] [address]  Phone: [phone]  Fax: [phone]    Leonardo Monson MD        February 1, 2023    [facility] [address] [address] [facility]      Dear Jim Marietta:    Discontinue Zyrtec. If you have any questions or concerns, please don't hesitate to call.     Sincerely,        Leonardo Monson MD

## 2023-02-01 NOTE — LETTER
[place] [address] [address]  Phone: [phone]  Fax: [phone]    Shen Pan MD        February 1, 2023    Alfonzo Kawasaki  [address] [facility] [address], [address] [facility]      Dear Kiley Estrada has cerumen impaction of the right ear. He was not able to tolerate cerumen removal during his office visit today. Below please find instructions on how to remove the cerumen from his ear at home. 1.  Elane Antonia on his left side      2. Use medicine dropper to fill the right ear canal with mineral oil (baby oil)      3. Try to have him stay in this position for 10-15 minutes      4. Repeat process for 10 days    If you have any questions or concerns, please don't hesitate to call.     Sincerely,        Shen Pan MD

## 2023-02-01 NOTE — PROGRESS NOTES
Patient ID: Meme Martin 1965    Chief Complaint   Patient presents with    Cerumen Impaction     Right ear         HPI    Cerumen impaction: Caregiver says he is not able to hear very well. Patient Active Problem List   Diagnosis    Down's Syndrome    Hypothyroidism    Mutism    Thrombophilia (HCC)    Chronic anticoagulation    History of pulmonary embolism    Intellectual disability    Overweight (BMI 25.0-29. 9)    Vitamin D insufficiency    Empty sella (HCC)    History of CVA in adulthood    History of DVT of lower extremity       Past Surgical History:   Procedure Laterality Date    COLONOSCOPY N/A 11/3/2022    COLONOSCOPY DIAGNOSTIC performed by Kelsey Cruz MD at [address] 11/3/2022    EGD DIAGNOSTIC ONLY performed by Kelsey Cruz MD at [facility] ENDOSCOPY       No family history on file. Current Outpatient Medications on File Prior to Visit   Medication Sig Dispense Refill    cetirizine (ZYRTEC) 10 MG tablet Take 10 mg by mouth daily      rivaroxaban (XARELTO) 20 MG TABS tablet Take 1 tablet by mouth daily 30 tablet 11    levothyroxine (SYNTHROID) 100 MCG tablet Take 1 tablet by mouth Daily 30 tablet 11    atorvastatin (LIPITOR) 40 MG tablet Take 1 tablet by mouth daily 30 tablet 11    melatonin (RA MELATONIN) 3 MG TABS tablet Take 1 tablet by mouth Daily with supper 30 tablet 11    Cholecalciferol (VITAMIN D) 125 MCG (5000 UT) CAPS Take 125 mcg by mouth daily 30 capsule 11    famotidine (PEPCID) 40 MG tablet Take 1 tablet by mouth 2 times daily 60 tablet 11     No current facility-administered medications on file prior to visit. Objective:           Physical Exam  HENT:      Right Ear: There is impacted cerumen. Vitals:    02/01/23 1040 02/01/23 1042   BP: (!) 146/84 (!) 140/80   Pulse: 56    SpO2: 99%      There is no height or weight on file to calculate BMI.      Wt Readings from Last 3 Encounters:   01/31/23 158 lb 9.6 oz (71.9 kg)   11/18/22 154 lb (69.9 kg)   11/09/22 155 lb 3.2 oz (70.4 kg)     BP Readings from Last 3 Encounters:   02/01/23 (!) 140/80   01/31/23 122/84   11/18/22 126/84          No results found for this visit on 02/01/23. Assessment:       Diagnosis Orders   1. Impacted cerumen, right ear  64[address] - [place] REMOVE IMPACTED EAR WAX              Plan:      The Right ear canal was visualized and was noted to be obstructed by cerumen. Curretage was not successful and then the Right was then irrigated with warm tap water and a syringe. The patient did not tolerate the procedure well. The ear was visualized and was not normal after the procedure. Wax had been removed, but there was still wax deep inside his ear canal.  Therefore, the following recommendations were made below. 1.  Tamela Lombardo on his left side      2. Use medicine dropper to fill the right ear canal with mineral oil (baby oil)      3. Try to have him stay in this position for 10-15 minutes      4. Repeat process for 10 days    No follow-ups on file.

## 2023-02-28 ENCOUNTER — TELEPHONE (OUTPATIENT)
Dept: FAMILY MEDICINE CLINIC | Age: 58
End: 2023-02-28

## 2023-02-28 NOTE — TELEPHONE ENCOUNTER
Lesly hunter dropped off a urine sample for the patient. There is no orders in epic for a urine sample. Last time patient was seen was on 2/1/2023 for impacted cerumen, and before that appointment patient was seen for Insomnia.  Please advise on what to do with the patients urine

## 2023-03-10 DIAGNOSIS — Z86.73 HISTORY OF CVA IN ADULTHOOD: ICD-10-CM

## 2023-03-13 RX ORDER — ATORVASTATIN CALCIUM 40 MG/1
40 TABLET, FILM COATED ORAL DAILY
Qty: 30 TABLET | Refills: 2 | OUTPATIENT
Start: 2023-03-13

## 2023-06-05 ENCOUNTER — OFFICE VISIT (OUTPATIENT)
Dept: FAMILY MEDICINE CLINIC | Age: 58
End: 2023-06-05
Payer: MEDICAID

## 2023-06-05 VITALS
OXYGEN SATURATION: 94 % | WEIGHT: 157 LBS | SYSTOLIC BLOOD PRESSURE: 122 MMHG | BODY MASS INDEX: 30.66 KG/M2 | HEART RATE: 84 BPM | DIASTOLIC BLOOD PRESSURE: 80 MMHG

## 2023-06-05 DIAGNOSIS — Z79.01 CHRONIC ANTICOAGULATION: ICD-10-CM

## 2023-06-05 DIAGNOSIS — F79 INTELLECTUAL DISABILITY: Primary | ICD-10-CM

## 2023-06-05 DIAGNOSIS — Z86.73 HISTORY OF CVA IN ADULTHOOD: ICD-10-CM

## 2023-06-05 DIAGNOSIS — E07.9 THYROID DISEASE: ICD-10-CM

## 2023-06-05 DIAGNOSIS — F72 SEVERE INTELLECTUAL DISABILITY WITH INTELLIGENCE QUOTIENT 20 TO 34: ICD-10-CM

## 2023-06-05 DIAGNOSIS — K21.9 GASTROESOPHAGEAL REFLUX DISEASE, UNSPECIFIED WHETHER ESOPHAGITIS PRESENT: ICD-10-CM

## 2023-06-05 DIAGNOSIS — E55.9 VITAMIN D INSUFFICIENCY: ICD-10-CM

## 2023-06-05 PROCEDURE — 99213 OFFICE O/P EST LOW 20 MIN: CPT | Performed by: FAMILY MEDICINE

## 2023-06-05 PROCEDURE — 36415 COLL VENOUS BLD VENIPUNCTURE: CPT | Performed by: FAMILY MEDICINE

## 2023-06-05 RX ORDER — FAMOTIDINE 40 MG/1
40 TABLET, FILM COATED ORAL 2 TIMES DAILY
Qty: 60 TABLET | Refills: 11 | Status: SHIPPED | OUTPATIENT
Start: 2023-06-05

## 2023-06-05 RX ORDER — CHOLECALCIFEROL (VITAMIN D3) 125 MCG
125 CAPSULE ORAL DAILY
Qty: 30 CAPSULE | Refills: 11 | Status: SHIPPED | OUTPATIENT
Start: 2023-06-05

## 2023-06-05 NOTE — PROGRESS NOTES
levothyroxine (SYNTHROID) 100 MCG tablet Take 1 tablet by mouth Daily 30 tablet 11    atorvastatin (LIPITOR) 40 MG tablet Take 1 tablet by mouth daily 30 tablet 11    melatonin (RA MELATONIN) 3 MG TABS tablet Take 1 tablet by mouth Daily with supper 30 tablet 11    cetirizine (ZYRTEC) 10 MG tablet Take 10 mg by mouth daily (Patient not taking: Reported on 6/5/2023)       No current facility-administered medications on file prior to visit. Objective:         Physical Exam  Vitals and nursing note reviewed. Constitutional:       Appearance: He is well-developed. HENT:      Head: Atraumatic. Cardiovascular:      Rate and Rhythm: Normal rate and regular rhythm. Heart sounds: Normal heart sounds, S1 normal and S2 normal.   Pulmonary:      Effort: No respiratory distress. Breath sounds: Normal breath sounds. No wheezing or rales. Skin:     General: Skin is warm and dry. Neurological:      Mental Status: He is alert. Mental status is at baseline. Psychiatric:         Mood and Affect: Mood normal.         Speech: Speech is delayed. Behavior: Behavior is slowed. Behavior is cooperative. Cognition and Memory: Cognition is impaired. Memory is impaired. Vitals:    06/05/23 0945   BP: 122/80   Site: Left Upper Arm   Position: Sitting   Cuff Size: Medium Adult   Pulse: 84   SpO2: 94%   Weight: 157 lb (71.2 kg)     Body mass index is 30.66 kg/m². Wt Readings from Last 3 Encounters:   06/05/23 157 lb (71.2 kg)   01/31/23 158 lb 9.6 oz (71.9 kg)   11/18/22 154 lb (69.9 kg)     BP Readings from Last 3 Encounters:   06/05/23 122/80   02/01/23 (!) 140/80   01/31/23 122/84          No results found for this visit on 06/05/23.   The 10-year ASCVD risk score (Mikel SMITH, et al., 2019) is: 5.9%    Values used to calculate the score:      Age: 59 years      Sex: Male      Is Non- : No      Diabetic: No      Tobacco smoker: No      Systolic Blood Pressure:

## 2023-06-06 DIAGNOSIS — E07.9 THYROID DISEASE: Primary | ICD-10-CM

## 2023-06-06 LAB
CHOLEST SERPL-MCNC: 126 MG/DL (ref 0–199)
HDLC SERPL-MCNC: 55 MG/DL (ref 40–60)
LDLC SERPL CALC-MCNC: 57 MG/DL
T4 FREE SERPL-MCNC: 1.5 NG/DL (ref 0.9–1.8)
TRIGL SERPL-MCNC: 71 MG/DL (ref 0–150)
TSH SERPL DL<=0.005 MIU/L-ACNC: 7.84 UIU/ML (ref 0.27–4.2)
VLDLC SERPL CALC-MCNC: 14 MG/DL

## 2023-06-06 RX ORDER — LEVOTHYROXINE SODIUM 0.12 MG/1
125 TABLET ORAL DAILY
Qty: 30 TABLET | Refills: 11 | Status: SHIPPED | OUTPATIENT
Start: 2023-06-06

## 2023-06-07 ENCOUNTER — TELEPHONE (OUTPATIENT)
Dept: FAMILY MEDICINE CLINIC | Age: 58
End: 2023-06-07

## 2023-06-07 NOTE — TELEPHONE ENCOUNTER
Afsaneh Davis called asking for clarification of levothyroxine. Levothyroxine 125mcg script was sent Benjamin. However the letter written, stated 150mcg.       Please advise

## 2023-07-18 LAB — TSH SERPL DL<=0.05 MIU/L-ACNC: 1.3 UIU/ML

## 2023-09-08 ENCOUNTER — IMMUNIZATION (OUTPATIENT)
Dept: FAMILY MEDICINE CLINIC | Age: 58
End: 2023-09-08
Payer: MEDICAID

## 2023-09-08 DIAGNOSIS — Z23 NEED FOR INFLUENZA VACCINATION: Primary | ICD-10-CM

## 2023-09-08 PROCEDURE — 90674 CCIIV4 VAC NO PRSV 0.5 ML IM: CPT | Performed by: FAMILY MEDICINE

## 2023-09-08 PROCEDURE — 90471 IMMUNIZATION ADMIN: CPT | Performed by: FAMILY MEDICINE

## 2023-10-10 ENCOUNTER — HOSPITAL ENCOUNTER (OUTPATIENT)
Age: 58
Discharge: HOME | End: 2023-10-10
Payer: MEDICAID

## 2023-10-10 ENCOUNTER — HOSPITAL ENCOUNTER (OUTPATIENT)
Dept: GENERAL RADIOLOGY | Age: 58
Discharge: HOME | End: 2023-10-10
Payer: MEDICAID

## 2023-10-10 ENCOUNTER — OFFICE VISIT (OUTPATIENT)
Dept: FAMILY MEDICINE CLINIC | Age: 58
End: 2023-10-10
Payer: MEDICAID

## 2023-10-10 VITALS
DIASTOLIC BLOOD PRESSURE: 94 MMHG | RESPIRATION RATE: 17 BRPM | HEIGHT: 60 IN | SYSTOLIC BLOOD PRESSURE: 138 MMHG | BODY MASS INDEX: 32.2 KG/M2 | WEIGHT: 164 LBS | HEART RATE: 76 BPM | OXYGEN SATURATION: 97 % | TEMPERATURE: 97.6 F

## 2023-10-10 DIAGNOSIS — E07.9 THYROID DISEASE: ICD-10-CM

## 2023-10-10 DIAGNOSIS — E23.6 EMPTY SELLA (HCC): ICD-10-CM

## 2023-10-10 DIAGNOSIS — Z13.1 SCREENING FOR DIABETES MELLITUS: ICD-10-CM

## 2023-10-10 DIAGNOSIS — R32 URINARY INCONTINENCE, UNSPECIFIED TYPE: ICD-10-CM

## 2023-10-10 DIAGNOSIS — R79.89 LOW SERUM SODIUM: Primary | ICD-10-CM

## 2023-10-10 DIAGNOSIS — K59.00 CONSTIPATION, UNSPECIFIED CONSTIPATION TYPE: Primary | ICD-10-CM

## 2023-10-10 DIAGNOSIS — F79 INTELLECTUAL DISABILITY: ICD-10-CM

## 2023-10-10 DIAGNOSIS — R35.0 FREQUENT URINATION: Primary | ICD-10-CM

## 2023-10-10 DIAGNOSIS — E55.9 VITAMIN D INSUFFICIENCY: ICD-10-CM

## 2023-10-10 DIAGNOSIS — R46.89 BEHAVIORAL CHANGE: ICD-10-CM

## 2023-10-10 LAB
ALBUMIN SERPL-MCNC: 3.9 GM/DL (ref 3.4–5)
ALP BLD-CCNC: 103 IU/L (ref 40–128)
ALT SERPL-CCNC: 27 U/L (ref 10–40)
ANION GAP SERPL CALCULATED.3IONS-SCNC: 9 MMOL/L (ref 4–16)
AST SERPL-CCNC: 19 IU/L (ref 15–37)
BASOPHILS ABSOLUTE: 0.1 K/CU MM
BASOPHILS RELATIVE PERCENT: 1 % (ref 0–1)
BILIRUB SERPL-MCNC: 0.4 MG/DL (ref 0–1)
BILIRUBIN, POC: NORMAL
BLOOD URINE, POC: NORMAL
BUN SERPL-MCNC: 13 MG/DL (ref 6–23)
CALCIUM SERPL-MCNC: 9.3 MG/DL (ref 8.3–10.6)
CHLORIDE BLD-SCNC: 94 MMOL/L (ref 99–110)
CHP ED QC CHECK: NORMAL
CLARITY, POC: CLEAR
CO2: 29 MMOL/L (ref 21–32)
COLOR, POC: YELLOW
CREAT SERPL-MCNC: 0.8 MG/DL (ref 0.9–1.3)
DIFFERENTIAL TYPE: ABNORMAL
EOSINOPHILS ABSOLUTE: 0.1 K/CU MM
EOSINOPHILS RELATIVE PERCENT: 1 % (ref 0–3)
GFR SERPL CREATININE-BSD FRML MDRD: >60 ML/MIN/1.73M2
GLUCOSE BLD-MCNC: 83 MG/DL
GLUCOSE SERPL-MCNC: 104 MG/DL (ref 70–99)
GLUCOSE URINE, POC: NORMAL
HCT VFR BLD CALC: 46.2 % (ref 42–52)
HEMOGLOBIN: 15.2 GM/DL (ref 13.5–18)
IMMATURE NEUTROPHIL %: 1 % (ref 0–0.43)
KETONES, POC: NORMAL
LEUKOCYTE EST, POC: NORMAL
LYMPHOCYTES ABSOLUTE: 1.1 K/CU MM
LYMPHOCYTES RELATIVE PERCENT: 21.3 % (ref 24–44)
MCH RBC QN AUTO: 32.6 PG (ref 27–31)
MCHC RBC AUTO-ENTMCNC: 32.9 % (ref 32–36)
MCV RBC AUTO: 99.1 FL (ref 78–100)
MONOCYTES ABSOLUTE: 0.6 K/CU MM
MONOCYTES RELATIVE PERCENT: 10.7 % (ref 0–4)
NITRITE, POC: NORMAL
NUCLEATED RBC %: 0 %
PDW BLD-RTO: 14.3 % (ref 11.7–14.9)
PH, POC: 7
PLATELET # BLD: 461 K/CU MM (ref 140–440)
PMV BLD AUTO: 9.4 FL (ref 7.5–11.1)
POTASSIUM SERPL-SCNC: 4.8 MMOL/L (ref 3.5–5.1)
PROTEIN, POC: NORMAL
RBC # BLD: 4.66 M/CU MM (ref 4.6–6.2)
SEGMENTED NEUTROPHILS ABSOLUTE COUNT: 3.4 K/CU MM
SEGMENTED NEUTROPHILS RELATIVE PERCENT: 65 % (ref 36–66)
SODIUM BLD-SCNC: 132 MMOL/L (ref 135–145)
SPECIFIC GRAVITY, POC: 1.02
TOTAL IMMATURE NEUTOROPHIL: 0.05 K/CU MM
TOTAL NUCLEATED RBC: 0 K/CU MM
TOTAL PROTEIN: 7.3 GM/DL (ref 6.4–8.2)
TSH SERPL DL<=0.005 MIU/L-ACNC: 1.82 UIU/ML (ref 0.27–4.2)
UROBILINOGEN, POC: 1
WBC # BLD: 5.2 K/CU MM (ref 4–10.5)

## 2023-10-10 PROCEDURE — 82962 GLUCOSE BLOOD TEST: CPT | Performed by: FAMILY MEDICINE

## 2023-10-10 PROCEDURE — 36415 COLL VENOUS BLD VENIPUNCTURE: CPT

## 2023-10-10 PROCEDURE — 84443 ASSAY THYROID STIM HORMONE: CPT

## 2023-10-10 PROCEDURE — 74019 RADEX ABDOMEN 2 VIEWS: CPT

## 2023-10-10 PROCEDURE — 99214 OFFICE O/P EST MOD 30 MIN: CPT | Performed by: FAMILY MEDICINE

## 2023-10-10 PROCEDURE — 85025 COMPLETE CBC W/AUTO DIFF WBC: CPT

## 2023-10-10 PROCEDURE — 80053 COMPREHEN METABOLIC PANEL: CPT

## 2023-10-10 PROCEDURE — 81002 URINALYSIS NONAUTO W/O SCOPE: CPT | Performed by: FAMILY MEDICINE

## 2023-10-10 RX ORDER — CHOLECALCIFEROL TAB 125 MCG (5000 UNIT) 125 MCG (5000 UT)
1 TAB DAILY
Qty: 30 TABLET | Refills: 11 | OUTPATIENT
Start: 2023-10-10

## 2023-10-10 RX ORDER — DIAPER,BRIEF,ADULT, DISPOSABLE
1 EACH MISCELLANEOUS 4 TIMES DAILY
Qty: 100 EACH | Refills: 0 | Status: SHIPPED | OUTPATIENT
Start: 2023-10-10

## 2023-10-10 RX ORDER — POLYETHYLENE GLYCOL 3350 17 G/17G
17 POWDER, FOR SOLUTION ORAL 2 TIMES DAILY
Qty: 116 G | Refills: 0 | Status: SHIPPED | OUTPATIENT
Start: 2023-10-10 | End: 2023-10-11 | Stop reason: SDUPTHER

## 2023-10-10 NOTE — PROGRESS NOTES
Patient ID: Royer Teresa 1965    . Chief Complaint   Patient presents with    Urinary Frequency     X 2 days and needs a script for depends send [address]         HPI  Urine frequency and incontinence: ongoing x 2 days. Large amount of urine when he urinates. Caregiver who is here had to clean up the floor at his home. Patient normally continent of urine. No reports to this caregiver that patient is acting differently, but she thinks he may be out of it some. No reports of constipation or stool incontience. Patient Active Problem List   Diagnosis    Down's Syndrome    Hypothyroidism    Mutism    Chronic anticoagulation    History of pulmonary embolism    Intellectual disability    Overweight (BMI 25.0-29. 9)    Vitamin D insufficiency    Empty sella (HCC)    History of CVA in adulthood    History of DVT of lower extremity       Past Surgical History:   Procedure Laterality Date    COLONOSCOPY N/A 11/3/2022    COLONOSCOPY DIAGNOSTIC performed by Elton Mata MD at [facility] 11/3/2022    EGD DIAGNOSTIC ONLY performed by Elton Mata MD at [facility] ENDOSCOPY       No family history on file. Current Outpatient Medications on File Prior to Visit   Medication Sig Dispense Refill    levothyroxine (SYNTHROID) 125 MCG tablet Take 1 tablet by mouth daily 30 tablet 11    Cholecalciferol (VITAMIN D) 125 MCG (5000 UT) CAPS Take 125 mcg by mouth daily 30 capsule 11    famotidine (PEPCID) 40 MG tablet Take 1 tablet by mouth 2 times daily 60 tablet 11    cetirizine (ZYRTEC) 10 MG tablet Take 1 tablet by mouth daily      rivaroxaban (XARELTO) 20 MG TABS tablet Take 1 tablet by mouth daily 30 tablet 11    atorvastatin (LIPITOR) 40 MG tablet Take 1 tablet by mouth daily 30 tablet 11    melatonin (RA MELATONIN) 3 MG TABS tablet Take 1 tablet by mouth Daily with supper 30 tablet 11     No current facility-administered medications on file prior to visit.

## 2023-10-11 ENCOUNTER — TELEPHONE (OUTPATIENT)
Dept: FAMILY MEDICINE CLINIC | Age: 58
End: 2023-10-11

## 2023-10-11 DIAGNOSIS — K59.00 CONSTIPATION, UNSPECIFIED CONSTIPATION TYPE: ICD-10-CM

## 2023-10-11 LAB — BACTERIA UR CULT: NORMAL

## 2023-10-11 RX ORDER — POLYETHYLENE GLYCOL 3350 17 G/17G
17 POWDER, FOR SOLUTION ORAL 2 TIMES DAILY
Qty: 116 G | Refills: 0 | Status: SHIPPED | OUTPATIENT
Start: 2023-10-11 | End: 2023-10-16

## 2023-10-11 NOTE — TELEPHONE ENCOUNTER
Yes, he still needs to be seen October 19 with blood work done prior. Can return to work on Monday (to allow him to have easy access to bathroom for bowel movements)    Yes, the script is correct. This is not a forever medication. Will print letter for the medication. Please create letter to return to work.

## 2023-10-11 NOTE — TELEPHONE ENCOUNTER
Ru Rubin called back stating there isn't enough doses of the Glycolx she stated there is only 7 doses in the bottle and not 9.  She will need a refill so the patient can have his 9 doses

## 2023-10-11 NOTE — TELEPHONE ENCOUNTER
Claudean Harada called patient is starting on the miralax today at 8pm.   Patient has vomited twice since yesterday. Claudean Harada will report if patient continues to vomit.

## 2023-10-11 NOTE — TELEPHONE ENCOUNTER
Susan Kent asked do you still want to see patient for follow up appt on 10/1923 since she was given lab results. If so , is the blood work to be done prior to the  appointment on 10/19/23. Or the appointment  scheduled in November. Susan Kent asking for return to work note and when can patient return to work. Susan Kent picked up the Glycolx. The Sig states Take 17g by mouth 2 times daily for 9 doses. Susan Kent asked for letter for duration. This is 3.5 days. Is this routine? There are no refills?

## 2023-10-19 ENCOUNTER — OFFICE VISIT (OUTPATIENT)
Dept: FAMILY MEDICINE CLINIC | Age: 58
End: 2023-10-19
Payer: MEDICAID

## 2023-10-19 VITALS
OXYGEN SATURATION: 98 % | BODY MASS INDEX: 32.2 KG/M2 | WEIGHT: 164 LBS | RESPIRATION RATE: 16 BRPM | SYSTOLIC BLOOD PRESSURE: 116 MMHG | DIASTOLIC BLOOD PRESSURE: 78 MMHG | HEIGHT: 60 IN | HEART RATE: 73 BPM

## 2023-10-19 DIAGNOSIS — K59.00 CONSTIPATION, UNSPECIFIED CONSTIPATION TYPE: Primary | ICD-10-CM

## 2023-10-19 PROCEDURE — 99213 OFFICE O/P EST LOW 20 MIN: CPT | Performed by: FAMILY MEDICINE

## 2023-10-19 SDOH — ECONOMIC STABILITY: FOOD INSECURITY: WITHIN THE PAST 12 MONTHS, YOU WORRIED THAT YOUR FOOD WOULD RUN OUT BEFORE YOU GOT MONEY TO BUY MORE.: PATIENT DECLINED

## 2023-10-19 SDOH — ECONOMIC STABILITY: INCOME INSECURITY: HOW HARD IS IT FOR YOU TO PAY FOR THE VERY BASICS LIKE FOOD, HOUSING, MEDICAL CARE, AND HEATING?: PATIENT DECLINED

## 2023-10-19 SDOH — ECONOMIC STABILITY: FOOD INSECURITY: WITHIN THE PAST 12 MONTHS, THE FOOD YOU BOUGHT JUST DIDN'T LAST AND YOU DIDN'T HAVE MONEY TO GET MORE.: PATIENT DECLINED

## 2023-10-19 SDOH — ECONOMIC STABILITY: HOUSING INSECURITY
IN THE LAST 12 MONTHS, WAS THERE A TIME WHEN YOU DID NOT HAVE A STEADY PLACE TO SLEEP OR SLEPT IN A SHELTER (INCLUDING NOW)?: PATIENT REFUSED

## 2023-10-19 NOTE — PROGRESS NOTES
Patient ID: Fern Aguilar 1965    Chief Complaint   Patient presents with    Follow-up    Discuss Medications     Discuss Miralax          HPI    Follow-up of urinary incontinence: Caregiver reports that there has been no mention of any further urinary incontinence. No concerns over change in stool habits. She is wondering if the MiraLAX should be discontinued. Patient Active Problem List   Diagnosis    Down's Syndrome    Hypothyroidism    Mutism    Chronic anticoagulation    History of pulmonary embolism    Intellectual disability    Overweight (BMI 25.0-29. 9)    Vitamin D insufficiency    Empty sella (HCC)    History of CVA in adulthood    History of DVT of lower extremity       Past Surgical History:   Procedure Laterality Date    COLONOSCOPY N/A 11/3/2022    COLONOSCOPY DIAGNOSTIC performed by Lani Cobian MD at [address] [facility] 11/3/2022    EGD DIAGNOSTIC ONLY performed by Lani Cobian MD at [facility] ENDOSCOPY       No family history on file. Current Outpatient Medications on File Prior to Visit   Medication Sig Dispense Refill    Incontinence Supply Disposable (DEPEND UNDERWEAR SM/MED) MISC 1 Units by Does not apply route 4 times daily 100 each 0    levothyroxine (SYNTHROID) 125 MCG tablet Take 1 tablet by mouth daily 30 tablet 11    Cholecalciferol (VITAMIN D) 125 MCG (5000 UT) CAPS Take 125 mcg by mouth daily 30 capsule 11    famotidine (PEPCID) 40 MG tablet Take 1 tablet by mouth 2 times daily 60 tablet 11    cetirizine (ZYRTEC) 10 MG tablet Take 1 tablet by mouth daily      rivaroxaban (XARELTO) 20 MG TABS tablet Take 1 tablet by mouth daily 30 tablet 11    atorvastatin (LIPITOR) 40 MG tablet Take 1 tablet by mouth daily 30 tablet 11    melatonin (RA MELATONIN) 3 MG TABS tablet Take 1 tablet by mouth Daily with supper 30 tablet 11     No current facility-administered medications on file prior to visit.                    Objective:           Physical

## 2023-10-30 ENCOUNTER — OFFICE VISIT (OUTPATIENT)
Dept: FAMILY MEDICINE CLINIC | Age: 58
End: 2023-10-30
Payer: MEDICAID

## 2023-10-30 VITALS
SYSTOLIC BLOOD PRESSURE: 142 MMHG | HEART RATE: 78 BPM | DIASTOLIC BLOOD PRESSURE: 100 MMHG | RESPIRATION RATE: 17 BRPM | OXYGEN SATURATION: 97 % | WEIGHT: 158 LBS | BODY MASS INDEX: 30.86 KG/M2 | TEMPERATURE: 97.8 F

## 2023-10-30 DIAGNOSIS — Z12.11 SCREEN FOR COLON CANCER: ICD-10-CM

## 2023-10-30 DIAGNOSIS — K56.41 IMPACTED STOOL IN RECTUM (HCC): ICD-10-CM

## 2023-10-30 DIAGNOSIS — R58 BLOOD IN UNDERWEAR: Primary | ICD-10-CM

## 2023-10-30 DIAGNOSIS — B35.6 TINEA CRURIS: ICD-10-CM

## 2023-10-30 LAB
BILIRUBIN, POC: ABNORMAL
BLOOD URINE, POC: ABNORMAL
CLARITY, POC: ABNORMAL
COLOR, POC: YELLOW
GLUCOSE URINE, POC: ABNORMAL
KETONES, POC: ABNORMAL
LEUKOCYTE EST, POC: ABNORMAL
NITRITE, POC: ABNORMAL
PH, POC: 6
PROTEIN, POC: ABNORMAL
SPECIFIC GRAVITY, POC: 1.02
UROBILINOGEN, POC: 0.2

## 2023-10-30 PROCEDURE — 81002 URINALYSIS NONAUTO W/O SCOPE: CPT | Performed by: FAMILY MEDICINE

## 2023-10-30 PROCEDURE — 99214 OFFICE O/P EST MOD 30 MIN: CPT | Performed by: FAMILY MEDICINE

## 2023-10-30 RX ORDER — KETOCONAZOLE 20 MG/G
CREAM TOPICAL 2 TIMES DAILY
Qty: 30 G | Refills: 0 | Status: SHIPPED | OUTPATIENT
Start: 2023-10-30 | End: 2023-11-13

## 2023-10-30 RX ORDER — CALCIUM POLYCARBOPHIL 625 MG
1875 TABLET ORAL DAILY
Qty: 90 TABLET | Refills: 11 | Status: SHIPPED | OUTPATIENT
Start: 2023-10-30 | End: 2024-10-24

## 2023-10-30 ASSESSMENT — PATIENT HEALTH QUESTIONNAIRE - PHQ9: DEPRESSION UNABLE TO ASSESS: PT REFUSES

## 2023-10-30 NOTE — PROGRESS NOTES
Patient ID: Susana Bullard 1965    Chief Complaint   Patient presents with    Hematuria         HPI    Blood in underwear: staff found 3 drops of blood in his underwear. Caregiver who is here does not know what part of the underwear it was found in. Patient behaving per his norm. No change in stools or continence of urine per caregiver      Patient Active Problem List   Diagnosis    Down's Syndrome    Hypothyroidism    Mutism    Chronic anticoagulation    History of pulmonary embolism    Intellectual disability    Overweight (BMI 25.0-29. 9)    Vitamin D insufficiency    Empty sella (HCC)    History of CVA in adulthood    History of DVT of lower extremity       Past Surgical History:   Procedure Laterality Date    COLONOSCOPY N/A 11/3/2022    COLONOSCOPY DIAGNOSTIC performed by Aashish Case MD at [address] [facility] 11/3/2022    EGD DIAGNOSTIC ONLY performed by Aashish Case MD at [address] [facility] ENDOSCOPY       No family history on file. Current Outpatient Medications on File Prior to Visit   Medication Sig Dispense Refill    Incontinence Supply Disposable (DEPEND UNDERWEAR SM/MED) MISC 1 Units by Does not apply route 4 times daily 100 each 0    levothyroxine (SYNTHROID) 125 MCG tablet Take 1 tablet by mouth daily 30 tablet 11    Cholecalciferol (VITAMIN D) 125 MCG (5000 UT) CAPS Take 125 mcg by mouth daily 30 capsule 11    famotidine (PEPCID) 40 MG tablet Take 1 tablet by mouth 2 times daily 60 tablet 11    rivaroxaban (XARELTO) 20 MG TABS tablet Take 1 tablet by mouth daily 30 tablet 11    atorvastatin (LIPITOR) 40 MG tablet Take 1 tablet by mouth daily 30 tablet 11    melatonin (RA MELATONIN) 3 MG TABS tablet Take 1 tablet by mouth Daily with supper 30 tablet 11    cetirizine (ZYRTEC) 10 MG tablet Take 1 tablet by mouth daily (Patient not taking: Reported on 10/30/2023)       No current facility-administered medications on file prior to visit.                    Objective:

## 2023-11-01 LAB — BACTERIA UR CULT: NORMAL

## 2023-11-06 ENCOUNTER — OFFICE VISIT (OUTPATIENT)
Dept: FAMILY MEDICINE CLINIC | Age: 58
End: 2023-11-06
Payer: MEDICAID

## 2023-11-06 VITALS
SYSTOLIC BLOOD PRESSURE: 142 MMHG | BODY MASS INDEX: 31.44 KG/M2 | WEIGHT: 161 LBS | DIASTOLIC BLOOD PRESSURE: 80 MMHG | HEART RATE: 84 BPM | OXYGEN SATURATION: 97 %

## 2023-11-06 DIAGNOSIS — K59.00 CONSTIPATION, UNSPECIFIED CONSTIPATION TYPE: ICD-10-CM

## 2023-11-06 DIAGNOSIS — K21.9 GASTROESOPHAGEAL REFLUX DISEASE, UNSPECIFIED WHETHER ESOPHAGITIS PRESENT: ICD-10-CM

## 2023-11-06 DIAGNOSIS — K90.49 FOOD INTOLERANCE: Primary | ICD-10-CM

## 2023-11-06 DIAGNOSIS — Z23 NEED FOR COVID-19 VACCINE: ICD-10-CM

## 2023-11-06 DIAGNOSIS — F79 INTELLECTUAL DISABILITY: ICD-10-CM

## 2023-11-06 DIAGNOSIS — R03.0 ELEVATED BLOOD PRESSURE READING: ICD-10-CM

## 2023-11-06 DIAGNOSIS — R35.0 URINARY FREQUENCY: ICD-10-CM

## 2023-11-06 LAB
BILIRUBIN, POC: NORMAL
BLOOD URINE, POC: NORMAL
CLARITY, POC: CLEAR
COLOR, POC: YELLOW
GLUCOSE URINE, POC: NORMAL
KETONES, POC: NORMAL
LEUKOCYTE EST, POC: NORMAL
NITRITE, POC: NORMAL
PH, POC: 6.5
PROTEIN, POC: NORMAL
SPECIFIC GRAVITY, POC: 1.02
UROBILINOGEN, POC: 0.2

## 2023-11-06 PROCEDURE — 99214 OFFICE O/P EST MOD 30 MIN: CPT | Performed by: FAMILY MEDICINE

## 2023-11-06 PROCEDURE — 81002 URINALYSIS NONAUTO W/O SCOPE: CPT | Performed by: FAMILY MEDICINE

## 2023-11-06 RX ORDER — FAMOTIDINE 20 MG/1
20 TABLET, FILM COATED ORAL NIGHTLY
Qty: 30 TABLET | Refills: 1 | Status: SHIPPED | OUTPATIENT
Start: 2023-11-06

## 2023-11-06 ASSESSMENT — PATIENT HEALTH QUESTIONNAIRE - PHQ9: DEPRESSION UNABLE TO ASSESS: PT REFUSES

## 2023-11-06 NOTE — PROGRESS NOTES
d/c pepcid if still doing well. Next month, can discuss stopping Melatonin    Hx entirely per caregiver as patient is MRDD and nonverbal.    Still has tinea: continue the Nizoral and keep skin dry    Switch the fiber capsules to mornings. Take with 8 oz water          Return in about 1 month (around 12/6/2023) for Constipation, tinea cruris, GERD, .

## 2023-11-17 ENCOUNTER — OFFICE VISIT (OUTPATIENT)
Dept: FAMILY MEDICINE CLINIC | Age: 58
End: 2023-11-17
Payer: MEDICAID

## 2023-11-17 ENCOUNTER — HOSPITAL ENCOUNTER (OUTPATIENT)
Age: 58
Setting detail: SPECIMEN
Discharge: HOME | End: 2023-11-17
Payer: MEDICAID

## 2023-11-17 VITALS
SYSTOLIC BLOOD PRESSURE: 122 MMHG | BODY MASS INDEX: 31.17 KG/M2 | OXYGEN SATURATION: 97 % | DIASTOLIC BLOOD PRESSURE: 82 MMHG | HEART RATE: 86 BPM | TEMPERATURE: 97.8 F | WEIGHT: 159.6 LBS

## 2023-11-17 DIAGNOSIS — F79 INTELLECTUAL DISABILITY: ICD-10-CM

## 2023-11-17 DIAGNOSIS — R05.1 ACUTE COUGH: Primary | ICD-10-CM

## 2023-11-17 DIAGNOSIS — F51.01 PRIMARY INSOMNIA: ICD-10-CM

## 2023-11-17 LAB
SARS-COV-2 RNA RESP QL NAA+PROBE: DETECTED
SOURCE: ABNORMAL

## 2023-11-17 PROCEDURE — 87635 SARS-COV-2 COVID-19 AMP PRB: CPT

## 2023-11-17 PROCEDURE — 99213 OFFICE O/P EST LOW 20 MIN: CPT | Performed by: FAMILY MEDICINE

## 2023-11-17 NOTE — PROGRESS NOTES
Patient ID: Johnny Keane 1965    Chief Complaint   Patient presents with    Cough    Nasal Congestion    Fever         HPI    Cough: nasal congestion. Cough. X 24 hours. Eating ok. Tired per his usual.      Insomnia: takes naps 2 pm.  Some difficulty falling asleep at night still    Hx per caregiver, as patient is nonverbal.      Patient Active Problem List   Diagnosis    Down's Syndrome    Hypothyroidism    Mutism    Chronic anticoagulation    History of pulmonary embolism    Intellectual disability    Overweight (BMI 25.0-29. 9)    Vitamin D insufficiency    Empty sella (HCC)    History of CVA in adulthood    History of DVT of lower extremity       Past Surgical History:   Procedure Laterality Date    COLONOSCOPY N/A 11/3/2022    COLONOSCOPY DIAGNOSTIC performed by Ajith Rogers MD at [address] [facility] 11/3/2022    EGD DIAGNOSTIC ONLY performed by Ajith Rogers MD at [address] [facility] ENDOSCOPY       No family history on file.     Current Outpatient Medications on File Prior to Visit   Medication Sig Dispense Refill    famotidine (PEPCID) 20 MG tablet Take 1 tablet by mouth nightly 30 tablet 1    Calcium Polycarbophil (FIBER) 625 MG TABS Take 3 tablets by mouth daily 90 tablet 11    Incontinence Supply Disposable (DEPEND UNDERWEAR SM/MED) MISC 1 Units by Does not apply route 4 times daily 100 each 0    levothyroxine (SYNTHROID) 125 MCG tablet Take 1 tablet by mouth daily 30 tablet 11    Cholecalciferol (VITAMIN D) 125 MCG (5000 UT) CAPS Take 125 mcg by mouth daily 30 capsule 11    cetirizine (ZYRTEC) 10 MG tablet Take 1 tablet by mouth daily (Patient not taking: Reported on 11/6/2023)      rivaroxaban (XARELTO) 20 MG TABS tablet Take 1 tablet by mouth daily 30 tablet 11    atorvastatin (LIPITOR) 40 MG tablet Take 1 tablet by mouth daily 30 tablet 11    melatonin (RA MELATONIN) 3 MG TABS tablet Take 1 tablet by mouth Daily with supper 30 tablet 11     No current facility-administered

## 2023-11-18 DIAGNOSIS — U07.1 COVID-19 VIRUS INFECTION: Primary | ICD-10-CM

## 2023-12-03 ENCOUNTER — APPOINTMENT (OUTPATIENT)
Dept: CT IMAGING | Age: 58
End: 2023-12-03
Payer: MEDICAID

## 2023-12-03 ENCOUNTER — HOSPITAL ENCOUNTER (EMERGENCY)
Age: 58
Discharge: NURSING, REHAB OR LONG-TERM CARE | End: 2023-12-04
Attending: STUDENT IN AN ORGANIZED HEALTH CARE EDUCATION/TRAINING PROGRAM
Payer: MEDICAID

## 2023-12-03 ENCOUNTER — APPOINTMENT (OUTPATIENT)
Dept: GENERAL RADIOLOGY | Age: 58
End: 2023-12-03
Payer: MEDICAID

## 2023-12-03 DIAGNOSIS — I10 ESSENTIAL HYPERTENSION: Primary | ICD-10-CM

## 2023-12-03 DIAGNOSIS — I15.9 SECONDARY HYPERTENSION: ICD-10-CM

## 2023-12-03 LAB
ALBUMIN SERPL-MCNC: 3.6 GM/DL (ref 3.4–5)
ALP BLD-CCNC: 113 IU/L (ref 40–129)
ALT SERPL-CCNC: 25 U/L (ref 10–40)
ANION GAP SERPL CALCULATED.3IONS-SCNC: 9 MMOL/L (ref 4–16)
AST SERPL-CCNC: 15 IU/L (ref 15–37)
BASOPHILS ABSOLUTE: 0 K/CU MM
BASOPHILS RELATIVE PERCENT: 0.4 % (ref 0–1)
BILIRUB SERPL-MCNC: 0.4 MG/DL (ref 0–1)
BUN SERPL-MCNC: 10 MG/DL (ref 6–23)
CALCIUM SERPL-MCNC: 8.4 MG/DL (ref 8.3–10.6)
CHLORIDE BLD-SCNC: 94 MMOL/L (ref 99–110)
CO2: 27 MMOL/L (ref 21–32)
CREAT SERPL-MCNC: 0.8 MG/DL (ref 0.9–1.3)
DIFFERENTIAL TYPE: ABNORMAL
EOSINOPHILS ABSOLUTE: 0 K/CU MM
EOSINOPHILS RELATIVE PERCENT: 0.2 % (ref 0–3)
GFR SERPL CREATININE-BSD FRML MDRD: >60 ML/MIN/1.73M2
GLUCOSE SERPL-MCNC: 97 MG/DL (ref 70–99)
HCT VFR BLD CALC: 42.9 % (ref 42–52)
HEMOGLOBIN: 14.3 GM/DL (ref 13.5–18)
IMMATURE NEUTROPHIL %: 0.5 % (ref 0–0.43)
LYMPHOCYTES ABSOLUTE: 1.1 K/CU MM
LYMPHOCYTES RELATIVE PERCENT: 14.1 % (ref 24–44)
MCH RBC QN AUTO: 32.7 PG (ref 27–31)
MCHC RBC AUTO-ENTMCNC: 33.3 % (ref 32–36)
MCV RBC AUTO: 98.2 FL (ref 78–100)
MONOCYTES ABSOLUTE: 0.7 K/CU MM
MONOCYTES RELATIVE PERCENT: 8 % (ref 0–4)
NUCLEATED RBC %: 0 %
PDW BLD-RTO: 14.6 % (ref 11.7–14.9)
PLATELET # BLD: 406 K/CU MM (ref 140–440)
PMV BLD AUTO: 9 FL (ref 7.5–11.1)
POTASSIUM SERPL-SCNC: 4.1 MMOL/L (ref 3.5–5.1)
RBC # BLD: 4.37 M/CU MM (ref 4.6–6.2)
SEGMENTED NEUTROPHILS ABSOLUTE COUNT: 6.2 K/CU MM
SEGMENTED NEUTROPHILS RELATIVE PERCENT: 76.8 % (ref 36–66)
SODIUM BLD-SCNC: 130 MMOL/L (ref 135–145)
TOTAL IMMATURE NEUTOROPHIL: 0.04 K/CU MM
TOTAL NUCLEATED RBC: 0 K/CU MM
TOTAL PROTEIN: 7.3 GM/DL (ref 6.4–8.2)
TROPONIN, HIGH SENSITIVITY: 8 NG/L (ref 0–22)
WBC # BLD: 8.1 K/CU MM (ref 4–10.5)

## 2023-12-03 PROCEDURE — 84484 ASSAY OF TROPONIN QUANT: CPT

## 2023-12-03 PROCEDURE — 80053 COMPREHEN METABOLIC PANEL: CPT

## 2023-12-03 PROCEDURE — 85025 COMPLETE CBC W/AUTO DIFF WBC: CPT

## 2023-12-03 PROCEDURE — 6360000002 HC RX W HCPCS: Performed by: STUDENT IN AN ORGANIZED HEALTH CARE EDUCATION/TRAINING PROGRAM

## 2023-12-03 PROCEDURE — 96360 HYDRATION IV INFUSION INIT: CPT

## 2023-12-03 PROCEDURE — 93005 ELECTROCARDIOGRAM TRACING: CPT | Performed by: STUDENT IN AN ORGANIZED HEALTH CARE EDUCATION/TRAINING PROGRAM

## 2023-12-03 PROCEDURE — 96372 THER/PROPH/DIAG INJ SC/IM: CPT

## 2023-12-03 PROCEDURE — 99285 EMERGENCY DEPT VISIT HI MDM: CPT

## 2023-12-03 PROCEDURE — 6370000000 HC RX 637 (ALT 250 FOR IP): Performed by: STUDENT IN AN ORGANIZED HEALTH CARE EDUCATION/TRAINING PROGRAM

## 2023-12-03 PROCEDURE — 2580000003 HC RX 258: Performed by: STUDENT IN AN ORGANIZED HEALTH CARE EDUCATION/TRAINING PROGRAM

## 2023-12-03 PROCEDURE — 71045 X-RAY EXAM CHEST 1 VIEW: CPT

## 2023-12-03 RX ORDER — 0.9 % SODIUM CHLORIDE 0.9 %
1000 INTRAVENOUS SOLUTION INTRAVENOUS ONCE
Status: COMPLETED | OUTPATIENT
Start: 2023-12-03 | End: 2023-12-04

## 2023-12-03 RX ORDER — LORAZEPAM 1 MG/1
2 TABLET ORAL ONCE
Status: COMPLETED | OUTPATIENT
Start: 2023-12-03 | End: 2023-12-03

## 2023-12-03 RX ORDER — ACETAMINOPHEN 500 MG
1000 TABLET ORAL ONCE
Status: COMPLETED | OUTPATIENT
Start: 2023-12-03 | End: 2023-12-03

## 2023-12-03 RX ADMIN — LORAZEPAM 2 MG: 1 TABLET ORAL at 22:47

## 2023-12-03 RX ADMIN — WATER 5 MG: 1 INJECTION INTRAMUSCULAR; INTRAVENOUS; SUBCUTANEOUS at 20:59

## 2023-12-03 RX ADMIN — ACETAMINOPHEN 1000 MG: 500 TABLET ORAL at 21:20

## 2023-12-04 ENCOUNTER — APPOINTMENT (OUTPATIENT)
Dept: CT IMAGING | Age: 58
End: 2023-12-04
Attending: EMERGENCY MEDICINE
Payer: MEDICAID

## 2023-12-04 ENCOUNTER — APPOINTMENT (OUTPATIENT)
Dept: CT IMAGING | Age: 58
End: 2023-12-04
Payer: MEDICAID

## 2023-12-04 ENCOUNTER — HOSPITAL ENCOUNTER (EMERGENCY)
Age: 58
Discharge: HOME | End: 2023-12-04
Attending: EMERGENCY MEDICINE
Payer: MEDICAID

## 2023-12-04 ENCOUNTER — APPOINTMENT (OUTPATIENT)
Dept: GENERAL RADIOLOGY | Age: 58
End: 2023-12-04
Payer: MEDICAID

## 2023-12-04 VITALS
HEART RATE: 68 BPM | TEMPERATURE: 96.5 F | WEIGHT: 158.95 LBS | SYSTOLIC BLOOD PRESSURE: 116 MMHG | DIASTOLIC BLOOD PRESSURE: 102 MMHG | BODY MASS INDEX: 31.21 KG/M2 | OXYGEN SATURATION: 100 % | HEIGHT: 60 IN | RESPIRATION RATE: 20 BRPM

## 2023-12-04 VITALS
TEMPERATURE: 97.9 F | SYSTOLIC BLOOD PRESSURE: 101 MMHG | RESPIRATION RATE: 20 BRPM | DIASTOLIC BLOOD PRESSURE: 60 MMHG | HEIGHT: 60 IN | WEIGHT: 159 LBS | OXYGEN SATURATION: 99 % | HEART RATE: 79 BPM | BODY MASS INDEX: 31.22 KG/M2

## 2023-12-04 DIAGNOSIS — R41.82 ALTERED MENTAL STATUS, UNSPECIFIED ALTERED MENTAL STATUS TYPE: Primary | ICD-10-CM

## 2023-12-04 DIAGNOSIS — J18.9 PNEUMONIA OF LEFT LOWER LOBE DUE TO INFECTIOUS ORGANISM: ICD-10-CM

## 2023-12-04 LAB
ALBUMIN SERPL-MCNC: 4.2 GM/DL (ref 3.4–5)
ALP BLD-CCNC: 119 IU/L (ref 40–129)
ALT SERPL-CCNC: 26 U/L (ref 10–40)
ANION GAP SERPL CALCULATED.3IONS-SCNC: 11 MMOL/L (ref 4–16)
AST SERPL-CCNC: 20 IU/L (ref 15–37)
BASE EXCESS MIXED: 1.2 (ref 0–1.2)
BASE EXCESS MIXED: 3.1 (ref 0–1.2)
BASOPHILS ABSOLUTE: 0 K/CU MM
BASOPHILS RELATIVE PERCENT: 0.5 % (ref 0–1)
BILIRUB SERPL-MCNC: 0.6 MG/DL (ref 0–1)
BILIRUBIN URINE: NEGATIVE MG/DL
BLOOD, URINE: NEGATIVE
BUN SERPL-MCNC: 8 MG/DL (ref 6–23)
CALCIUM SERPL-MCNC: 9.1 MG/DL (ref 8.3–10.6)
CHLORIDE BLD-SCNC: 95 MMOL/L (ref 99–110)
CLARITY: CLEAR
CO2: 27 MMOL/L (ref 21–32)
COLOR: YELLOW
COMMENT UA: NORMAL
COMMENT: ABNORMAL
COMMENT: ABNORMAL
CREAT SERPL-MCNC: 0.7 MG/DL (ref 0.9–1.3)
DIFFERENTIAL TYPE: ABNORMAL
EOSINOPHILS ABSOLUTE: 0 K/CU MM
EOSINOPHILS RELATIVE PERCENT: 0.6 % (ref 0–3)
GFR SERPL CREATININE-BSD FRML MDRD: >60 ML/MIN/1.73M2
GLUCOSE SERPL-MCNC: 95 MG/DL (ref 70–99)
GLUCOSE, URINE: NEGATIVE MG/DL
HCO3 VENOUS: 29 MMOL/L (ref 19–25)
HCO3 VENOUS: 30.2 MMOL/L (ref 19–25)
HCT VFR BLD CALC: 44.4 % (ref 42–52)
HEMOGLOBIN: 14.9 GM/DL (ref 13.5–18)
IMMATURE NEUTROPHIL %: 0.5 % (ref 0–0.43)
KETONES, URINE: NEGATIVE MG/DL
LACTIC ACID, SEPSIS: 1.1 MMOL/L (ref 0.4–2)
LEUKOCYTE ESTERASE, URINE: NEGATIVE
LYMPHOCYTES ABSOLUTE: 1 K/CU MM
LYMPHOCYTES RELATIVE PERCENT: 15.2 % (ref 24–44)
MCH RBC QN AUTO: 32.8 PG (ref 27–31)
MCHC RBC AUTO-ENTMCNC: 33.6 % (ref 32–36)
MCV RBC AUTO: 97.8 FL (ref 78–100)
MONOCYTES ABSOLUTE: 0.5 K/CU MM
MONOCYTES RELATIVE PERCENT: 8.3 % (ref 0–4)
NITRITE URINE, QUANTITATIVE: NEGATIVE
NUCLEATED RBC %: 0 %
O2 SAT, VEN: 60.7 % (ref 50–70)
O2 SAT, VEN: 61.5 % (ref 50–70)
PCO2, VEN: 56 MMHG (ref 38–52)
PCO2, VEN: 59 MMHG (ref 38–52)
PDW BLD-RTO: 14.6 % (ref 11.7–14.9)
PH VENOUS: 7.3 (ref 7.32–7.42)
PH VENOUS: 7.34 (ref 7.32–7.42)
PH, URINE: 6.5 (ref 5–8)
PLATELET # BLD: 386 K/CU MM (ref 140–440)
PMV BLD AUTO: 9.1 FL (ref 7.5–11.1)
PO2, VEN: 38 MMHG (ref 28–48)
PO2, VEN: 39 MMHG (ref 28–48)
POTASSIUM SERPL-SCNC: 4.4 MMOL/L (ref 3.5–5.1)
PROTEIN UA: NEGATIVE MG/DL
RBC # BLD: 4.54 M/CU MM (ref 4.6–6.2)
SEGMENTED NEUTROPHILS ABSOLUTE COUNT: 4.8 K/CU MM
SEGMENTED NEUTROPHILS RELATIVE PERCENT: 74.9 % (ref 36–66)
SODIUM BLD-SCNC: 133 MMOL/L (ref 135–145)
SPECIFIC GRAVITY UA: 1.01 (ref 1–1.03)
TOTAL IMMATURE NEUTOROPHIL: 0.03 K/CU MM
TOTAL NUCLEATED RBC: 0 K/CU MM
TOTAL PROTEIN: 8.2 GM/DL (ref 6.4–8.2)
TROPONIN, HIGH SENSITIVITY: 9 NG/L (ref 0–22)
UROBILINOGEN, URINE: 0.2 MG/DL (ref 0.2–1)
WBC # BLD: 6.4 K/CU MM (ref 4–10.5)

## 2023-12-04 PROCEDURE — 70450 CT HEAD/BRAIN W/O DYE: CPT

## 2023-12-04 PROCEDURE — 2580000003 HC RX 258: Performed by: STUDENT IN AN ORGANIZED HEALTH CARE EDUCATION/TRAINING PROGRAM

## 2023-12-04 PROCEDURE — 83605 ASSAY OF LACTIC ACID: CPT

## 2023-12-04 PROCEDURE — 93005 ELECTROCARDIOGRAM TRACING: CPT | Performed by: EMERGENCY MEDICINE

## 2023-12-04 PROCEDURE — 84484 ASSAY OF TROPONIN QUANT: CPT

## 2023-12-04 PROCEDURE — 96361 HYDRATE IV INFUSION ADD-ON: CPT

## 2023-12-04 PROCEDURE — 82805 BLOOD GASES W/O2 SATURATION: CPT

## 2023-12-04 PROCEDURE — 85025 COMPLETE CBC W/AUTO DIFF WBC: CPT

## 2023-12-04 PROCEDURE — 99285 EMERGENCY DEPT VISIT HI MDM: CPT

## 2023-12-04 PROCEDURE — 6370000000 HC RX 637 (ALT 250 FOR IP): Performed by: EMERGENCY MEDICINE

## 2023-12-04 PROCEDURE — 80053 COMPREHEN METABOLIC PANEL: CPT

## 2023-12-04 PROCEDURE — 71045 X-RAY EXAM CHEST 1 VIEW: CPT

## 2023-12-04 PROCEDURE — 87040 BLOOD CULTURE FOR BACTERIA: CPT

## 2023-12-04 PROCEDURE — 81003 URINALYSIS AUTO W/O SCOPE: CPT

## 2023-12-04 RX ORDER — ACETAMINOPHEN 325 MG/1
650 TABLET ORAL ONCE
Status: COMPLETED | OUTPATIENT
Start: 2023-12-04 | End: 2023-12-04

## 2023-12-04 RX ORDER — SODIUM CHLORIDE 0.9 % (FLUSH) 0.9 %
5-40 SYRINGE (ML) INJECTION EVERY 12 HOURS SCHEDULED
Status: DISCONTINUED | OUTPATIENT
Start: 2023-12-04 | End: 2023-12-04 | Stop reason: HOSPADM

## 2023-12-04 RX ORDER — SODIUM CHLORIDE 0.9 % (FLUSH) 0.9 %
5-40 SYRINGE (ML) INJECTION PRN
Status: DISCONTINUED | OUTPATIENT
Start: 2023-12-04 | End: 2023-12-04 | Stop reason: HOSPADM

## 2023-12-04 RX ORDER — AMOXICILLIN AND CLAVULANATE POTASSIUM 875; 125 MG/1; MG/1
1 TABLET, FILM COATED ORAL ONCE
Status: COMPLETED | OUTPATIENT
Start: 2023-12-04 | End: 2023-12-04

## 2023-12-04 RX ORDER — AMOXICILLIN AND CLAVULANATE POTASSIUM 875; 125 MG/1; MG/1
1 TABLET, FILM COATED ORAL 2 TIMES DAILY
Qty: 14 TABLET | Refills: 0 | Status: SHIPPED | OUTPATIENT
Start: 2023-12-04 | End: 2023-12-11

## 2023-12-04 RX ORDER — SODIUM CHLORIDE 9 MG/ML
INJECTION, SOLUTION INTRAVENOUS PRN
Status: DISCONTINUED | OUTPATIENT
Start: 2023-12-04 | End: 2023-12-04 | Stop reason: HOSPADM

## 2023-12-04 RX ADMIN — SODIUM CHLORIDE 1000 ML: 9 INJECTION, SOLUTION INTRAVENOUS at 01:00

## 2023-12-04 RX ADMIN — ACETAMINOPHEN 650 MG: 325 TABLET ORAL at 15:59

## 2023-12-04 RX ADMIN — AMOXICILLIN AND CLAVULANATE POTASSIUM 1 TABLET: 875; 125 TABLET, FILM COATED ORAL at 17:03

## 2023-12-04 ASSESSMENT — PAIN - FUNCTIONAL ASSESSMENT: PAIN_FUNCTIONAL_ASSESSMENT: WONG-BAKER FACES

## 2023-12-04 ASSESSMENT — PAIN SCALES - WONG BAKER: WONGBAKER_NUMERICALRESPONSE: 0

## 2023-12-04 NOTE — ED NOTES
Medication History  [facility]    Patient Name: Gaby Eric 1965     Medication history has been completed by: Kevin Sandhu CPhT    Source(s) of information: Medication list provided by [facility] and insurance claims     Primary Care Physician: Lucia Quijano MD     Pharmacy: [address] as of 12/04/2023 - Fully Reviewed 12/04/2023   Allergen Reaction Noted    Neosporin [neomycin-bacitracin zn-polymyx]  10/03/2010        Prior to Admission medications    Medication Sig Start Date End Date Taking? Authorizing Provider   famotidine (PEPCID) 20 MG tablet Take 1 tablet by mouth nightly 11/6/23   Lucia Quijano MD   Calcium Polycarbophil (FIBER) 625 MG TABS Take 3 tablets by mouth daily 10/30/23 10/24/24  Lucia Quijano MD   Incontinence Supply Disposable (DEPEND UNDERWEAR SM/MED) MISC 1 Units by Does not apply route 4 times daily 10/10/23   Lucia Quijano MD   levothyroxine (SYNTHROID) 125 MCG tablet Take 1 tablet by mouth daily 6/6/23   Lucia Quijano MD   Cholecalciferol (VITAMIN D) 125 MCG (5000 UT) CAPS  Take 5,000 Units by mouth daily 6/5/23   Lucia Quijano MD   rivaroxaban Giulia Sandhu) 20 MG TABS tablet  Take 1 tablet by mouth daily (with breakfast) 1/31/23   Lucia Quijano MD   atorvastatin (LIPITOR) 40 MG tablet Take 1 tablet by mouth daily 1/31/23   Lucia Quijano MD   melatonin (RA MELATONIN) 3 MG TABS tablet Take 1 tablet by mouth Daily with supper 1/31/23   Lucia Quijano MD     Medications removed from list (include reason, ex. noncompliance, medication cost, therapy complete etc.):   Cetirizine not taking    Comments:  Medication list provided by [facility] and insurance claims verified.   Xarelto therapy updated dose taken this am.    To my knowledge the above medication history is accurate as of 12/4/2023 3:31 PM.   Kevin Sandhu CPhT   12/4/2023 3:31 PM

## 2023-12-04 NOTE — DISCHARGE INSTRUCTIONS
You are seen here today for not acting right potentially having a headache and high blood pressure. Your blood pressure has come down without medications. Your blood work and CAT scan are normal.  Please follow-up with your primary care physician for repeat blood pressure check continue to monitor for symptoms and return for any new worsening or concerning complaints.

## 2023-12-04 NOTE — ED TRIAGE NOTES
Patient to triage with c/o altered mental status. Patients caregivers are at bedside and states patient is non-verbal at baseline. Per caregivers, patient usually walks and is not walking and is usually making noise with his mouth and has been unusually quiet. Resps even and unlabored.

## 2023-12-04 NOTE — ED PROVIDER NOTES
Emergency Department Encounter        Pt Name: Natty Tafoya  MRN: [id number]  [address] 1965  Date of evaluation: 12/3/2023  ED Physician: Nitesh Desir MD    CHIEF COMPLAINT     Triage Chief Complaint:   Hypertension (Patient resides in a group home and the aid reported patients positioning (how he was holding his head) holding his arm up to the left side of his head as if maybe in pain. Blood pressure was assessed and found to be 186/143 and the patient was sent to this facility for medical treatment. Patient is non-verbal but follows commands when asked questions.)      HISTORY OF PRESENT ILLNESS & REVIEW OF SYSTEMS     History obtained from patient and staff. Natty Tafoya is a 59 y.o. male who presents to the emergency department for evaluation of high blood pressure. Patient nonverbal but able to respond by shaking yes and no. Reportedly he was at a group home and patient was position where he was holding his head and they were concerned that he might be having some pain. Said that blood pressure at that time was 186/143. When asked if he is having pain he shakes his head yes when I point and asked on having pain he shakes his head yes, when pointing to his head. Also shakes his head yes when pointing to the blanket on the bed and the railing. The patient has no other acute complaints at this time. Review of systems as above.           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
Neutrophil % 0.5 (H) 0 - 0.43 %   CMP   Result Value Ref Range    Sodium 130 (L) 135 - 145 MMOL/L    Potassium 4.1 3.5 - 5.1 MMOL/L    Chloride 94 (L) 99 - 110 mMol/L    CO2 27 21 - 32 MMOL/L    Anion Gap 9 4 - 16    Glucose 97 70 - 99 MG/DL    BUN 10 6 - 23 MG/DL    Creatinine 0.8 (L) 0.9 - 1.3 MG/DL    Est, Glom Filt Rate >60 >60 mL/min/1.73m2    Calcium 8.4 8.3 - 10.6 MG/DL    Total Protein 7.3 6.4 - 8.2 GM/DL    Albumin 3.6 3.4 - 5.0 GM/DL    Total Bilirubin 0.4 0.0 - 1.0 MG/DL    Alkaline Phosphatase 113 40 - 129 IU/L    ALT 25 10 - 40 U/L    AST 15 15 - 37 IU/L   Troponin   Result Value Ref Range    Troponin, High Sensitivity 8 0 - 22 ng/L   Troponin   Result Value Ref Range    Troponin, High Sensitivity 9 0 - 22 ng/L   EKG 12 Lead   Result Value Ref Range    Ventricular Rate 83 BPM    Atrial Rate 83 BPM    P-R Interval 180 ms    QRS Duration 74 ms    Q-T Interval 396 ms    QTc Calculation (Bazett) 465 ms    P Axis 54 degrees    R Axis 26 degrees    T Axis 29 degrees    Diagnosis       Normal sinus rhythm  Normal ECG  No previous ECGs available       CT Head W/O Contrast    Result Date: 12/4/2023  EXAMINATION: CT OF THE HEAD WITHOUT CONTRAST  12/4/2023 12:26 am TECHNIQUE: CT of the head was performed without the administration of intravenous contrast. Automated exposure control, iterative reconstruction, and/or weight based adjustment of the mA/kV was utilized to reduce the radiation dose to as low as reasonably achievable. COMPARISON: None. HISTORY: ORDERING SYSTEM PROVIDED HISTORY: hypertension, nonverbal TECHNOLOGIST PROVIDED HISTORY: Reason for exam:->hypertension, nonverbal Has a \"code stroke\" or \"stroke alert\" been called? ->No Decision Support Exception - unselect if not a suspected or confirmed emergency medical condition->Emergency Medical Condition (MA) Reason for Exam: hypertension, nonverbal FINDINGS: BRAIN/VENTRICLES: There is no acute intracranial hemorrhage, mass effect or midline shift.   No

## 2023-12-04 NOTE — ED PROVIDER NOTES
care: Nonverbal    Records Reviewed : Other Per ED note from last night   appears was seen by PCP Dr. Terryl Siemens on 11/17/2023 for cough and nasal congestion and fevers. They had noted he was positive for COVID-19      Disposition Considerations (tests considered but not done, Shared Decision Making, Pt Expectation of Test or Tx.):   I do suspect that his altered mental status was from the medications that he had to receive in order to have the CTA done overnight, he is continuing to improve here   actually up and ambulatory and back at his baseline, appears to maybe have a left lower lobe pneumonia and we will treat with Augmentin. His vitals are stable. His initial pH was 7.3 and on recheck with him up and walking around it is normal.  He did have COVID-19 a couple of weeks ago, we will treat for possible pneumonia with Augmentin at this time. He is ambulatory and not in distress with no significant electrolyte derangements or other findings on labs, I do feel that he would be appropriate for outpatient management and the caregiver with him feels very comfortable with this. Given strict return precautions. ED Course as of 12/04/23 1828   Mon Dec 04, 2023   1542 Patient patient improving, up with caregiver to walk to the bathroom, more at baseline [KA]      ED Course User Index  Марина Uribe MD           Discharge condition: stable    I am the Primary Clinician of Record. Clinical Impression:  1. Altered mental status, unspecified altered mental status type    2.  Pneumonia of left lower lobe due to infectious organism      Disposition referral (if applicable):  Olga Patiño MD  [address] [facility]  [phone]          Disposition medications (if applicable):  Discharge Medication List as of 12/4/2023  4:48 PM        START taking these medications    Details   amoxicillin-clavulanate (AUGMENTIN) 875-125 MG per tablet Take 1 tablet by mouth 2 times daily for 7 days,

## 2023-12-05 ENCOUNTER — OFFICE VISIT (OUTPATIENT)
Dept: FAMILY MEDICINE CLINIC | Age: 58
End: 2023-12-05
Payer: MEDICAID

## 2023-12-05 VITALS
TEMPERATURE: 97.2 F | BODY MASS INDEX: 30.39 KG/M2 | SYSTOLIC BLOOD PRESSURE: 138 MMHG | DIASTOLIC BLOOD PRESSURE: 82 MMHG | WEIGHT: 155.6 LBS | HEART RATE: 84 BPM

## 2023-12-05 DIAGNOSIS — Z86.711 HISTORY OF PULMONARY EMBOLISM: ICD-10-CM

## 2023-12-05 DIAGNOSIS — Z86.73 HISTORY OF CVA IN ADULTHOOD: ICD-10-CM

## 2023-12-05 DIAGNOSIS — J18.9 COMMUNITY ACQUIRED PNEUMONIA, UNSPECIFIED LATERALITY: Primary | ICD-10-CM

## 2023-12-05 DIAGNOSIS — F79 INTELLECTUAL DISABILITY: ICD-10-CM

## 2023-12-05 DIAGNOSIS — K21.9 GASTROESOPHAGEAL REFLUX DISEASE, UNSPECIFIED WHETHER ESOPHAGITIS PRESENT: ICD-10-CM

## 2023-12-05 DIAGNOSIS — G47.00 INSOMNIA, UNSPECIFIED TYPE: ICD-10-CM

## 2023-12-05 DIAGNOSIS — F72 SEVERE INTELLECTUAL DISABILITY WITH INTELLIGENCE QUOTIENT 20 TO 34: ICD-10-CM

## 2023-12-05 LAB
EKG ATRIAL RATE: 68 BPM
EKG ATRIAL RATE: 83 BPM
EKG DIAGNOSIS: NORMAL
EKG DIAGNOSIS: NORMAL
EKG P AXIS: 42 DEGREES
EKG P AXIS: 54 DEGREES
EKG P-R INTERVAL: 168 MS
EKG P-R INTERVAL: 180 MS
EKG Q-T INTERVAL: 396 MS
EKG Q-T INTERVAL: 416 MS
EKG QRS DURATION: 74 MS
EKG QRS DURATION: 76 MS
EKG QTC CALCULATION (BAZETT): 442 MS
EKG QTC CALCULATION (BAZETT): 465 MS
EKG R AXIS: -3 DEGREES
EKG R AXIS: 26 DEGREES
EKG T AXIS: 12 DEGREES
EKG T AXIS: 29 DEGREES
EKG VENTRICULAR RATE: 68 BPM
EKG VENTRICULAR RATE: 83 BPM

## 2023-12-05 PROCEDURE — 93010 ELECTROCARDIOGRAM REPORT: CPT | Performed by: INTERNAL MEDICINE

## 2023-12-05 PROCEDURE — 99214 OFFICE O/P EST MOD 30 MIN: CPT | Performed by: FAMILY MEDICINE

## 2023-12-05 RX ORDER — ATORVASTATIN CALCIUM 40 MG/1
40 TABLET, FILM COATED ORAL DAILY
Qty: 30 TABLET | Refills: 11 | Status: SHIPPED | OUTPATIENT
Start: 2023-12-05

## 2023-12-05 RX ORDER — FAMOTIDINE 20 MG/1
20 TABLET, FILM COATED ORAL 2 TIMES DAILY
Qty: 60 TABLET | Refills: 5 | Status: SHIPPED | OUTPATIENT
Start: 2023-12-05

## 2023-12-05 RX ORDER — LANOLIN ALCOHOL/MO/W.PET/CERES
3 CREAM (GRAM) TOPICAL
Qty: 30 TABLET | Refills: 11 | Status: SHIPPED | OUTPATIENT
Start: 2023-12-05

## 2023-12-05 ASSESSMENT — PATIENT HEALTH QUESTIONNAIRE - PHQ9: DEPRESSION UNABLE TO ASSESS: PT REFUSES

## 2023-12-05 NOTE — PROGRESS NOTES
left lung base which may represent pneumonia. Assessment:       Diagnosis Orders   1. Community acquired pneumonia, unspecified laterality        2. History of CVA in adulthood  atorvastatin (LIPITOR) 40 MG tablet      3. Gastroesophageal reflux disease, unspecified whether esophagitis present  famotidine (PEPCID) 20 MG tablet      4. Insomnia, unspecified type  melatonin (RA MELATONIN) 3 MG TABS tablet      5. History of pulmonary embolism  rivaroxaban (XARELTO) 20 MG TABS tablet      6. Intellectual disability        7. Down's Syndrome        8. Lives in group home                Plan:      GERD symptoms worsening. Pepcid increase to twice per day    Continue melatonin at night    Continue melatonin at night    Return in about 3 months (around 3/5/2024) for MRDD.

## 2023-12-05 NOTE — PATIENT INSTRUCTIONS
BRING YOUR MEDICATION BOTTLES TO ALL APPOINTMENTS    Check MY CHART for test results. If you have forgotten your password, call [phone]. The diagnoses and medications listed in this after visit summary may not be up to date. Check MY CHART in 28-48 hours for corrections. Late cancellation policy: So that we can better accommodate people who are sick, please give our office 24 hour notice for an appointment cancellation. Missed appointments: Your care is very important to us. It is important that you keep your scheduled appointments. Multiple missed appointments will lead to a dismissal from the office. Patients arriving late will be worked into the schedule as time permits, with patients arriving on time taken as scheduled. Late arriving patients are more than welcome to wait or reschedule their appointments. Please allow 5-7 business days for paperwork to be processed.

## 2023-12-09 LAB
CULTURE: NORMAL
Lab: NORMAL
SPECIMEN: NORMAL

## 2023-12-20 PROBLEM — Q90.9 DOWN SYNDROME: Status: ACTIVE | Noted: 2023-12-20

## 2024-01-01 ENCOUNTER — TELEPHONE (OUTPATIENT)
Dept: FAMILY MEDICINE CLINIC | Age: 59
End: 2024-01-01

## 2024-01-01 DIAGNOSIS — Z86.73 HISTORY OF CVA IN ADULTHOOD: ICD-10-CM

## 2024-01-01 RX ORDER — ATORVASTATIN CALCIUM 40 MG/1
40 TABLET, FILM COATED ORAL DAILY
Qty: 29 TABLET | Refills: 11 | Status: CANCELLED | OUTPATIENT
Start: 2024-01-01

## 2024-01-17 ENCOUNTER — HOSPITAL ENCOUNTER (INPATIENT)
Age: 59
LOS: 4 days | Discharge: NURSING, REHAB OR LONG-TERM CARE | DRG: 133 | End: 2024-01-22
Attending: STUDENT IN AN ORGANIZED HEALTH CARE EDUCATION/TRAINING PROGRAM | Admitting: STUDENT IN AN ORGANIZED HEALTH CARE EDUCATION/TRAINING PROGRAM
Payer: MEDICAID

## 2024-01-17 ENCOUNTER — APPOINTMENT (OUTPATIENT)
Dept: GENERAL RADIOLOGY | Age: 59
DRG: 133 | End: 2024-01-17
Payer: MEDICAID

## 2024-01-17 ENCOUNTER — APPOINTMENT (OUTPATIENT)
Dept: CT IMAGING | Age: 59
DRG: 133 | End: 2024-01-17
Payer: MEDICAID

## 2024-01-17 DIAGNOSIS — R09.02 HYPOXEMIA: ICD-10-CM

## 2024-01-17 DIAGNOSIS — R79.89 ELEVATED TROPONIN: ICD-10-CM

## 2024-01-17 DIAGNOSIS — J18.9 PNEUMONIA OF LEFT LOWER LOBE DUE TO INFECTIOUS ORGANISM: Primary | ICD-10-CM

## 2024-01-17 DIAGNOSIS — K56.41 FECAL IMPACTION (HCC): ICD-10-CM

## 2024-01-17 LAB
ALBUMIN SERPL-MCNC: 3 GM/DL (ref 3.4–5)
ALP BLD-CCNC: 83 IU/L (ref 40–129)
ALT SERPL-CCNC: 26 U/L (ref 10–40)
ANION GAP SERPL CALCULATED.3IONS-SCNC: 12 MMOL/L (ref 7–16)
AST SERPL-CCNC: 17 IU/L (ref 15–37)
B PARAP IS1001 DNA NPH QL NAA+NON-PROBE: NOT DETECTED
B PERT.PT PRMT NPH QL NAA+NON-PROBE: NOT DETECTED
BASE EXCESS MIXED: 2.5 (ref 0–3)
BASOPHILS ABSOLUTE: 0 K/CU MM
BASOPHILS RELATIVE PERCENT: 0.3 % (ref 0–1)
BILIRUB SERPL-MCNC: 0.6 MG/DL (ref 0–1)
BILIRUBIN URINE: NEGATIVE MG/DL
BLOOD, URINE: ABNORMAL
BUN SERPL-MCNC: 12 MG/DL (ref 6–23)
C PNEUM DNA NPH QL NAA+NON-PROBE: NOT DETECTED
CALCIUM SERPL-MCNC: 8.6 MG/DL (ref 8.3–10.6)
CHLORIDE BLD-SCNC: 94 MMOL/L (ref 99–110)
CLARITY: CLEAR
CO2: 26 MMOL/L (ref 21–32)
COLOR: YELLOW
COMMENT: ABNORMAL
CREAT SERPL-MCNC: 0.8 MG/DL (ref 0.9–1.3)
DIFFERENTIAL TYPE: ABNORMAL
EOSINOPHILS ABSOLUTE: 0 K/CU MM
EOSINOPHILS RELATIVE PERCENT: 0.1 % (ref 0–3)
FLUAV H1 2009 PAN RNA NPH NAA+NON-PROBE: NOT DETECTED
FLUAV H1 RNA NPH QL NAA+NON-PROBE: NOT DETECTED
FLUAV H3 RNA NPH QL NAA+NON-PROBE: NOT DETECTED
FLUAV RNA NPH QL NAA+NON-PROBE: NOT DETECTED
FLUBV RNA NPH QL NAA+NON-PROBE: NOT DETECTED
GFR SERPL CREATININE-BSD FRML MDRD: >60 ML/MIN/1.73M2
GLUCOSE SERPL-MCNC: 124 MG/DL (ref 70–99)
GLUCOSE, URINE: NEGATIVE MG/DL
HADV DNA NPH QL NAA+NON-PROBE: NOT DETECTED
HCO3 VENOUS: 28.4 MMOL/L (ref 22–29)
HCOV 229E RNA NPH QL NAA+NON-PROBE: NOT DETECTED
HCOV HKU1 RNA NPH QL NAA+NON-PROBE: NOT DETECTED
HCOV NL63 RNA NPH QL NAA+NON-PROBE: NOT DETECTED
HCOV OC43 RNA NPH QL NAA+NON-PROBE: NOT DETECTED
HCT VFR BLD CALC: 36.2 % (ref 42–52)
HEMOGLOBIN: 12.2 GM/DL (ref 13.5–18)
HMPV RNA NPH QL NAA+NON-PROBE: NOT DETECTED
HPIV1 RNA NPH QL NAA+NON-PROBE: NOT DETECTED
HPIV2 RNA NPH QL NAA+NON-PROBE: NOT DETECTED
HPIV3 RNA NPH QL NAA+NON-PROBE: NOT DETECTED
HPIV4 RNA NPH QL NAA+NON-PROBE: NOT DETECTED
IMMATURE NEUTROPHIL %: 0.5 % (ref 0–0.43)
KETONES, URINE: NEGATIVE MG/DL
LACTATE: 1.6 MMOL/L (ref 0.5–1.9)
LEUKOCYTE ESTERASE, URINE: NEGATIVE
LIPASE: 14 IU/L (ref 13–60)
LYMPHOCYTES ABSOLUTE: 0.5 K/CU MM
LYMPHOCYTES RELATIVE PERCENT: 4.2 % (ref 24–44)
M PNEUMO DNA NPH QL NAA+NON-PROBE: NOT DETECTED
MAGNESIUM: 2.3 MG/DL (ref 1.8–2.4)
MCH RBC QN AUTO: 31.9 PG (ref 27–31)
MCHC RBC AUTO-ENTMCNC: 33.7 % (ref 32–36)
MCV RBC AUTO: 94.5 FL (ref 78–100)
MONOCYTES ABSOLUTE: 0.8 K/CU MM
MONOCYTES RELATIVE PERCENT: 6.7 % (ref 0–4)
NITRITE URINE, QUANTITATIVE: NEGATIVE
NUCLEATED RBC %: 0 %
O2 SAT, VEN: 79.2 % (ref 50–70)
PCO2, VEN: 48 MMHG (ref 41–51)
PDW BLD-RTO: 14.8 % (ref 11.7–14.9)
PH VENOUS: 7.38 (ref 7.32–7.43)
PH, URINE: 7 (ref 5–8)
PLATELET # BLD: 533 K/CU MM (ref 140–440)
PMV BLD AUTO: 8.9 FL (ref 7.5–11.1)
PO2, VEN: 50 MMHG (ref 28–48)
POTASSIUM SERPL-SCNC: 4.1 MMOL/L (ref 3.5–5.1)
PROTEIN UA: NEGATIVE MG/DL
RBC # BLD: 3.83 M/CU MM (ref 4.6–6.2)
RSV RNA NPH QL NAA+NON-PROBE: NOT DETECTED
RV+EV RNA NPH QL NAA+NON-PROBE: NOT DETECTED
SARS-COV-2 RNA NPH QL NAA+NON-PROBE: NOT DETECTED
SEGMENTED NEUTROPHILS ABSOLUTE COUNT: 9.9 K/CU MM
SEGMENTED NEUTROPHILS RELATIVE PERCENT: 88.2 % (ref 36–66)
SODIUM BLD-SCNC: 132 MMOL/L (ref 135–145)
SPECIFIC GRAVITY UA: 1.01 (ref 1–1.03)
TOTAL IMMATURE NEUTOROPHIL: 0.06 K/CU MM
TOTAL NUCLEATED RBC: 0 K/CU MM
TOTAL PROTEIN: 7.2 GM/DL (ref 6.4–8.2)
TROPONIN, HIGH SENSITIVITY: 23 NG/L (ref 0–22)
TROPONIN, HIGH SENSITIVITY: 24 NG/L (ref 0–22)
UROBILINOGEN, URINE: 1 MG/DL (ref 0.2–1)
WBC # BLD: 11.3 K/CU MM (ref 4–10.5)

## 2024-01-17 PROCEDURE — 96365 THER/PROPH/DIAG IV INF INIT: CPT

## 2024-01-17 PROCEDURE — 0202U NFCT DS 22 TRGT SARS-COV-2: CPT

## 2024-01-17 PROCEDURE — 51798 US URINE CAPACITY MEASURE: CPT

## 2024-01-17 PROCEDURE — 81001 URINALYSIS AUTO W/SCOPE: CPT

## 2024-01-17 PROCEDURE — 93005 ELECTROCARDIOGRAM TRACING: CPT | Performed by: PHYSICIAN ASSISTANT

## 2024-01-17 PROCEDURE — 94640 AIRWAY INHALATION TREATMENT: CPT

## 2024-01-17 PROCEDURE — 6370000000 HC RX 637 (ALT 250 FOR IP): Performed by: PHYSICIAN ASSISTANT

## 2024-01-17 PROCEDURE — 83735 ASSAY OF MAGNESIUM: CPT

## 2024-01-17 PROCEDURE — 71260 CT THORAX DX C+: CPT

## 2024-01-17 PROCEDURE — 84484 ASSAY OF TROPONIN QUANT: CPT

## 2024-01-17 PROCEDURE — 6360000004 HC RX CONTRAST MEDICATION: Performed by: PHYSICIAN ASSISTANT

## 2024-01-17 PROCEDURE — 96368 THER/DIAG CONCURRENT INF: CPT

## 2024-01-17 PROCEDURE — 83605 ASSAY OF LACTIC ACID: CPT

## 2024-01-17 PROCEDURE — 83690 ASSAY OF LIPASE: CPT

## 2024-01-17 PROCEDURE — 87040 BLOOD CULTURE FOR BACTERIA: CPT

## 2024-01-17 PROCEDURE — 82805 BLOOD GASES W/O2 SATURATION: CPT

## 2024-01-17 PROCEDURE — 70450 CT HEAD/BRAIN W/O DYE: CPT

## 2024-01-17 PROCEDURE — 6360000002 HC RX W HCPCS: Performed by: PHYSICIAN ASSISTANT

## 2024-01-17 PROCEDURE — 99285 EMERGENCY DEPT VISIT HI MDM: CPT

## 2024-01-17 PROCEDURE — 71045 X-RAY EXAM CHEST 1 VIEW: CPT

## 2024-01-17 PROCEDURE — 2580000003 HC RX 258: Performed by: PHYSICIAN ASSISTANT

## 2024-01-17 PROCEDURE — 96375 TX/PRO/DX INJ NEW DRUG ADDON: CPT

## 2024-01-17 PROCEDURE — 96366 THER/PROPH/DIAG IV INF ADDON: CPT

## 2024-01-17 PROCEDURE — 85025 COMPLETE CBC W/AUTO DIFF WBC: CPT

## 2024-01-17 PROCEDURE — 80053 COMPREHEN METABOLIC PANEL: CPT

## 2024-01-17 RX ORDER — IPRATROPIUM BROMIDE AND ALBUTEROL SULFATE 2.5; .5 MG/3ML; MG/3ML
1 SOLUTION RESPIRATORY (INHALATION) ONCE
Status: COMPLETED | OUTPATIENT
Start: 2024-01-17 | End: 2024-01-17

## 2024-01-17 RX ORDER — SODIUM CHLORIDE 0.9 % (FLUSH) 0.9 %
5-40 SYRINGE (ML) INJECTION 2 TIMES DAILY
Status: DISCONTINUED | OUTPATIENT
Start: 2024-01-17 | End: 2024-01-18

## 2024-01-17 RX ADMIN — METHYLPREDNISOLONE SODIUM SUCCINATE 125 MG: 125 INJECTION, POWDER, FOR SOLUTION INTRAMUSCULAR; INTRAVENOUS at 22:40

## 2024-01-17 RX ADMIN — IPRATROPIUM BROMIDE AND ALBUTEROL SULFATE 1 DOSE: 2.5; .5 SOLUTION RESPIRATORY (INHALATION) at 21:05

## 2024-01-17 RX ADMIN — IOPAMIDOL 75 ML: 755 INJECTION, SOLUTION INTRAVENOUS at 21:48

## 2024-01-17 RX ADMIN — AZITHROMYCIN MONOHYDRATE 500 MG: 500 INJECTION, POWDER, LYOPHILIZED, FOR SOLUTION INTRAVENOUS at 19:54

## 2024-01-17 RX ADMIN — CEFTRIAXONE 1000 MG: 1 INJECTION, POWDER, FOR SOLUTION INTRAMUSCULAR; INTRAVENOUS at 19:48

## 2024-01-17 NOTE — ED PROVIDER NOTES
EMERGENCY DEPARTMENT ENCOUNTER        Pt Name: Saravanan Cooley  MRN: [id number]  Birthdate 1965  Date of evaluation: 1/17/2024  Provider: Yulisa Chin PA-C  PCP: Yohana Johnson MD    LIMA. I have evaluated this patient.        Triage CHIEF COMPLAINT       Chief Complaint   Patient presents with    Fatigue     Patient is from group home. Patient is MRDD usually gets up and goes to work but patient is fatigued and not wanting to get up as normal    Cough         HISTORY OF PRESENT ILLNESS      Chief Complaint: Fatigue, weakness, congestion    Saravanan Cooley is a 58 y.o. male who presents for fatigue, weakness, congestion.  Patient is MRDD and non-verbal.  He is accompanied by a caretaker from his group home who provides history.  He states patient was able to go to his day program yesterday just fine.  However, yesterday evening, he sat himself down on the floor and was unable to get up--which is unusual for him.  Today, they were unable to get him out of bed all day and he did not eat.  He sounds congested and seems to be uncomfortable when they sit him upright.  No known fever.  No vomiting or diarrhea.  He has been around a lot of sick contacts at his day program.      Nursing Notes were all reviewed and agreed with or any disagreements were addressed in the HPI.    REVIEW OF SYSTEMS     CONSTITUTIONAL:  Denies fever.  + lethargy/weakness.  EYES:  Denies visual changes.  HEAD:  Denies headache.  ENT:  Denies earache, nasal congestion, sore throat.  NECK:  Denies neck pain.  RESPIRATORY:  + congestion.  CARDIOVASCULAR:  Denies chest pain.  GI:  Denies nausea or vomiting.    :  Denies urinary symptoms.  MUSCULOSKELETAL:  Denies extremity pain or swelling.  BACK:  Denies back pain.  INTEGUMENT:  Denies skin changes.  LYMPHATIC:  Denies lymphadenopathy.  NEUROLOGIC:  Denies any numbness/tingling.    PAST MEDICAL HISTORY     Past Medical History:   Diagnosis Date    Chronic anticoagulation 12/2012

## 2024-01-18 ENCOUNTER — APPOINTMENT (OUTPATIENT)
Dept: GENERAL RADIOLOGY | Age: 59
DRG: 133 | End: 2024-01-18
Payer: MEDICAID

## 2024-01-18 ENCOUNTER — APPOINTMENT (OUTPATIENT)
Dept: GENERAL RADIOLOGY | Age: 59
DRG: 133 | End: 2024-01-18
Attending: SPECIALIST
Payer: MEDICAID

## 2024-01-18 PROBLEM — J18.9 PNEUMONIA DUE TO ORGANISM: Status: ACTIVE | Noted: 2024-01-18

## 2024-01-18 LAB
ALBUMIN SERPL-MCNC: 2.8 GM/DL (ref 3.4–5)
ALP BLD-CCNC: 77 IU/L (ref 40–129)
ALT SERPL-CCNC: 25 U/L (ref 10–40)
AMMONIA: 25 UMOL/L (ref 16–60)
ANION GAP SERPL CALCULATED.3IONS-SCNC: 13 MMOL/L (ref 7–16)
AST SERPL-CCNC: 14 IU/L (ref 15–37)
BACTERIA: NEGATIVE /HPF
BASE EXCESS MIXED: 1.3 (ref 0–3)
BASOPHILS ABSOLUTE: 0 K/CU MM
BASOPHILS RELATIVE PERCENT: 0.1 % (ref 0–1)
BILIRUB SERPL-MCNC: 0.5 MG/DL (ref 0–1)
BUN SERPL-MCNC: 19 MG/DL (ref 6–23)
CALCIUM SERPL-MCNC: 8.8 MG/DL (ref 8.3–10.6)
CHLORIDE BLD-SCNC: 95 MMOL/L (ref 99–110)
CO2: 25 MMOL/L (ref 21–32)
COMMENT: ABNORMAL
CREAT SERPL-MCNC: 0.9 MG/DL (ref 0.9–1.3)
DIFFERENTIAL TYPE: ABNORMAL
EOSINOPHILS ABSOLUTE: 0 K/CU MM
EOSINOPHILS RELATIVE PERCENT: 0 % (ref 0–3)
GFR SERPL CREATININE-BSD FRML MDRD: >60 ML/MIN/1.73M2
GLUCOSE BLD-MCNC: 178 MG/DL (ref 70–99)
GLUCOSE SERPL-MCNC: 147 MG/DL (ref 70–99)
HCO3 VENOUS: 27.2 MMOL/L (ref 22–29)
HCT VFR BLD CALC: 36.1 % (ref 42–52)
HEMOGLOBIN: 12.4 GM/DL (ref 13.5–18)
IMMATURE NEUTROPHIL %: 0.5 % (ref 0–0.43)
LYMPHOCYTES ABSOLUTE: 0.4 K/CU MM
LYMPHOCYTES RELATIVE PERCENT: 3.5 % (ref 24–44)
MCH RBC QN AUTO: 32.1 PG (ref 27–31)
MCHC RBC AUTO-ENTMCNC: 34.3 % (ref 32–36)
MCV RBC AUTO: 93.5 FL (ref 78–100)
MONOCYTES ABSOLUTE: 0.4 K/CU MM
MONOCYTES RELATIVE PERCENT: 3.9 % (ref 0–4)
MUCUS: ABNORMAL HPF
NUCLEATED RBC %: 0 %
O2 SAT, VEN: 94.6 % (ref 50–70)
PCO2, VEN: 47 MMHG (ref 41–51)
PDW BLD-RTO: 15 % (ref 11.7–14.9)
PH VENOUS: 7.37 (ref 7.32–7.43)
PLATELET # BLD: 543 K/CU MM (ref 140–440)
PMV BLD AUTO: 8.9 FL (ref 7.5–11.1)
PO2, VEN: 101 MMHG (ref 28–48)
POTASSIUM SERPL-SCNC: 4.2 MMOL/L (ref 3.5–5.1)
PRO-BNP: 196 PG/ML
RBC # BLD: 3.86 M/CU MM (ref 4.6–6.2)
RBC URINE: 22 /HPF (ref 0–3)
SEGMENTED NEUTROPHILS ABSOLUTE COUNT: 9.8 K/CU MM
SEGMENTED NEUTROPHILS RELATIVE PERCENT: 92 % (ref 36–66)
SODIUM BLD-SCNC: 133 MMOL/L (ref 135–145)
TOTAL IMMATURE NEUTOROPHIL: 0.05 K/CU MM
TOTAL NUCLEATED RBC: 0 K/CU MM
TOTAL PROTEIN: 7 GM/DL (ref 6.4–8.2)
TRICHOMONAS: ABNORMAL /HPF
WBC # BLD: 10.6 K/CU MM (ref 4–10.5)
WBC UA: 1 /HPF (ref 0–2)

## 2024-01-18 PROCEDURE — 82962 GLUCOSE BLOOD TEST: CPT

## 2024-01-18 PROCEDURE — 80053 COMPREHEN METABOLIC PANEL: CPT

## 2024-01-18 PROCEDURE — 2580000003 HC RX 258: Performed by: STUDENT IN AN ORGANIZED HEALTH CARE EDUCATION/TRAINING PROGRAM

## 2024-01-18 PROCEDURE — 5A1945Z RESPIRATORY VENTILATION, 24-96 CONSECUTIVE HOURS: ICD-10-PCS | Performed by: STUDENT IN AN ORGANIZED HEALTH CARE EDUCATION/TRAINING PROGRAM

## 2024-01-18 PROCEDURE — 85025 COMPLETE CBC W/AUTO DIFF WBC: CPT

## 2024-01-18 PROCEDURE — 92610 EVALUATE SWALLOWING FUNCTION: CPT

## 2024-01-18 PROCEDURE — 6360000002 HC RX W HCPCS: Performed by: STUDENT IN AN ORGANIZED HEALTH CARE EDUCATION/TRAINING PROGRAM

## 2024-01-18 PROCEDURE — 74018 RADEX ABDOMEN 1 VIEW: CPT

## 2024-01-18 PROCEDURE — 31500 INSERT EMERGENCY AIRWAY: CPT

## 2024-01-18 PROCEDURE — 94002 VENT MGMT INPAT INIT DAY: CPT

## 2024-01-18 PROCEDURE — 82140 ASSAY OF AMMONIA: CPT

## 2024-01-18 PROCEDURE — 6370000000 HC RX 637 (ALT 250 FOR IP): Performed by: STUDENT IN AN ORGANIZED HEALTH CARE EDUCATION/TRAINING PROGRAM

## 2024-01-18 PROCEDURE — 93005 ELECTROCARDIOGRAM TRACING: CPT | Performed by: EMERGENCY MEDICINE

## 2024-01-18 PROCEDURE — 6360000002 HC RX W HCPCS

## 2024-01-18 PROCEDURE — 83880 ASSAY OF NATRIURETIC PEPTIDE: CPT

## 2024-01-18 PROCEDURE — 2500000003 HC RX 250 WO HCPCS: Performed by: EMERGENCY MEDICINE

## 2024-01-18 PROCEDURE — 89220 SPUTUM SPECIMEN COLLECTION: CPT

## 2024-01-18 PROCEDURE — 0BH17EZ INSERTION OF ENDOTRACHEAL AIRWAY INTO TRACHEA, VIA NATURAL OR ARTIFICIAL OPENING: ICD-10-PCS | Performed by: STUDENT IN AN ORGANIZED HEALTH CARE EDUCATION/TRAINING PROGRAM

## 2024-01-18 PROCEDURE — 0B938ZZ DRAINAGE OF RIGHT MAIN BRONCHUS, VIA NATURAL OR ARTIFICIAL OPENING ENDOSCOPIC: ICD-10-PCS | Performed by: STUDENT IN AN ORGANIZED HEALTH CARE EDUCATION/TRAINING PROGRAM

## 2024-01-18 PROCEDURE — 93005 ELECTROCARDIOGRAM TRACING: CPT | Performed by: STUDENT IN AN ORGANIZED HEALTH CARE EDUCATION/TRAINING PROGRAM

## 2024-01-18 PROCEDURE — 2000000000 HC ICU R&B

## 2024-01-18 PROCEDURE — 2700000000 HC OXYGEN THERAPY PER DAY

## 2024-01-18 PROCEDURE — 6370000000 HC RX 637 (ALT 250 FOR IP): Performed by: NURSE PRACTITIONER

## 2024-01-18 PROCEDURE — 82805 BLOOD GASES W/O2 SATURATION: CPT

## 2024-01-18 PROCEDURE — 2500000003 HC RX 250 WO HCPCS

## 2024-01-18 PROCEDURE — 2500000003 HC RX 250 WO HCPCS: Performed by: NURSE PRACTITIONER

## 2024-01-18 PROCEDURE — 87449 NOS EACH ORGANISM AG IA: CPT

## 2024-01-18 PROCEDURE — 94761 N-INVAS EAR/PLS OXIMETRY MLT: CPT

## 2024-01-18 PROCEDURE — 71045 X-RAY EXAM CHEST 1 VIEW: CPT

## 2024-01-18 RX ORDER — MIDAZOLAM HYDROCHLORIDE 2 MG/2ML
2 INJECTION, SOLUTION INTRAMUSCULAR; INTRAVENOUS
Status: DISCONTINUED | OUTPATIENT
Start: 2024-01-18 | End: 2024-01-18

## 2024-01-18 RX ORDER — DOCUSATE SODIUM 100 MG/1
100 CAPSULE, LIQUID FILLED ORAL 2 TIMES DAILY
Status: DISCONTINUED | OUTPATIENT
Start: 2024-01-18 | End: 2024-01-19

## 2024-01-18 RX ORDER — CHLORHEXIDINE GLUCONATE ORAL RINSE 1.2 MG/ML
15 SOLUTION DENTAL 2 TIMES DAILY
Status: DISCONTINUED | OUTPATIENT
Start: 2024-01-18 | End: 2024-01-21

## 2024-01-18 RX ORDER — ENOXAPARIN SODIUM 100 MG/ML
40 INJECTION SUBCUTANEOUS DAILY
Status: DISCONTINUED | OUTPATIENT
Start: 2024-01-18 | End: 2024-01-18

## 2024-01-18 RX ORDER — ACETAMINOPHEN 650 MG/1
650 SUPPOSITORY RECTAL EVERY 6 HOURS PRN
Status: DISCONTINUED | OUTPATIENT
Start: 2024-01-18 | End: 2024-01-19

## 2024-01-18 RX ORDER — ATORVASTATIN CALCIUM 40 MG/1
40 TABLET, FILM COATED ORAL DAILY
Status: DISCONTINUED | OUTPATIENT
Start: 2024-01-18 | End: 2024-01-22 | Stop reason: HOSPADM

## 2024-01-18 RX ORDER — FAMOTIDINE 20 MG/1
20 TABLET, FILM COATED ORAL 2 TIMES DAILY
Status: DISCONTINUED | OUTPATIENT
Start: 2024-01-18 | End: 2024-01-18

## 2024-01-18 RX ORDER — ACETAMINOPHEN 325 MG/1
650 TABLET ORAL EVERY 6 HOURS PRN
Status: DISCONTINUED | OUTPATIENT
Start: 2024-01-18 | End: 2024-01-19

## 2024-01-18 RX ORDER — NOREPINEPHRINE BITARTRATE 0.06 MG/ML
INJECTION, SOLUTION INTRAVENOUS
Status: COMPLETED
Start: 2024-01-18 | End: 2024-01-18

## 2024-01-18 RX ORDER — ONDANSETRON 4 MG/1
4 TABLET, ORALLY DISINTEGRATING ORAL EVERY 8 HOURS PRN
Status: DISCONTINUED | OUTPATIENT
Start: 2024-01-18 | End: 2024-01-22 | Stop reason: HOSPADM

## 2024-01-18 RX ORDER — LEVOTHYROXINE SODIUM 0.12 MG/1
125 TABLET ORAL DAILY
Status: DISCONTINUED | OUTPATIENT
Start: 2024-01-18 | End: 2024-01-22 | Stop reason: HOSPADM

## 2024-01-18 RX ORDER — ROCURONIUM BROMIDE 10 MG/ML
100 INJECTION, SOLUTION INTRAVENOUS ONCE
Status: COMPLETED | OUTPATIENT
Start: 2024-01-18 | End: 2024-01-18

## 2024-01-18 RX ORDER — NOREPINEPHRINE BITARTRATE 0.06 MG/ML
1-100 INJECTION, SOLUTION INTRAVENOUS CONTINUOUS
Status: DISCONTINUED | OUTPATIENT
Start: 2024-01-18 | End: 2024-01-22 | Stop reason: HOSPADM

## 2024-01-18 RX ORDER — POLYETHYLENE GLYCOL 3350 17 G/17G
17 POWDER, FOR SOLUTION ORAL DAILY PRN
Status: DISCONTINUED | OUTPATIENT
Start: 2024-01-18 | End: 2024-01-18

## 2024-01-18 RX ORDER — FENTANYL CITRATE 50 UG/ML
50 INJECTION, SOLUTION INTRAMUSCULAR; INTRAVENOUS
Status: DISCONTINUED | OUTPATIENT
Start: 2024-01-18 | End: 2024-01-22 | Stop reason: HOSPADM

## 2024-01-18 RX ORDER — ETOMIDATE 2 MG/ML
20 INJECTION INTRAVENOUS ONCE
Status: COMPLETED | OUTPATIENT
Start: 2024-01-18 | End: 2024-01-18

## 2024-01-18 RX ORDER — 0.9 % SODIUM CHLORIDE 0.9 %
500 INTRAVENOUS SOLUTION INTRAVENOUS ONCE
Status: COMPLETED | OUTPATIENT
Start: 2024-01-18 | End: 2024-01-18

## 2024-01-18 RX ORDER — SODIUM CHLORIDE 0.9 % (FLUSH) 0.9 %
5-40 SYRINGE (ML) INJECTION EVERY 12 HOURS SCHEDULED
Status: DISCONTINUED | OUTPATIENT
Start: 2024-01-18 | End: 2024-01-22 | Stop reason: HOSPADM

## 2024-01-18 RX ORDER — PROPOFOL 10 MG/ML
INJECTION, EMULSION INTRAVENOUS
Status: COMPLETED
Start: 2024-01-18 | End: 2024-01-18

## 2024-01-18 RX ORDER — POTASSIUM CHLORIDE 20 MEQ/1
40 TABLET, EXTENDED RELEASE ORAL PRN
Status: DISCONTINUED | OUTPATIENT
Start: 2024-01-18 | End: 2024-01-22 | Stop reason: HOSPADM

## 2024-01-18 RX ORDER — SODIUM CHLORIDE 9 MG/ML
INJECTION, SOLUTION INTRAVENOUS PRN
Status: DISCONTINUED | OUTPATIENT
Start: 2024-01-18 | End: 2024-01-22 | Stop reason: HOSPADM

## 2024-01-18 RX ORDER — MAGNESIUM SULFATE IN WATER 40 MG/ML
2000 INJECTION, SOLUTION INTRAVENOUS PRN
Status: DISCONTINUED | OUTPATIENT
Start: 2024-01-18 | End: 2024-01-22 | Stop reason: HOSPADM

## 2024-01-18 RX ORDER — ATROPINE SULFATE 0.1 MG/ML
INJECTION INTRAVENOUS
Status: COMPLETED
Start: 2024-01-18 | End: 2024-01-18

## 2024-01-18 RX ORDER — POTASSIUM CHLORIDE 7.45 MG/ML
10 INJECTION INTRAVENOUS PRN
Status: DISCONTINUED | OUTPATIENT
Start: 2024-01-18 | End: 2024-01-22 | Stop reason: HOSPADM

## 2024-01-18 RX ORDER — SODIUM CHLORIDE 0.9 % (FLUSH) 0.9 %
5-40 SYRINGE (ML) INJECTION PRN
Status: DISCONTINUED | OUTPATIENT
Start: 2024-01-18 | End: 2024-01-22 | Stop reason: HOSPADM

## 2024-01-18 RX ORDER — MIDAZOLAM HYDROCHLORIDE 2 MG/2ML
2 INJECTION, SOLUTION INTRAMUSCULAR; INTRAVENOUS
Status: DISCONTINUED | OUTPATIENT
Start: 2024-01-18 | End: 2024-01-22 | Stop reason: HOSPADM

## 2024-01-18 RX ORDER — POLYETHYLENE GLYCOL 3350 17 G/17G
17 POWDER, FOR SOLUTION ORAL 2 TIMES DAILY
Status: DISCONTINUED | OUTPATIENT
Start: 2024-01-18 | End: 2024-01-22 | Stop reason: HOSPADM

## 2024-01-18 RX ORDER — SODIUM CHLORIDE 9 MG/ML
INJECTION, SOLUTION INTRAVENOUS CONTINUOUS
Status: DISCONTINUED | OUTPATIENT
Start: 2024-01-18 | End: 2024-01-19

## 2024-01-18 RX ORDER — PROPOFOL 10 MG/ML
5-50 INJECTION, EMULSION INTRAVENOUS CONTINUOUS
Status: DISCONTINUED | OUTPATIENT
Start: 2024-01-18 | End: 2024-01-19

## 2024-01-18 RX ORDER — PROPOFOL 10 MG/ML
5-50 INJECTION, EMULSION INTRAVENOUS CONTINUOUS
Status: DISCONTINUED | OUTPATIENT
Start: 2024-01-18 | End: 2024-01-18

## 2024-01-18 RX ORDER — ONDANSETRON 2 MG/ML
4 INJECTION INTRAMUSCULAR; INTRAVENOUS EVERY 6 HOURS PRN
Status: DISCONTINUED | OUTPATIENT
Start: 2024-01-18 | End: 2024-01-22 | Stop reason: HOSPADM

## 2024-01-18 RX ORDER — FAMOTIDINE 10 MG/ML
20 INJECTION, SOLUTION INTRAVENOUS 2 TIMES DAILY
Status: DISCONTINUED | OUTPATIENT
Start: 2024-01-18 | End: 2024-01-19

## 2024-01-18 RX ADMIN — PROPOFOL 20 MCG/KG/MIN: 10 INJECTION, EMULSION INTRAVENOUS at 12:29

## 2024-01-18 RX ADMIN — SODIUM CHLORIDE 500 ML: 9 INJECTION, SOLUTION INTRAVENOUS at 10:06

## 2024-01-18 RX ADMIN — ATROPINE SULFATE: 0.1 INJECTION, SOLUTION INTRAVENOUS at 12:05

## 2024-01-18 RX ADMIN — NOREPINEPHRINE BITARTRATE 2 MCG/MIN: 0.06 INJECTION, SOLUTION INTRAVENOUS at 12:16

## 2024-01-18 RX ADMIN — LEVOTHYROXINE SODIUM 125 MCG: 0.12 TABLET ORAL at 08:05

## 2024-01-18 RX ADMIN — ATORVASTATIN CALCIUM 40 MG: 40 TABLET, FILM COATED ORAL at 08:05

## 2024-01-18 RX ADMIN — ETOMIDATE 20 MG: 2 INJECTION, SOLUTION INTRAVENOUS at 12:07

## 2024-01-18 RX ADMIN — PIPERACILLIN AND TAZOBACTAM 4500 MG: 4; .5 INJECTION, POWDER, FOR SOLUTION INTRAVENOUS at 06:22

## 2024-01-18 RX ADMIN — RIVAROXABAN 20 MG: 20 TABLET, FILM COATED ORAL at 08:05

## 2024-01-18 RX ADMIN — ROCURONIUM BROMIDE 100 MG: 10 INJECTION INTRAVENOUS at 12:05

## 2024-01-18 RX ADMIN — POLYETHYLENE GLYCOL (3350) 17 G: 17 POWDER, FOR SOLUTION ORAL at 08:05

## 2024-01-18 RX ADMIN — SODIUM CHLORIDE: 9 INJECTION, SOLUTION INTRAVENOUS at 11:29

## 2024-01-18 RX ADMIN — FAMOTIDINE 20 MG: 20 TABLET ORAL at 08:05

## 2024-01-18 RX ADMIN — FAMOTIDINE 20 MG: 10 INJECTION, SOLUTION INTRAVENOUS at 21:23

## 2024-01-18 RX ADMIN — SODIUM CHLORIDE, PRESERVATIVE FREE 10 ML: 5 INJECTION INTRAVENOUS at 21:23

## 2024-01-18 RX ADMIN — PIPERACILLIN AND TAZOBACTAM 3375 MG: 3; .375 INJECTION, POWDER, FOR SOLUTION INTRAVENOUS at 23:09

## 2024-01-18 RX ADMIN — CHLORHEXIDINE GLUCONATE 0.12% ORAL RINSE 15 ML: 1.2 LIQUID ORAL at 13:59

## 2024-01-18 RX ADMIN — FAMOTIDINE 20 MG: 10 INJECTION, SOLUTION INTRAVENOUS at 13:59

## 2024-01-18 RX ADMIN — CHLORHEXIDINE GLUCONATE 0.12% ORAL RINSE 15 ML: 1.2 LIQUID ORAL at 21:23

## 2024-01-18 RX ADMIN — SODIUM CHLORIDE: 9 INJECTION, SOLUTION INTRAVENOUS at 23:40

## 2024-01-18 RX ADMIN — PIPERACILLIN AND TAZOBACTAM 3375 MG: 3; .375 INJECTION, POWDER, FOR SOLUTION INTRAVENOUS at 14:08

## 2024-01-18 RX ADMIN — SODIUM CHLORIDE, PRESERVATIVE FREE 10 ML: 5 INJECTION INTRAVENOUS at 08:06

## 2024-01-18 RX ADMIN — ACETAMINOPHEN 650 MG: 650 SUPPOSITORY RECTAL at 23:04

## 2024-01-18 RX ADMIN — DOCUSATE SODIUM 100 MG: 100 CAPSULE, LIQUID FILLED ORAL at 08:05

## 2024-01-18 ASSESSMENT — PULMONARY FUNCTION TESTS
PIF_VALUE: 24
PIF_VALUE: 25
PIF_VALUE: 22
PIF_VALUE: 23
PIF_VALUE: 22
PIF_VALUE: 23
PIF_VALUE: 21
PIF_VALUE: 29
PIF_VALUE: 21
PIF_VALUE: 24
PIF_VALUE: 22
PIF_VALUE: 21
PIF_VALUE: 21
PIF_VALUE: 25
PIF_VALUE: 20
PIF_VALUE: 22

## 2024-01-18 NOTE — ED NOTES
Called for assistance in room 22, pts eyes deviated to right and hypoxic. This RN began bagging pt at this time. Resp called to room. Pt oxygen increased to 98%. Pt becoming more responsive while being bagged. Pt able to shut eyes at this time.  Pt placed on 4 Liters nasal canula and taken to CT STAT per Dr. Tran and Yulisa BUCK

## 2024-01-18 NOTE — H&P
V2.0  History and Physical      Name:  Saravanan Cooley /Age/Sex: 1965  (58 y.o. male)   MRN & CSN:  [id number] & [id number] Encounter Date/Time: 2024 12:46 AM EST   Location:  ED15/ED-15 PCP: Yohana Johnson MD       Assessment and Plan:   Saravanan Cooley is a 58 y.o. male with MRDD, hypothyroidism, Hx of PE/DVT, Down syndrome presented from group home due to fatigue and cough.    Acute respiratory failure with hypoxia  Probably related to aspiration pneumonitis  Respiratory viral culture negative  CT chest personally reviewed, scattered bibasilar infiltrates  Start IV Zosyn  Check inflammatory markers  Aspiration precautions and SLP evaluation    Stool impaction  Disimpaction in ER  Optimize bowel regimen    MRDD/Down syndrome  Nonverbal at baseline  Resides at group home    Hypothyroidism  Continue Synthroid    History of PE/DVT  Continue home Xarelto    Inpatient, MedSurg with telemetry  Full code    Disposition:   Current Living situation: Home  Expected Disposition: Home  Estimated D/C: 2 days    Diet Diet NPO   DVT Prophylaxis [] Lovenox, []  Heparin, [] SCDs, [] Ambulation,  [] Eliquis, [x] Xarelto, [] Coumadin   Code Status Full Code   Surrogate Decision Maker/ VELVET Underwood patient advocate     Personally reviewed Lab Studies and Imaging     Discussed management of the case with ER provider who recommended admission for respiratory failure with hypoxia.    EKG interpreted personally and results NSR 91/min no acute ST-T wave abnormalities.    History from:     EMR    History of Present Illness:     Chief Complaint: Fatigue and chest congestion    Saravanan Cooley is a 58 y.o. male with MRDD, hypothyroidism, Hx of PE/DVT, Down syndrome presented from group home due to fatigue and cough.  Patient presented from group home because yesterday evening he started feeling fatigued and had generalized weakness with noticeable chest congestion by caregiver.  Reportedly was unable to get out of bed all day and

## 2024-01-18 NOTE — PLAN OF CARE
Problem: Discharge Planning  Goal: Discharge to home or other facility with appropriate resources  Outcome: Progressing     Problem: Safety - Adult  Goal: Free from fall injury  Outcome: Progressing     Problem: Skin/Tissue Integrity  Goal: Absence of new skin breakdown  Description: 1.  Monitor for areas of redness and/or skin breakdown  2.  Assess vascular access sites hourly  3.  Every 4-6 hours minimum:  Change oxygen saturation probe site  4.  Every 4-6 hours:  If on nasal continuous positive airway pressure, respiratory therapy assess nares and determine need for appliance change or resting period.  Outcome: Progressing     Problem: Safety - Adult  Goal: Free from fall injury  Outcome: Progressing     Problem: Skin/Tissue Integrity  Goal: Absence of new skin breakdown  Description: 1.  Monitor for areas of redness and/or skin breakdown  2.  Assess vascular access sites hourly  3.  Every 4-6 hours minimum:  Change oxygen saturation probe site  4.  Every 4-6 hours:  If on nasal continuous positive airway pressure, respiratory therapy assess nares and determine need for appliance change or resting period.  Outcome: Progressing

## 2024-01-18 NOTE — ED NOTES
Medication History  [facility]    Patient Name: Saravanan Cooley 1965     Medication history has been completed by: Renata Chan CPhT    Source(s) of information: Medication list provided by [facility] and insurance claims     Primary Care Physician: Yohana Johnson MD     Pharmacy: [facility]    Allergies as of 01/17/2024 - Fully Reviewed 01/17/2024   Allergen Reaction Noted    Neosporin [neomycin-bacitracin zn-polymyx]  10/03/2010        Prior to Admission medications    Medication Sig Start Date End Date Taking? Authorizing Provider   Mineral Oil OIL Fill bilateral ear canal with mineral oil nightly x 30 days 12/20/23   Yohana Johnson MD   atorvastatin (LIPITOR) 40 MG tablet Take 1 tablet by mouth daily 12/5/23   Yohana Johnson MD   famotidine (PEPCID) 20 MG tablet Take 1 tablet by mouth 2 times daily 12/5/23   Yohana Johnson MD   melatonin (RA MELATONIN) 3 MG TABS tablet Take 1 tablet by mouth Daily with supper 12/5/23   Yohana Johnson MD   rivaroxaban (XARELTO) 20 MG TABS tablet Take 1 tablet by mouth daily 12/5/23   Yohana Johnson MD   Calcium Polycarbophil (FIBER) 625 MG TABS Take 3 tablets by mouth daily 10/30/23 10/24/24  Yohana Johnson MD   Incontinence Supply Disposable (DEPEND UNDERWEAR SM/MED) MISC 1 Units by Does not apply route 4 times daily 10/10/23   Yohana Johnson MD   levothyroxine (SYNTHROID) 125 MCG tablet Take 1 tablet by mouth daily 6/6/23   Yohana Johnson MD   Cholecalciferol (VITAMIN D) 125 MCG (5000 UT) CAPS Take 125 mcg by mouth daily  Patient taking differently: Take 5,000 Units by mouth daily 6/5/23   Yohana Johnson MD     Comments:  Medication list provided by [facility] and insurance claims verified.  Xarelto therapy updated per list patient takes at 6 am.     To my knowledge the above medication history is accurate as of 1/18/2024 9:39 AM.   Renata Chan CPhT   1/18/2024 9:39 AM

## 2024-01-18 NOTE — PROCEDURES
4 Eyes Skin Assessment     NAME:  Saravanan Cooley  YOB: 1965  MEDICAL RECORD NUMBER:  [id number]    The patient is being assessed for  Admission    I agree that at least one RN has performed a thorough Head to Toe Skin Assessment on the patient. ALL assessment sites listed below have been assessed.      Areas assessed by both nurses:    Sacrum. Buttock, Coccyx, Ischium        Does the Patient have a Wound? Yes wound(s) were present on assessment. LDA wound assessment was Initiated and completed by RN       Javi Prevention initiated by RN: Yes  Wound Care Orders initiated by RN: Yes    Pressure Injury (Stage 3,4, Unstageable, DTI, NWPT, and Complex wounds) if present, place Wound referral order by RN under : Yes    New Ostomies, if present place, Ostomy referral order under : Yes     Nurse 1 eSignature: Electronically signed by Rama Perez RN on 1/18/24 at 1:43 PM EST    **SHARE this note so that the co-signing nurse can place an eSignature**    Nurse 2 eSignature: Electronically signed by Caro Goss RN on 1/18/24 at 2:56 PM EST

## 2024-01-18 NOTE — CONSULTS
[facility] Nurse  Consult Note       Saravanan Cooley  AGE: 58 y.o.   GENDER: male  : 1965  TODAY'S DATE:  2024    Subjective:     Reason for Evaluation and Assessment: wound care evalTony Cooley is a 58 y.o. 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 No pertinent family history.    SOCIAL HISTORY    Social History     Tobacco Use    Smoking status: Never    Smokeless tobacco: Never   Vaping Use    Vaping Use: Never used   Substance Use Topics    Alcohol use: No    Drug use: No       ALLERGIES    Allergies   Allergen Reactions    Neosporin [Neomycin-Bacitracin Zn-Polymyx]        MEDICATIONS    No current facility-administered medications on file prior to encounter.     Current Outpatient Medications on File Prior to Encounter   Medication Sig Dispense Refill    Mineral Oil OIL Fill bilateral ear canal with mineral oil nightly x 30 days 480 mL 0    atorvastatin (LIPITOR) 40 MG tablet Take 1 tablet by mouth daily 30 tablet 11    famotidine (PEPCID) 20 MG tablet Take 1 
Comprehensive Nutrition Assessment    Type and Reason for Visit:  Initial, Consult (TF order/management; vent)    Nutrition Recommendations/Plan:   Once EN access is placed, start TF- Vital AF 1.2 (peptide based formula) @ 40ml/hr to provide 1342kcal (including kcal from propofol) and 72g PRO  If patient without IVF, provide 100ml FWF Q4H to provide 1400ml fluids per day  Monitor weight, GI status, glucose, lytes, HOB, POC     Malnutrition Assessment:  Malnutrition Status:  Insufficient data (01/18/24 1505)    Context:  Social/Environmental Circumstances       Nutrition Assessment:    Admitted w/ PNA, with MRDD, hypothyroidism, Hx of PE/DVT, Down syndrome. Pt had RR called today, intubated & transferred to ICU, currently sedated, on 1 pressor, MAP 81. Unable to place NGT today. Noted possible significant wt loss of 16.2% over the past 7mo per chart review. Will order TF to start whenever EN access is able to be placed. Follow at high nutrition risk.    Nutrition Related Findings:    +levo, propofol, NaCl infusion; POC glucose 147-178, Na 133; noted to be non-verbal as baseline; from NH Wound Type: Wound Consult Pending       Current Nutrition Intake & Therapies:    Average Meal Intake: NPO  Average Supplements Intake: NPO  No diet orders on file  Additional Calorie Sources:  190kcal from propofol @ 7.2    Anthropometric Measures:  Height: 154.9 cm (5' 1\")  Ideal Body Weight (IBW): 112 lbs (51 kg)    Admission Body Weight: 59.7 kg (131 lb 9.8 oz)  Current Body Weight: 59.7 kg (131 lb 9.8 oz),   IBW. Weight Source: Not Specified  Current BMI (kg/m2): 24.9  Usual Body Weight: 71.2 kg (156 lb 15.5 oz) (6/2023)  % Weight Change (Calculated): -16.2  Weight Adjustment For: No Adjustment                 BMI Categories: Normal Weight (BMI 18.5-24.9)    Estimated Daily Nutrient Needs:  Energy Requirements Based On: Formula  Weight Used for Energy Requirements: Current  Energy (kcal/day): 1417 (PSU 2003b)  Weight Used for 
mA/kV was utilized to reduce the radiation dose to as low as reasonably achievable. COMPARISON: 12/04/2023 HISTORY: Episode of unresponsiveness. FINDINGS: BRAIN/VENTRICLES: No acute intracranial hemorrhage, mass effect, or midline shift.  No abnormal extra-axial fluid collection.  The gray-white differentiation is maintained without evidence of an acute infarct.  Stable parenchymal volume loss and prominence of the ventricles. ORBITS: The visualized portion of the orbits demonstrate no acute abnormality. SINUSES: The visualized paranasal sinuses and mastoid air cells demonstrate no acute abnormality. SOFT TISSUES/SKULL:  No acute abnormality.  Cerumen within the external auditory canals, right greater than left.     No acute abnormality.     XR CHEST PORTABLE    Result Date: 1/17/2024  EXAMINATION: ONE XRAY VIEW OF THE CHEST 1/17/2024 5:35 pm COMPARISON: Chest x-ray dated December 4, 2023 HISTORY: ORDERING SYSTEM PROVIDED HISTORY: cough, lethargy TECHNOLOGIST PROVIDED HISTORY: Reason for exam:->cough, lethargy Reason for Exam: cough, lethargy Additional signs and symptoms: na Relevant Medical/Surgical History: na FINDINGS: Normal cardiomediastinal silhouette.  Left basilar airspace disease.  No pneumothorax or pleural effusion     Left basilar airspace disease which could represent atelectasis and/or pneumonia       CBC:   Recent Labs     01/17/24 1850 01/18/24  0543   WBC 11.3* 10.6*   HGB 12.2* 12.4*   * 543*     BMP:    Recent Labs     01/17/24  1850 01/18/24  0543   * 133*   K 4.1 4.2   CL 94* 95*   CO2 26 25   BUN 12 19   CREATININE 0.8* 0.9   GLUCOSE 124* 147*     Hepatic:   Recent Labs     01/17/24  1850 01/18/24  0543   AST 17 14*   ALT 26 25   BILITOT 0.6 0.5   ALKPHOS 83 77     Lipids:   Lab Results   Component Value Date/Time    CHOL 126 06/05/2023 10:07 AM    HDL 55 06/05/2023 10:07 AM    TRIG 71 06/05/2023 10:07 AM     Hemoglobin A1C: No results found for: \"LABA1C\"  TSH:   Lab Results

## 2024-01-18 NOTE — ED PROVIDER NOTES
RAPID RESPONSE NOTE:    Went to room in the emergency department while patient was boarding as a rapid response.  Patient apparently had had sinus pauses on his telemetry and was becoming less responsive.  EKG obtained demonstrating a likely complete heart block which was apparently transient as he is now in a sinus tachycardia on my assessment.  Patient's mental status and oxygenation are worsening.  Patient is on nonrebreather and high flow nasal cannula with oxygen saturation 89% and is slumped over nonverbal and not following commands.  Decision made to intubate patient for airway protection and for better oxygenation in the setting of his likely pneumonia.    Procedure Note - Intubation:  The benefits, risks, and alternatives of intubation were NOT discussed with the patient and consent was implied for the procedure given emergent condition. Pre-oxygenation was administered and the appropriate equipment and staff were made available at the bedside. Saravanan Cooley was sedated/paralyzed; please see the chart for the drugs and dosages administered. A Marian 3 laryngoscope blade was used for a grade 1 view and a 7.5mm endotracheal tube was viewed to pass through the cords on the first attempt. The tube was secured at 21 cm to the lip (right). Tracheal position was confirmed using a colorimetric end-tidal CO2 detector, tube condensation and chest auscultation/visualization. Respiratory therapy is at the bedside and is assisting with ventilatory management.         Patient intubated with RSI as above after confirming full CODE STATUS.  Critical care LIMA team and hospitalist team also at bedside and patient will be going to the ICU.  Patient will be placed on fentanyl and Versed drips which are being ordered.  Stat portable chest x-ray is also obtained to confirm tube placement.  Care is transitioned to inpatient team again.    MD Demetrius Arriaga Andrew, MD  01/18/24 9871

## 2024-01-18 NOTE — ED NOTES
ED TO INPATIENT SBAR HANDOFF    Patient Name: Saravanan Cooley   :  1965  58 y.o.   Preferred Name    Family/Caregiver Present no   Restraints no   C-SSRS: Risk of Suicide: No Risk  Sitter no   Sepsis Risk Score Sepsis Risk Score: 1.31      Situation  Chief Complaint   Patient presents with    Fatigue     Patient is from group home. Patient is MRDD usually gets up and goes to work but patient is fatigued and not wanting to get up as normal    Cough     Brief Description of Patient's Condition: Pt presented to ED from facility with c/o fatigue and cough. Pt is MRDD from group home. Pt is non verbal but normally able to \"get up and go to work\". Pt requiring 10L NRB at this time. Pt has pneumonia in left lung. Pt had a moment where his O2 dropped into 70s and he was unresponsive, while bagging pt became responsive and O2 increased.   Mental Status: Nonverbal at baseline, can track with eyes  Arrived from: group home    Imaging:   CT CHEST ABDOMEN PELVIS W CONTRAST Additional Contrast? None   Final Result   1. Marked distention of the rectum with appearance concerning for fecal   impaction. The sigmoid is distended, and significantly redundant, extends to   the left upper abdomen. The remaining large bowel loops are not distended.   2. The urinary bladder is markedly distended, extends to the lower abdomen.   3. There is focal dilatation of the upper esophagus containing air-fluid   level. There is mucosal thickening and mild dilatation of the distal   esophagus.   4. Low lung volumes with interstitial opacities in the posterior right upper   lobe, could represent atelectasis or scarring. Atelectatic changes are seen   in the lung bases, right greater than left.   5. Cholelithiasis, no inflammatory changes.   6. Severe thoracolumbar S-shaped scoliosis.         CT HEAD WO CONTRAST   Final Result   No acute abnormality.         XR CHEST PORTABLE   Final Result   Left basilar airspace disease which could represent

## 2024-01-19 ENCOUNTER — APPOINTMENT (OUTPATIENT)
Dept: GENERAL RADIOLOGY | Age: 59
DRG: 133 | End: 2024-01-19
Payer: MEDICAID

## 2024-01-19 LAB
ALBUMIN SERPL-MCNC: 2.3 GM/DL (ref 3.4–5)
ALBUMIN SERPL-MCNC: 2.4 GM/DL (ref 3.4–5)
ALBUMIN SERPL-MCNC: 2.5 GM/DL (ref 3.4–5)
ALP BLD-CCNC: 64 IU/L (ref 40–129)
ALP BLD-CCNC: 65 IU/L (ref 40–129)
ALP BLD-CCNC: 66 IU/L (ref 40–129)
ALT SERPL-CCNC: 23 U/L (ref 10–40)
ALT SERPL-CCNC: 27 U/L (ref 10–40)
ALT SERPL-CCNC: 28 U/L (ref 10–40)
ANION GAP SERPL CALCULATED.3IONS-SCNC: 11 MMOL/L (ref 7–16)
ANION GAP SERPL CALCULATED.3IONS-SCNC: 6 MMOL/L (ref 7–16)
ANION GAP SERPL CALCULATED.3IONS-SCNC: 9 MMOL/L (ref 7–16)
APTT: 38.6 SECONDS (ref 25.1–37.1)
AST SERPL-CCNC: 22 IU/L (ref 15–37)
AST SERPL-CCNC: 24 IU/L (ref 15–37)
AST SERPL-CCNC: 26 IU/L (ref 15–37)
BASE EXCESS MIXED: 0.8 (ref 0–3)
BASE EXCESS MIXED: 1.5 (ref 0–3)
BASE EXCESS MIXED: 5.5 (ref 0–3)
BASOPHILS ABSOLUTE: 0 K/CU MM
BASOPHILS RELATIVE PERCENT: 0.1 % (ref 0–1)
BILIRUB SERPL-MCNC: 0.3 MG/DL (ref 0–1)
BILIRUB SERPL-MCNC: 0.3 MG/DL (ref 0–1)
BILIRUB SERPL-MCNC: 0.4 MG/DL (ref 0–1)
BILIRUBIN DIRECT: 0.2 MG/DL (ref 0–0.3)
BILIRUBIN, INDIRECT: 0.1 MG/DL (ref 0–0.7)
BILIRUBIN, INDIRECT: 0.1 MG/DL (ref 0–0.7)
BILIRUBIN, INDIRECT: 0.2 MG/DL (ref 0–0.7)
BUN SERPL-MCNC: 18 MG/DL (ref 6–23)
BUN SERPL-MCNC: 20 MG/DL (ref 6–23)
BUN SERPL-MCNC: 21 MG/DL (ref 6–23)
CALCIUM IONIZED: 4.52 MG/DL (ref 4.48–5.28)
CALCIUM IONIZED: 4.64 MG/DL (ref 4.48–5.28)
CALCIUM IONIZED: 4.72 MG/DL (ref 4.48–5.28)
CALCIUM SERPL-MCNC: 7.2 MG/DL (ref 8.3–10.6)
CALCIUM SERPL-MCNC: 7.3 MG/DL (ref 8.3–10.6)
CALCIUM SERPL-MCNC: 7.6 MG/DL (ref 8.3–10.6)
CARBON MONOXIDE, BLOOD: 1.9 % (ref 0–5)
CARBON MONOXIDE, BLOOD: 1.9 % (ref 0–5)
CARBON MONOXIDE, BLOOD: 2.2 % (ref 0–5)
CHLORIDE BLD-SCNC: 105 MMOL/L (ref 99–110)
CO2 CONTENT: 26.5 MMOL/L (ref 21–32)
CO2 CONTENT: 27.1 MMOL/L (ref 21–32)
CO2 CONTENT: 30 MMOL/L (ref 21–32)
CO2: 24 MMOL/L (ref 21–32)
CO2: 24 MMOL/L (ref 21–32)
CO2: 27 MMOL/L (ref 21–32)
COMMENT: ABNORMAL
CREAT SERPL-MCNC: 0.7 MG/DL (ref 0.9–1.3)
CREAT SERPL-MCNC: 0.8 MG/DL (ref 0.9–1.3)
CREAT SERPL-MCNC: 0.9 MG/DL (ref 0.9–1.3)
D DIMER: 0.92 UG/ML (FEU)
DIFFERENTIAL TYPE: ABNORMAL
EKG ATRIAL RATE: 114 BPM
EKG ATRIAL RATE: 30 BPM
EKG ATRIAL RATE: 50 BPM
EKG ATRIAL RATE: 91 BPM
EKG DIAGNOSIS: NORMAL
EKG P AXIS: 35 DEGREES
EKG P AXIS: 35 DEGREES
EKG P AXIS: 38 DEGREES
EKG P-R INTERVAL: 140 MS
EKG P-R INTERVAL: 156 MS
EKG P-R INTERVAL: 170 MS
EKG Q-T INTERVAL: 364 MS
EKG Q-T INTERVAL: 384 MS
EKG Q-T INTERVAL: 488 MS
EKG Q-T INTERVAL: 582 MS
EKG QRS DURATION: 76 MS
EKG QRS DURATION: 84 MS
EKG QRS DURATION: 90 MS
EKG QRS DURATION: 94 MS
EKG QTC CALCULATION (BAZETT): 444 MS
EKG QTC CALCULATION (BAZETT): 468 MS
EKG QTC CALCULATION (BAZETT): 472 MS
EKG QTC CALCULATION (BAZETT): 501 MS
EKG R AXIS: -5 DEGREES
EKG R AXIS: 17 DEGREES
EKG R AXIS: 20 DEGREES
EKG R AXIS: 5 DEGREES
EKG T AXIS: 13 DEGREES
EKG T AXIS: 32 DEGREES
EKG T AXIS: 40 DEGREES
EKG T AXIS: 41 DEGREES
EKG VENTRICULAR RATE: 114 BPM
EKG VENTRICULAR RATE: 39 BPM
EKG VENTRICULAR RATE: 50 BPM
EKG VENTRICULAR RATE: 91 BPM
EOSINOPHILS ABSOLUTE: 0 K/CU MM
EOSINOPHILS RELATIVE PERCENT: 0 % (ref 0–3)
FIBRINOGEN LEVEL: 926 MG/DL (ref 170–540)
GFR SERPL CREATININE-BSD FRML MDRD: >60 ML/MIN/1.73M2
GLUCOSE SERPL-MCNC: 142 MG/DL (ref 70–99)
GLUCOSE SERPL-MCNC: 152 MG/DL (ref 70–99)
GLUCOSE SERPL-MCNC: 159 MG/DL (ref 70–99)
HCO3 ARTERIAL: 25.3 MMOL/L (ref 21–28)
HCO3 ARTERIAL: 25.9 MMOL/L (ref 21–28)
HCO3 ARTERIAL: 28.9 MMOL/L (ref 21–28)
HCT VFR BLD CALC: 35 % (ref 42–52)
HEMOGLOBIN: 11.3 GM/DL (ref 13.5–18)
IMMATURE NEUTROPHIL %: 0.5 % (ref 0–0.43)
INR BLD: 2.2 INDEX
IONIZED CA: 1.13 MMOL/L (ref 1.12–1.32)
IONIZED CA: 1.16 MMOL/L (ref 1.12–1.32)
IONIZED CA: 1.18 MMOL/L (ref 1.12–1.32)
L PNEUMO AG UR QL IA: NEGATIVE
LACTATE: 1.3 MMOL/L (ref 0.5–1.9)
LACTATE: 1.9 MMOL/L (ref 0.5–1.9)
LACTATE: 1.9 MMOL/L (ref 0.5–1.9)
LYMPHOCYTES ABSOLUTE: 0.7 K/CU MM
LYMPHOCYTES RELATIVE PERCENT: 3.8 % (ref 24–44)
MAGNESIUM: 2.4 MG/DL (ref 1.8–2.4)
MAGNESIUM: 2.7 MG/DL (ref 1.8–2.4)
MAGNESIUM: 2.7 MG/DL (ref 1.8–2.4)
MCH RBC QN AUTO: 31.7 PG (ref 27–31)
MCHC RBC AUTO-ENTMCNC: 32.3 % (ref 32–36)
MCV RBC AUTO: 98.3 FL (ref 78–100)
METHEMOGLOBIN ARTERIAL: 0.8 %
METHEMOGLOBIN ARTERIAL: 1.4 %
METHEMOGLOBIN ARTERIAL: 1.4 %
MONOCYTES ABSOLUTE: 0.6 K/CU MM
MONOCYTES RELATIVE PERCENT: 3.2 % (ref 0–4)
MRSA, DNA, NASAL: NEGATIVE
NUCLEATED RBC %: 0 %
O2 SATURATION: 81.4 % (ref 94–98)
O2 SATURATION: 94.2 % (ref 94–98)
O2 SATURATION: 95.1 % (ref 94–98)
PCO2 ARTERIAL: 37 MMHG (ref 35–48)
PCO2 ARTERIAL: 39 MMHG (ref 35–48)
PCO2 ARTERIAL: 39 MMHG (ref 35–48)
PDW BLD-RTO: 15.1 % (ref 11.7–14.9)
PH BLOOD: 7.42 (ref 7.35–7.45)
PH BLOOD: 7.43 (ref 7.35–7.45)
PH BLOOD: 7.5 (ref 7.35–7.45)
PHOSPHORUS: 1.4 MG/DL (ref 2.5–4.9)
PHOSPHORUS: 2 MG/DL (ref 2.5–4.9)
PHOSPHORUS: 3.3 MG/DL (ref 2.5–4.9)
PLATELET # BLD: 570 K/CU MM (ref 140–440)
PMV BLD AUTO: 9.1 FL (ref 7.5–11.1)
PO2 ARTERIAL: 46 MMHG (ref 83–108)
PO2 ARTERIAL: 76 MMHG (ref 83–108)
PO2 ARTERIAL: 83 MMHG (ref 83–108)
POTASSIUM SERPL-SCNC: 3.9 MMOL/L (ref 3.5–5.1)
POTASSIUM SERPL-SCNC: 4.2 MMOL/L (ref 3.5–5.1)
POTASSIUM SERPL-SCNC: 4.3 MMOL/L (ref 3.5–5.1)
PROTHROMBIN TIME: 24.7 SECONDS (ref 11.7–14.5)
RBC # BLD: 3.56 M/CU MM (ref 4.6–6.2)
S PNEUM AG CSF QL: NORMAL
SEGMENTED NEUTROPHILS ABSOLUTE COUNT: 17.2 K/CU MM
SEGMENTED NEUTROPHILS RELATIVE PERCENT: 92.4 % (ref 36–66)
SODIUM BLD-SCNC: 138 MMOL/L (ref 135–145)
SODIUM BLD-SCNC: 138 MMOL/L (ref 135–145)
SODIUM BLD-SCNC: 140 MMOL/L (ref 135–145)
SPECIMEN DESCRIPTION: NORMAL
TOTAL IMMATURE NEUTOROPHIL: 0.1 K/CU MM
TOTAL NUCLEATED RBC: 0 K/CU MM
TOTAL PROTEIN: 4.7 GM/DL (ref 6.4–8.2)
TOTAL PROTEIN: 4.8 GM/DL (ref 6.4–8.2)
TOTAL PROTEIN: 5.4 GM/DL (ref 6.4–8.2)
WBC # BLD: 18.6 K/CU MM (ref 4–10.5)

## 2024-01-19 PROCEDURE — 6360000002 HC RX W HCPCS: Performed by: STUDENT IN AN ORGANIZED HEALTH CARE EDUCATION/TRAINING PROGRAM

## 2024-01-19 PROCEDURE — 36600 WITHDRAWAL OF ARTERIAL BLOOD: CPT

## 2024-01-19 PROCEDURE — 2580000003 HC RX 258: Performed by: STUDENT IN AN ORGANIZED HEALTH CARE EDUCATION/TRAINING PROGRAM

## 2024-01-19 PROCEDURE — 82330 ASSAY OF CALCIUM: CPT

## 2024-01-19 PROCEDURE — 87077 CULTURE AEROBIC IDENTIFY: CPT

## 2024-01-19 PROCEDURE — 94003 VENT MGMT INPAT SUBQ DAY: CPT

## 2024-01-19 PROCEDURE — 6370000000 HC RX 637 (ALT 250 FOR IP): Performed by: STUDENT IN AN ORGANIZED HEALTH CARE EDUCATION/TRAINING PROGRAM

## 2024-01-19 PROCEDURE — 84100 ASSAY OF PHOSPHORUS: CPT

## 2024-01-19 PROCEDURE — 83735 ASSAY OF MAGNESIUM: CPT

## 2024-01-19 PROCEDURE — 82248 BILIRUBIN DIRECT: CPT

## 2024-01-19 PROCEDURE — 6370000000 HC RX 637 (ALT 250 FOR IP): Performed by: SPECIALIST

## 2024-01-19 PROCEDURE — 85730 THROMBOPLASTIN TIME PARTIAL: CPT

## 2024-01-19 PROCEDURE — 87449 NOS EACH ORGANISM AG IA: CPT

## 2024-01-19 PROCEDURE — 87899 AGENT NOS ASSAY W/OPTIC: CPT

## 2024-01-19 PROCEDURE — 85610 PROTHROMBIN TIME: CPT

## 2024-01-19 PROCEDURE — 87186 SC STD MICRODIL/AGAR DIL: CPT

## 2024-01-19 PROCEDURE — 85379 FIBRIN DEGRADATION QUANT: CPT

## 2024-01-19 PROCEDURE — 83605 ASSAY OF LACTIC ACID: CPT

## 2024-01-19 PROCEDURE — 80048 BASIC METABOLIC PNL TOTAL CA: CPT

## 2024-01-19 PROCEDURE — 2700000000 HC OXYGEN THERAPY PER DAY

## 2024-01-19 PROCEDURE — 93010 ELECTROCARDIOGRAM REPORT: CPT | Performed by: INTERNAL MEDICINE

## 2024-01-19 PROCEDURE — 2500000003 HC RX 250 WO HCPCS: Performed by: NURSE PRACTITIONER

## 2024-01-19 PROCEDURE — 85384 FIBRINOGEN ACTIVITY: CPT

## 2024-01-19 PROCEDURE — 87205 SMEAR GRAM STAIN: CPT

## 2024-01-19 PROCEDURE — 71045 X-RAY EXAM CHEST 1 VIEW: CPT

## 2024-01-19 PROCEDURE — C9113 INJ PANTOPRAZOLE SODIUM, VIA: HCPCS | Performed by: STUDENT IN AN ORGANIZED HEALTH CARE EDUCATION/TRAINING PROGRAM

## 2024-01-19 PROCEDURE — 94761 N-INVAS EAR/PLS OXIMETRY MLT: CPT

## 2024-01-19 PROCEDURE — 6360000002 HC RX W HCPCS: Performed by: NURSE PRACTITIONER

## 2024-01-19 PROCEDURE — 2000000000 HC ICU R&B

## 2024-01-19 PROCEDURE — 6370000000 HC RX 637 (ALT 250 FOR IP): Performed by: NURSE PRACTITIONER

## 2024-01-19 PROCEDURE — 86738 MYCOPLASMA ANTIBODY: CPT

## 2024-01-19 PROCEDURE — 80053 COMPREHEN METABOLIC PANEL: CPT

## 2024-01-19 PROCEDURE — 82803 BLOOD GASES ANY COMBINATION: CPT

## 2024-01-19 PROCEDURE — 87641 MR-STAPH DNA AMP PROBE: CPT

## 2024-01-19 PROCEDURE — 87070 CULTURE OTHR SPECIMN AEROBIC: CPT

## 2024-01-19 PROCEDURE — 89220 SPUTUM SPECIMEN COLLECTION: CPT

## 2024-01-19 PROCEDURE — 85025 COMPLETE CBC W/AUTO DIFF WBC: CPT

## 2024-01-19 RX ORDER — PANTOPRAZOLE SODIUM 40 MG/10ML
40 INJECTION, POWDER, LYOPHILIZED, FOR SOLUTION INTRAVENOUS DAILY
Status: DISCONTINUED | OUTPATIENT
Start: 2024-01-19 | End: 2024-01-22 | Stop reason: HOSPADM

## 2024-01-19 RX ORDER — ACETAMINOPHEN 160 MG/5ML
650 LIQUID ORAL EVERY 6 HOURS SCHEDULED
Status: DISCONTINUED | OUTPATIENT
Start: 2024-01-19 | End: 2024-01-22 | Stop reason: HOSPADM

## 2024-01-19 RX ADMIN — DOCUSATE SODIUM LIQUID 100 MG: 100 LIQUID ORAL at 20:31

## 2024-01-19 RX ADMIN — POLYETHYLENE GLYCOL (3350) 17 G: 17 POWDER, FOR SOLUTION ORAL at 20:31

## 2024-01-19 RX ADMIN — PIPERACILLIN AND TAZOBACTAM 3375 MG: 3; .375 INJECTION, POWDER, FOR SOLUTION INTRAVENOUS at 06:51

## 2024-01-19 RX ADMIN — PANTOPRAZOLE SODIUM 40 MG: 40 INJECTION, POWDER, FOR SOLUTION INTRAVENOUS at 09:38

## 2024-01-19 RX ADMIN — PROPOFOL 20 MCG/KG/MIN: 10 INJECTION, EMULSION INTRAVENOUS at 00:25

## 2024-01-19 RX ADMIN — POLYETHYLENE GLYCOL (3350) 17 G: 17 POWDER, FOR SOLUTION ORAL at 01:15

## 2024-01-19 RX ADMIN — SODIUM CHLORIDE, PRESERVATIVE FREE 10 ML: 5 INJECTION INTRAVENOUS at 07:19

## 2024-01-19 RX ADMIN — SODIUM CHLORIDE, PRESERVATIVE FREE 10 ML: 5 INJECTION INTRAVENOUS at 20:31

## 2024-01-19 RX ADMIN — NOREPINEPHRINE BITARTRATE 6 MCG/MIN: 0.06 INJECTION, SOLUTION INTRAVENOUS at 19:01

## 2024-01-19 RX ADMIN — ACETAMINOPHEN 650 MG: 650 SOLUTION ORAL at 18:14

## 2024-01-19 RX ADMIN — POLYETHYLENE GLYCOL (3350) 17 G: 17 POWDER, FOR SOLUTION ORAL at 09:39

## 2024-01-19 RX ADMIN — CHLORHEXIDINE GLUCONATE 0.12% ORAL RINSE 15 ML: 1.2 LIQUID ORAL at 20:31

## 2024-01-19 RX ADMIN — DOCUSATE SODIUM 100 MG: 100 CAPSULE, LIQUID FILLED ORAL at 09:39

## 2024-01-19 RX ADMIN — ATORVASTATIN CALCIUM 40 MG: 40 TABLET, FILM COATED ORAL at 09:38

## 2024-01-19 RX ADMIN — PIPERACILLIN AND TAZOBACTAM 3375 MG: 3; .375 INJECTION, POWDER, FOR SOLUTION INTRAVENOUS at 20:36

## 2024-01-19 RX ADMIN — LEVOTHYROXINE SODIUM 125 MCG: 0.12 TABLET ORAL at 06:33

## 2024-01-19 RX ADMIN — CHLORHEXIDINE GLUCONATE 0.12% ORAL RINSE 15 ML: 1.2 LIQUID ORAL at 07:19

## 2024-01-19 RX ADMIN — ACETAMINOPHEN 650 MG: 650 SOLUTION ORAL at 11:35

## 2024-01-19 RX ADMIN — RIVAROXABAN 20 MG: 20 TABLET, FILM COATED ORAL at 09:39

## 2024-01-19 RX ADMIN — PIPERACILLIN AND TAZOBACTAM 3375 MG: 3; .375 INJECTION, POWDER, FOR SOLUTION INTRAVENOUS at 15:30

## 2024-01-19 RX ADMIN — ACETAMINOPHEN 650 MG: 650 SOLUTION ORAL at 06:33

## 2024-01-19 ASSESSMENT — PULMONARY FUNCTION TESTS
PIF_VALUE: 20
PIF_VALUE: 22
PIF_VALUE: 25
PIF_VALUE: 19
PIF_VALUE: 25
PIF_VALUE: 26
PIF_VALUE: 20
PIF_VALUE: 19
PIF_VALUE: 23
PIF_VALUE: 19
PIF_VALUE: 25
PIF_VALUE: 22
PIF_VALUE: 29
PIF_VALUE: 18
PIF_VALUE: 23
PIF_VALUE: 21
PIF_VALUE: 20
PIF_VALUE: 21
PIF_VALUE: 25
PIF_VALUE: 20
PIF_VALUE: 23
PIF_VALUE: 22
PIF_VALUE: 20
PIF_VALUE: 22
PIF_VALUE: 19
PIF_VALUE: 20
PIF_VALUE: 21
PIF_VALUE: 20
PIF_VALUE: 27
PIF_VALUE: 22
PIF_VALUE: 23
PIF_VALUE: 20
PIF_VALUE: 23
PIF_VALUE: 21
PIF_VALUE: 22
PIF_VALUE: 19
PIF_VALUE: 22
PIF_VALUE: 20
PIF_VALUE: 27
PIF_VALUE: 20
PIF_VALUE: 20
PIF_VALUE: 24
PIF_VALUE: 19
PIF_VALUE: 21
PIF_VALUE: 26
PIF_VALUE: 19
PIF_VALUE: 20
PIF_VALUE: 26
PIF_VALUE: 18
PIF_VALUE: 22
PIF_VALUE: 25
PIF_VALUE: 24
PIF_VALUE: 20
PIF_VALUE: 19
PIF_VALUE: 20
PIF_VALUE: 23
PIF_VALUE: 22
PIF_VALUE: 24
PIF_VALUE: 21

## 2024-01-19 NOTE — PLAN OF CARE
Problem: Discharge Planning  Goal: Discharge to home or other facility with appropriate resources  1/19/2024 0643 by Krysten Rose RN  Outcome: Progressing  1/18/2024 1728 by Rama Perez RN  Outcome: Progressing     Problem: Safety - Adult  Goal: Free from fall injury  1/19/2024 0643 by Krysten Rose RN  Outcome: Progressing  1/18/2024 1728 by Rama Perez RN  Outcome: Progressing     Problem: Skin/Tissue Integrity  Goal: Absence of new skin breakdown  Description: 1.  Monitor for areas of redness and/or skin breakdown  2.  Assess vascular access sites hourly  3.  Every 4-6 hours minimum:  Change oxygen saturation probe site  4.  Every 4-6 hours:  If on nasal continuous positive airway pressure, respiratory therapy assess nares and determine need for appliance change or resting period.  1/19/2024 0643 by Krysten Rose RN  Outcome: Progressing  1/18/2024 1728 by Rama Perez RN  Outcome: Progressing     Problem: Nutrition Deficit:  Goal: Optimize nutritional status  Outcome: Progressing     Problem: ABCDS Injury Assessment  Goal: Absence of physical injury  Outcome: Progressing

## 2024-01-19 NOTE — CARE COORDINATION
Cm contacted Yasmine Robins as she was initially listed as legal guardian but she is not pt's guardian. [employer] is pt's legal guardian. Yasmine did provide the following information. Pt lives in a home operated by [employer]. Pt has 2 roommates. Pt generally attends a day program M-F. Prior to admission pt could walk self to kitchen, feed self etc although staff notes pt has been experiencing a decline. Pt has been to ER 2 x prior to this admit. Yasmine advises that if pt is not ambulatory/ is bed confined at discharge he will not be able to return to [facility] and will require SNF placement. Cm will follow for post extubation needs.   01/19/24 9007   Service Assessment   Patient Orientation Unable to Assess  (Intubated and on the vent)   Cognition Other (see comment)  (intubated and on the vent)   History Provided By Other (see comment);Medical Record  (medical coordinator/advocate for [facility])   Primary Caregiver Other (Comment)  ([facility] staff)   Accompanied By/Relationship N/A   Support Systems Other (Comment)  ([employer] is legal guardian, [facility] advocate/medical coordinator and staff)   Patient's Healthcare Decision Maker is: Named in Scanned ACP Document  (legal guardian--[employer])   PCP Verified by CM Yes   Last Visit to PCP   (unknown)   Prior Functional Level Assistance with the following:;Cooking;Housework;Shopping   Current Functional Level Assistance with the following:  (requires total care at present)   Can patient return to prior living arrangement Unknown at present   Ability to make needs known: Unable   Family able to assist with home care needs: Yes  ([facility] staff)   Would you like for me to discuss the discharge plan with any other family members/significant others, and if so, who? Yes  (Legal Guardian)   Financial Resources Medicaid   Community Resources Other (Comment)  ([facility])

## 2024-01-20 LAB
ALBUMIN SERPL-MCNC: 2.1 GM/DL (ref 3.4–5)
ALBUMIN SERPL-MCNC: 2.3 GM/DL (ref 3.4–5)
ALBUMIN SERPL-MCNC: 2.4 GM/DL (ref 3.4–5)
ALP BLD-CCNC: 61 IU/L (ref 40–129)
ALP BLD-CCNC: 62 IU/L (ref 40–129)
ALP BLD-CCNC: 74 IU/L (ref 40–129)
ALT SERPL-CCNC: 25 U/L (ref 10–40)
ALT SERPL-CCNC: 25 U/L (ref 10–40)
ALT SERPL-CCNC: 26 U/L (ref 10–40)
ANION GAP SERPL CALCULATED.3IONS-SCNC: 7 MMOL/L (ref 7–16)
ANION GAP SERPL CALCULATED.3IONS-SCNC: 7 MMOL/L (ref 7–16)
ANION GAP SERPL CALCULATED.3IONS-SCNC: 8 MMOL/L (ref 7–16)
ANION GAP SERPL CALCULATED.3IONS-SCNC: 8 MMOL/L (ref 7–16)
APTT: 41.5 SECONDS (ref 25.1–37.1)
AST SERPL-CCNC: 19 IU/L (ref 15–37)
AST SERPL-CCNC: 21 IU/L (ref 15–37)
AST SERPL-CCNC: 24 IU/L (ref 15–37)
BASE EXCESS MIXED: 3.7 (ref 0–3)
BASOPHILS ABSOLUTE: 0 K/CU MM
BASOPHILS RELATIVE PERCENT: 0.1 % (ref 0–1)
BILIRUB SERPL-MCNC: 0.3 MG/DL (ref 0–1)
BILIRUB SERPL-MCNC: 0.3 MG/DL (ref 0–1)
BILIRUB SERPL-MCNC: 0.4 MG/DL (ref 0–1)
BILIRUBIN DIRECT: 0.2 MG/DL (ref 0–0.3)
BILIRUBIN DIRECT: 0.2 MG/DL (ref 0–0.3)
BILIRUBIN DIRECT: 0.3 MG/DL (ref 0–0.3)
BILIRUBIN, INDIRECT: 0.1 MG/DL (ref 0–0.7)
BUN SERPL-MCNC: 14 MG/DL (ref 6–23)
BUN SERPL-MCNC: 15 MG/DL (ref 6–23)
BUN SERPL-MCNC: 16 MG/DL (ref 6–23)
BUN SERPL-MCNC: 17 MG/DL (ref 6–23)
CALCIUM IONIZED: 4.44 MG/DL (ref 4.48–5.28)
CALCIUM IONIZED: 4.52 MG/DL (ref 4.48–5.28)
CALCIUM IONIZED: 4.52 MG/DL (ref 4.48–5.28)
CALCIUM SERPL-MCNC: 7 MG/DL (ref 8.3–10.6)
CALCIUM SERPL-MCNC: 7 MG/DL (ref 8.3–10.6)
CALCIUM SERPL-MCNC: 7.1 MG/DL (ref 8.3–10.6)
CALCIUM SERPL-MCNC: 7.1 MG/DL (ref 8.3–10.6)
CARBON MONOXIDE, BLOOD: 1.9 % (ref 0–5)
CHLORIDE BLD-SCNC: 105 MMOL/L (ref 99–110)
CHLORIDE BLD-SCNC: 105 MMOL/L (ref 99–110)
CHLORIDE BLD-SCNC: 106 MMOL/L (ref 99–110)
CHLORIDE BLD-SCNC: 107 MMOL/L (ref 99–110)
CO2 CONTENT: 29.8 MMOL/L (ref 21–32)
CO2: 26 MMOL/L (ref 21–32)
CO2: 26 MMOL/L (ref 21–32)
CO2: 28 MMOL/L (ref 21–32)
CO2: 28 MMOL/L (ref 21–32)
COMMENT: ABNORMAL
CREAT SERPL-MCNC: 0.5 MG/DL (ref 0.9–1.3)
CREAT SERPL-MCNC: 0.6 MG/DL (ref 0.9–1.3)
D DIMER: 0.77 UG/ML (FEU)
DIFFERENTIAL TYPE: ABNORMAL
EOSINOPHILS ABSOLUTE: 0 K/CU MM
EOSINOPHILS RELATIVE PERCENT: 0.2 % (ref 0–3)
FIBRINOGEN LEVEL: 1063 MG/DL (ref 170–540)
GFR SERPL CREATININE-BSD FRML MDRD: >60 ML/MIN/1.73M2
GLUCOSE SERPL-MCNC: 112 MG/DL (ref 70–99)
GLUCOSE SERPL-MCNC: 125 MG/DL (ref 70–99)
GLUCOSE SERPL-MCNC: 129 MG/DL (ref 70–99)
GLUCOSE SERPL-MCNC: 133 MG/DL (ref 70–99)
HCO3 ARTERIAL: 28.5 MMOL/L (ref 21–28)
HCT VFR BLD CALC: 29.1 % (ref 42–52)
HEMOGLOBIN: 9.4 GM/DL (ref 13.5–18)
IMMATURE NEUTROPHIL %: 0.9 % (ref 0–0.43)
INR BLD: 2.2 INDEX
IONIZED CA: 1.11 MMOL/L (ref 1.12–1.32)
IONIZED CA: 1.13 MMOL/L (ref 1.12–1.32)
IONIZED CA: 1.13 MMOL/L (ref 1.12–1.32)
LACTATE: 1.1 MMOL/L (ref 0.5–1.9)
LACTATE: 1.2 MMOL/L (ref 0.5–1.9)
LACTATE: 1.3 MMOL/L (ref 0.5–1.9)
LYMPHOCYTES ABSOLUTE: 0.5 K/CU MM
LYMPHOCYTES RELATIVE PERCENT: 3 % (ref 24–44)
MAGNESIUM: 2.4 MG/DL (ref 1.8–2.4)
MAGNESIUM: 2.5 MG/DL (ref 1.8–2.4)
MAGNESIUM: 2.5 MG/DL (ref 1.8–2.4)
MAGNESIUM: 2.8 MG/DL (ref 1.8–2.4)
MCH RBC QN AUTO: 32 PG (ref 27–31)
MCHC RBC AUTO-ENTMCNC: 32.3 % (ref 32–36)
MCV RBC AUTO: 99 FL (ref 78–100)
METHEMOGLOBIN ARTERIAL: 1 %
MONOCYTES ABSOLUTE: 0.4 K/CU MM
MONOCYTES RELATIVE PERCENT: 2.5 % (ref 0–4)
NUCLEATED RBC %: 0.1 %
O2 SATURATION: 94.3 % (ref 94–98)
PCO2 ARTERIAL: 43 MMHG (ref 35–48)
PDW BLD-RTO: 15.7 % (ref 11.7–14.9)
PH BLOOD: 7.43 (ref 7.35–7.45)
PHOSPHORUS: 1.4 MG/DL (ref 2.5–4.9)
PHOSPHORUS: 1.8 MG/DL (ref 2.5–4.9)
PHOSPHORUS: 1.8 MG/DL (ref 2.5–4.9)
PHOSPHORUS: 1.9 MG/DL (ref 2.5–4.9)
PLATELET # BLD: 452 K/CU MM (ref 140–440)
PMV BLD AUTO: 9.5 FL (ref 7.5–11.1)
PO2 ARTERIAL: 86 MMHG (ref 83–108)
POTASSIUM SERPL-SCNC: 3.7 MMOL/L (ref 3.5–5.1)
POTASSIUM SERPL-SCNC: 3.8 MMOL/L (ref 3.5–5.1)
POTASSIUM SERPL-SCNC: 4 MMOL/L (ref 3.5–5.1)
POTASSIUM SERPL-SCNC: 4.1 MMOL/L (ref 3.5–5.1)
PROTHROMBIN TIME: 25 SECONDS (ref 11.7–14.5)
RBC # BLD: 2.94 M/CU MM (ref 4.6–6.2)
SEGMENTED NEUTROPHILS ABSOLUTE COUNT: 14.5 K/CU MM
SEGMENTED NEUTROPHILS RELATIVE PERCENT: 93.3 % (ref 36–66)
SODIUM BLD-SCNC: 140 MMOL/L (ref 135–145)
SODIUM BLD-SCNC: 141 MMOL/L (ref 135–145)
TOTAL IMMATURE NEUTOROPHIL: 0.14 K/CU MM
TOTAL NUCLEATED RBC: 0 K/CU MM
TOTAL PROTEIN: 4.7 GM/DL (ref 6.4–8.2)
TOTAL PROTEIN: 4.8 GM/DL (ref 6.4–8.2)
TOTAL PROTEIN: 4.8 GM/DL (ref 6.4–8.2)
WBC # BLD: 15.5 K/CU MM (ref 4–10.5)

## 2024-01-20 PROCEDURE — 82248 BILIRUBIN DIRECT: CPT

## 2024-01-20 PROCEDURE — 82330 ASSAY OF CALCIUM: CPT

## 2024-01-20 PROCEDURE — 89220 SPUTUM SPECIMEN COLLECTION: CPT

## 2024-01-20 PROCEDURE — 80048 BASIC METABOLIC PNL TOTAL CA: CPT

## 2024-01-20 PROCEDURE — 6370000000 HC RX 637 (ALT 250 FOR IP): Performed by: SPECIALIST

## 2024-01-20 PROCEDURE — 2580000003 HC RX 258: Performed by: STUDENT IN AN ORGANIZED HEALTH CARE EDUCATION/TRAINING PROGRAM

## 2024-01-20 PROCEDURE — 83735 ASSAY OF MAGNESIUM: CPT

## 2024-01-20 PROCEDURE — 6360000002 HC RX W HCPCS: Performed by: STUDENT IN AN ORGANIZED HEALTH CARE EDUCATION/TRAINING PROGRAM

## 2024-01-20 PROCEDURE — C9113 INJ PANTOPRAZOLE SODIUM, VIA: HCPCS | Performed by: STUDENT IN AN ORGANIZED HEALTH CARE EDUCATION/TRAINING PROGRAM

## 2024-01-20 PROCEDURE — 85379 FIBRIN DEGRADATION QUANT: CPT

## 2024-01-20 PROCEDURE — 80053 COMPREHEN METABOLIC PANEL: CPT

## 2024-01-20 PROCEDURE — 2000000000 HC ICU R&B

## 2024-01-20 PROCEDURE — 85384 FIBRINOGEN ACTIVITY: CPT

## 2024-01-20 PROCEDURE — 6370000000 HC RX 637 (ALT 250 FOR IP): Performed by: NURSE PRACTITIONER

## 2024-01-20 PROCEDURE — 85025 COMPLETE CBC W/AUTO DIFF WBC: CPT

## 2024-01-20 PROCEDURE — 85730 THROMBOPLASTIN TIME PARTIAL: CPT

## 2024-01-20 PROCEDURE — 85610 PROTHROMBIN TIME: CPT

## 2024-01-20 PROCEDURE — 6360000002 HC RX W HCPCS: Performed by: NURSE PRACTITIONER

## 2024-01-20 PROCEDURE — 36600 WITHDRAWAL OF ARTERIAL BLOOD: CPT

## 2024-01-20 PROCEDURE — 84100 ASSAY OF PHOSPHORUS: CPT

## 2024-01-20 PROCEDURE — 83605 ASSAY OF LACTIC ACID: CPT

## 2024-01-20 PROCEDURE — 94003 VENT MGMT INPAT SUBQ DAY: CPT

## 2024-01-20 PROCEDURE — 6370000000 HC RX 637 (ALT 250 FOR IP): Performed by: STUDENT IN AN ORGANIZED HEALTH CARE EDUCATION/TRAINING PROGRAM

## 2024-01-20 PROCEDURE — 82803 BLOOD GASES ANY COMBINATION: CPT

## 2024-01-20 RX ADMIN — DOCUSATE SODIUM LIQUID 100 MG: 100 LIQUID ORAL at 07:54

## 2024-01-20 RX ADMIN — ACETAMINOPHEN 650 MG: 650 SOLUTION ORAL at 12:13

## 2024-01-20 RX ADMIN — FENTANYL CITRATE 50 MCG: 50 INJECTION, SOLUTION INTRAMUSCULAR; INTRAVENOUS at 04:51

## 2024-01-20 RX ADMIN — POLYETHYLENE GLYCOL (3350) 17 G: 17 POWDER, FOR SOLUTION ORAL at 20:56

## 2024-01-20 RX ADMIN — MIDAZOLAM 2 MG: 1 INJECTION INTRAMUSCULAR; INTRAVENOUS at 03:48

## 2024-01-20 RX ADMIN — PANTOPRAZOLE SODIUM 40 MG: 40 INJECTION, POWDER, FOR SOLUTION INTRAVENOUS at 07:54

## 2024-01-20 RX ADMIN — DOCUSATE SODIUM LIQUID 100 MG: 100 LIQUID ORAL at 20:56

## 2024-01-20 RX ADMIN — SODIUM CHLORIDE, PRESERVATIVE FREE 10 ML: 5 INJECTION INTRAVENOUS at 20:59

## 2024-01-20 RX ADMIN — SODIUM CHLORIDE, PRESERVATIVE FREE 10 ML: 5 INJECTION INTRAVENOUS at 08:11

## 2024-01-20 RX ADMIN — LEVOTHYROXINE SODIUM 125 MCG: 0.12 TABLET ORAL at 06:57

## 2024-01-20 RX ADMIN — PIPERACILLIN AND TAZOBACTAM 3375 MG: 3; .375 INJECTION, POWDER, FOR SOLUTION INTRAVENOUS at 20:58

## 2024-01-20 RX ADMIN — CHLORHEXIDINE GLUCONATE 0.12% ORAL RINSE 15 ML: 1.2 LIQUID ORAL at 07:54

## 2024-01-20 RX ADMIN — ACETAMINOPHEN 650 MG: 650 SOLUTION ORAL at 18:49

## 2024-01-20 RX ADMIN — ACETAMINOPHEN 650 MG: 650 SOLUTION ORAL at 00:22

## 2024-01-20 RX ADMIN — CHLORHEXIDINE GLUCONATE 0.12% ORAL RINSE 15 ML: 1.2 LIQUID ORAL at 20:56

## 2024-01-20 RX ADMIN — ATORVASTATIN CALCIUM 40 MG: 40 TABLET, FILM COATED ORAL at 07:54

## 2024-01-20 RX ADMIN — RIVAROXABAN 20 MG: 20 TABLET, FILM COATED ORAL at 07:54

## 2024-01-20 RX ADMIN — ACETAMINOPHEN 650 MG: 650 SOLUTION ORAL at 06:57

## 2024-01-20 RX ADMIN — POLYETHYLENE GLYCOL (3350) 17 G: 17 POWDER, FOR SOLUTION ORAL at 07:54

## 2024-01-20 RX ADMIN — PIPERACILLIN AND TAZOBACTAM 3375 MG: 3; .375 INJECTION, POWDER, FOR SOLUTION INTRAVENOUS at 14:33

## 2024-01-20 RX ADMIN — PIPERACILLIN AND TAZOBACTAM 3375 MG: 3; .375 INJECTION, POWDER, FOR SOLUTION INTRAVENOUS at 06:58

## 2024-01-20 ASSESSMENT — PULMONARY FUNCTION TESTS
PIF_VALUE: 17
PIF_VALUE: 20
PIF_VALUE: 20
PIF_VALUE: 19
PIF_VALUE: 20
PIF_VALUE: 22
PIF_VALUE: 22
PIF_VALUE: 28
PIF_VALUE: 19
PIF_VALUE: 20
PIF_VALUE: 20
PIF_VALUE: 17
PIF_VALUE: 25
PIF_VALUE: 27
PIF_VALUE: 19
PIF_VALUE: 17
PIF_VALUE: 19
PIF_VALUE: 19
PIF_VALUE: 20
PIF_VALUE: 18
PIF_VALUE: 24
PIF_VALUE: 19
PIF_VALUE: 20
PIF_VALUE: 18
PIF_VALUE: 19
PIF_VALUE: 20
PIF_VALUE: 20

## 2024-01-20 NOTE — PLAN OF CARE
Problem: Discharge Planning  Goal: Discharge to home or other facility with appropriate resources  Outcome: Progressing     Problem: Safety - Adult  Goal: Free from fall injury  Outcome: Progressing     Problem: Skin/Tissue Integrity  Goal: Absence of new skin breakdown  Description: 1.  Monitor for areas of redness and/or skin breakdown  2.  Assess vascular access sites hourly  3.  Every 4-6 hours minimum:  Change oxygen saturation probe site  4.  Every 4-6 hours:  If on nasal continuous positive airway pressure, respiratory therapy assess nares and determine need for appliance change or resting period.  Outcome: Progressing     Problem: Nutrition Deficit:  Goal: Optimize nutritional status  Outcome: Progressing     Problem: ABCDS Injury Assessment  Goal: Absence of physical injury  Outcome: Progressing     Problem: Pain  Goal: Verbalizes/displays adequate comfort level or baseline comfort level  Outcome: Progressing

## 2024-01-21 ENCOUNTER — APPOINTMENT (OUTPATIENT)
Dept: MRI IMAGING | Age: 59
DRG: 133 | End: 2024-01-21
Payer: MEDICAID

## 2024-01-21 ENCOUNTER — APPOINTMENT (OUTPATIENT)
Dept: CT IMAGING | Age: 59
DRG: 133 | End: 2024-01-21
Payer: MEDICAID

## 2024-01-21 LAB
ANION GAP SERPL CALCULATED.3IONS-SCNC: 6 MMOL/L (ref 7–16)
ANION GAP SERPL CALCULATED.3IONS-SCNC: 7 MMOL/L (ref 7–16)
ANION GAP SERPL CALCULATED.3IONS-SCNC: 7 MMOL/L (ref 7–16)
BASOPHILS ABSOLUTE: 0 K/CU MM
BASOPHILS RELATIVE PERCENT: 0.1 % (ref 0–1)
BUN SERPL-MCNC: 13 MG/DL (ref 6–23)
BUN SERPL-MCNC: 14 MG/DL (ref 6–23)
BUN SERPL-MCNC: 14 MG/DL (ref 6–23)
CALCIUM SERPL-MCNC: 6.8 MG/DL (ref 8.3–10.6)
CALCIUM SERPL-MCNC: 6.9 MG/DL (ref 8.3–10.6)
CALCIUM SERPL-MCNC: 6.9 MG/DL (ref 8.3–10.6)
CHLORIDE BLD-SCNC: 102 MMOL/L (ref 99–110)
CHLORIDE BLD-SCNC: 105 MMOL/L (ref 99–110)
CHLORIDE BLD-SCNC: 107 MMOL/L (ref 99–110)
CO2: 28 MMOL/L (ref 21–32)
CO2: 29 MMOL/L (ref 21–32)
CO2: 30 MMOL/L (ref 21–32)
CREAT SERPL-MCNC: 0.4 MG/DL (ref 0.9–1.3)
CREAT SERPL-MCNC: 0.6 MG/DL (ref 0.9–1.3)
CREAT SERPL-MCNC: 0.6 MG/DL (ref 0.9–1.3)
DIFFERENTIAL TYPE: ABNORMAL
EKG ATRIAL RATE: 35 BPM
EKG DIAGNOSIS: NORMAL
EKG P AXIS: 69 DEGREES
EKG P-R INTERVAL: 146 MS
EKG Q-T INTERVAL: 558 MS
EKG QRS DURATION: 90 MS
EKG QTC CALCULATION (BAZETT): 425 MS
EKG R AXIS: 24 DEGREES
EKG T AXIS: 34 DEGREES
EKG VENTRICULAR RATE: 35 BPM
EOSINOPHILS ABSOLUTE: 0.1 K/CU MM
EOSINOPHILS RELATIVE PERCENT: 0.5 % (ref 0–3)
GFR SERPL CREATININE-BSD FRML MDRD: >60 ML/MIN/1.73M2
GLUCOSE SERPL-MCNC: 116 MG/DL (ref 70–99)
GLUCOSE SERPL-MCNC: 122 MG/DL (ref 70–99)
GLUCOSE SERPL-MCNC: 90 MG/DL (ref 70–99)
HCT VFR BLD CALC: 28.9 % (ref 42–52)
HEMOGLOBIN: 9.3 GM/DL (ref 13.5–18)
IMMATURE NEUTROPHIL %: 0.6 % (ref 0–0.43)
LYMPHOCYTES ABSOLUTE: 0.4 K/CU MM
LYMPHOCYTES RELATIVE PERCENT: 3.2 % (ref 24–44)
MAGNESIUM: 2.5 MG/DL (ref 1.8–2.4)
MAGNESIUM: 2.6 MG/DL (ref 1.8–2.4)
MAGNESIUM: 2.7 MG/DL (ref 1.8–2.4)
MCH RBC QN AUTO: 31.6 PG (ref 27–31)
MCHC RBC AUTO-ENTMCNC: 32.2 % (ref 32–36)
MCV RBC AUTO: 98.3 FL (ref 78–100)
MONOCYTES ABSOLUTE: 0.5 K/CU MM
MONOCYTES RELATIVE PERCENT: 3.7 % (ref 0–4)
NUCLEATED RBC %: 0.3 %
PDW BLD-RTO: 15.8 % (ref 11.7–14.9)
PHOSPHORUS: 1.7 MG/DL (ref 2.5–4.9)
PHOSPHORUS: 2.1 MG/DL (ref 2.5–4.9)
PHOSPHORUS: 3.1 MG/DL (ref 2.5–4.9)
PLATELET # BLD: 412 K/CU MM (ref 140–440)
PMV BLD AUTO: 9.2 FL (ref 7.5–11.1)
POTASSIUM SERPL-SCNC: 3.9 MMOL/L (ref 3.5–5.1)
POTASSIUM SERPL-SCNC: 4 MMOL/L (ref 3.5–5.1)
POTASSIUM SERPL-SCNC: 4.4 MMOL/L (ref 3.5–5.1)
RBC # BLD: 2.94 M/CU MM (ref 4.6–6.2)
SEGMENTED NEUTROPHILS ABSOLUTE COUNT: 11.4 K/CU MM
SEGMENTED NEUTROPHILS RELATIVE PERCENT: 91.9 % (ref 36–66)
SODIUM BLD-SCNC: 139 MMOL/L (ref 135–145)
SODIUM BLD-SCNC: 140 MMOL/L (ref 135–145)
SODIUM BLD-SCNC: 142 MMOL/L (ref 135–145)
TOTAL IMMATURE NEUTOROPHIL: 0.08 K/CU MM
TOTAL NUCLEATED RBC: 0 K/CU MM
WBC # BLD: 12.4 K/CU MM (ref 4–10.5)

## 2024-01-21 PROCEDURE — 70450 CT HEAD/BRAIN W/O DYE: CPT

## 2024-01-21 PROCEDURE — 83735 ASSAY OF MAGNESIUM: CPT

## 2024-01-21 PROCEDURE — 6360000002 HC RX W HCPCS

## 2024-01-21 PROCEDURE — 94003 VENT MGMT INPAT SUBQ DAY: CPT

## 2024-01-21 PROCEDURE — 6360000002 HC RX W HCPCS: Performed by: STUDENT IN AN ORGANIZED HEALTH CARE EDUCATION/TRAINING PROGRAM

## 2024-01-21 PROCEDURE — 6360000004 HC RX CONTRAST MEDICATION: Performed by: PHYSICIAN ASSISTANT

## 2024-01-21 PROCEDURE — 89220 SPUTUM SPECIMEN COLLECTION: CPT

## 2024-01-21 PROCEDURE — A9579 GAD-BASE MR CONTRAST NOS,1ML: HCPCS | Performed by: PHYSICIAN ASSISTANT

## 2024-01-21 PROCEDURE — C9113 INJ PANTOPRAZOLE SODIUM, VIA: HCPCS | Performed by: STUDENT IN AN ORGANIZED HEALTH CARE EDUCATION/TRAINING PROGRAM

## 2024-01-21 PROCEDURE — 6370000000 HC RX 637 (ALT 250 FOR IP): Performed by: STUDENT IN AN ORGANIZED HEALTH CARE EDUCATION/TRAINING PROGRAM

## 2024-01-21 PROCEDURE — 6370000000 HC RX 637 (ALT 250 FOR IP): Performed by: NURSE PRACTITIONER

## 2024-01-21 PROCEDURE — 6370000000 HC RX 637 (ALT 250 FOR IP): Performed by: SPECIALIST

## 2024-01-21 PROCEDURE — 31500 INSERT EMERGENCY AIRWAY: CPT

## 2024-01-21 PROCEDURE — 2000000000 HC ICU R&B

## 2024-01-21 PROCEDURE — 72156 MRI NECK SPINE W/O & W/DYE: CPT

## 2024-01-21 PROCEDURE — 85025 COMPLETE CBC W/AUTO DIFF WBC: CPT

## 2024-01-21 PROCEDURE — 93005 ELECTROCARDIOGRAM TRACING: CPT | Performed by: SPECIALIST

## 2024-01-21 PROCEDURE — 84100 ASSAY OF PHOSPHORUS: CPT

## 2024-01-21 PROCEDURE — 93010 ELECTROCARDIOGRAM REPORT: CPT | Performed by: INTERNAL MEDICINE

## 2024-01-21 PROCEDURE — 72126 CT NECK SPINE W/DYE: CPT

## 2024-01-21 PROCEDURE — 80048 BASIC METABOLIC PNL TOTAL CA: CPT

## 2024-01-21 PROCEDURE — 2580000003 HC RX 258: Performed by: STUDENT IN AN ORGANIZED HEALTH CARE EDUCATION/TRAINING PROGRAM

## 2024-01-21 RX ORDER — PHENYLEPHRINE HYDROCHLORIDE 10 MG/ML
INJECTION INTRAVENOUS
Status: DISCONTINUED
Start: 2024-01-21 | End: 2024-01-22 | Stop reason: HOSPADM

## 2024-01-21 RX ORDER — PROPOFOL 10 MG/ML
INJECTION, EMULSION INTRAVENOUS
Status: COMPLETED
Start: 2024-01-21 | End: 2024-01-22

## 2024-01-21 RX ORDER — DOPAMINE HYDROCHLORIDE 160 MG/100ML
INJECTION, SOLUTION INTRAVENOUS
Status: COMPLETED
Start: 2024-01-21 | End: 2024-01-21

## 2024-01-21 RX ORDER — DOPAMINE HYDROCHLORIDE 160 MG/100ML
1-20 INJECTION, SOLUTION INTRAVENOUS CONTINUOUS
Status: DISCONTINUED | OUTPATIENT
Start: 2024-01-21 | End: 2024-01-22 | Stop reason: HOSPADM

## 2024-01-21 RX ADMIN — LEVOTHYROXINE SODIUM 125 MCG: 0.12 TABLET ORAL at 06:28

## 2024-01-21 RX ADMIN — PANTOPRAZOLE SODIUM 40 MG: 40 INJECTION, POWDER, FOR SOLUTION INTRAVENOUS at 08:06

## 2024-01-21 RX ADMIN — DOCUSATE SODIUM LIQUID 100 MG: 100 LIQUID ORAL at 08:05

## 2024-01-21 RX ADMIN — ACETAMINOPHEN 650 MG: 650 SOLUTION ORAL at 06:28

## 2024-01-21 RX ADMIN — ATORVASTATIN CALCIUM 40 MG: 40 TABLET, FILM COATED ORAL at 08:06

## 2024-01-21 RX ADMIN — CHLORHEXIDINE GLUCONATE 0.12% ORAL RINSE 15 ML: 1.2 LIQUID ORAL at 08:05

## 2024-01-21 RX ADMIN — ACETAMINOPHEN 650 MG: 650 SOLUTION ORAL at 00:15

## 2024-01-21 RX ADMIN — SODIUM CHLORIDE, PRESERVATIVE FREE 10 ML: 5 INJECTION INTRAVENOUS at 08:06

## 2024-01-21 RX ADMIN — PIPERACILLIN AND TAZOBACTAM 3375 MG: 3; .375 INJECTION, POWDER, FOR SOLUTION INTRAVENOUS at 18:49

## 2024-01-21 RX ADMIN — GADOTERIDOL 13 ML: 279.3 INJECTION, SOLUTION INTRAVENOUS at 18:46

## 2024-01-21 RX ADMIN — PIPERACILLIN AND TAZOBACTAM 3375 MG: 3; .375 INJECTION, POWDER, FOR SOLUTION INTRAVENOUS at 06:33

## 2024-01-21 RX ADMIN — IOPAMIDOL 75 ML: 755 INJECTION, SOLUTION INTRAVENOUS at 13:12

## 2024-01-21 RX ADMIN — DOPAMINE HYDROCHLORIDE 2.5 MCG/KG/MIN: 160 INJECTION, SOLUTION INTRAVENOUS at 12:03

## 2024-01-21 RX ADMIN — RIVAROXABAN 20 MG: 20 TABLET, FILM COATED ORAL at 08:06

## 2024-01-21 RX ADMIN — POLYETHYLENE GLYCOL (3350) 17 G: 17 POWDER, FOR SOLUTION ORAL at 08:05

## 2024-01-21 RX ADMIN — ACETAMINOPHEN 650 MG: 650 SOLUTION ORAL at 10:30

## 2024-01-21 ASSESSMENT — PULMONARY FUNCTION TESTS
PIF_VALUE: 17
PIF_VALUE: 18
PIF_VALUE: 20
PIF_VALUE: 17
PIF_VALUE: 21
PIF_VALUE: 20
PIF_VALUE: 17
PIF_VALUE: 17
PIF_VALUE: 20
PIF_VALUE: 17
PIF_VALUE: 20
PIF_VALUE: 17
PIF_VALUE: 21
PIF_VALUE: 17
PIF_VALUE: 17
PIF_VALUE: 26
PIF_VALUE: 22
PIF_VALUE: 17

## 2024-01-21 ASSESSMENT — PAIN SCALES - GENERAL
PAINLEVEL_OUTOF10: 0

## 2024-01-22 VITALS
SYSTOLIC BLOOD PRESSURE: 59 MMHG | OXYGEN SATURATION: 94 % | DIASTOLIC BLOOD PRESSURE: 38 MMHG | RESPIRATION RATE: 14 BRPM | HEART RATE: 67 BPM | TEMPERATURE: 98.1 F | WEIGHT: 132.72 LBS | BODY MASS INDEX: 25.06 KG/M2 | HEIGHT: 61 IN

## 2024-01-22 LAB
CULTURE: NORMAL
CULTURE: NORMAL
Lab: NORMAL
Lab: NORMAL
M PNEUMO IGG SER IA-ACNC: 1.23 U/L
M PNEUMO IGM SER IA-ACNC: 0.11 U/L
SPECIMEN: NORMAL
SPECIMEN: NORMAL

## 2024-01-22 PROCEDURE — 3E033XZ INTRODUCTION OF VASOPRESSOR INTO PERIPHERAL VEIN, PERCUTANEOUS APPROACH: ICD-10-PCS | Performed by: STUDENT IN AN ORGANIZED HEALTH CARE EDUCATION/TRAINING PROGRAM

## 2024-01-22 PROCEDURE — 2500000003 HC RX 250 WO HCPCS

## 2024-01-22 PROCEDURE — 6360000002 HC RX W HCPCS

## 2024-01-22 PROCEDURE — 2500000003 HC RX 250 WO HCPCS: Performed by: NURSE PRACTITIONER

## 2024-01-22 RX ORDER — NOREPINEPHRINE BITARTRATE 0.06 MG/ML
1-100 INJECTION, SOLUTION INTRAVENOUS CONTINUOUS
Status: DISCONTINUED | OUTPATIENT
Start: 2024-01-22 | End: 2024-01-22 | Stop reason: SDUPTHER

## 2024-01-22 RX ORDER — PROPOFOL 10 MG/ML
5-50 INJECTION, EMULSION INTRAVENOUS CONTINUOUS
Status: DISCONTINUED | OUTPATIENT
Start: 2024-01-22 | End: 2024-01-22 | Stop reason: HOSPADM

## 2024-01-22 RX ADMIN — PROPOFOL 20 MCG/KG/MIN: 10 INJECTION, EMULSION INTRAVENOUS at 00:16

## 2024-01-22 RX ADMIN — NOREPINEPHRINE BITARTRATE 5 MCG/MIN: 0.06 INJECTION, SOLUTION INTRAVENOUS at 00:00

## 2024-01-22 ASSESSMENT — PULMONARY FUNCTION TESTS: PIF_VALUE: 16

## 2024-01-22 NOTE — DISCHARGE SUMMARY
Bronchoscope was used to guide the ETT through the vocal cords and placed in the trachea. Patient in C-collar throughout the procedure  Adjuncts used: None   View of cords: Grade 1 view   ETT Size: 7.5 cuffed   Depth: 24 cm at the teeth   Confirmation: Colormetric change on ETCO2 detector x6, quantitative ETCO2   Secured with: ETT José   Complications: None   Intubation performed by:  Benjie Calvillo MD, Critical Care Attending    Additional Information: Patient seen and examined day of discharge. For more information regarding patient's care please contact [facility] medical records [phone]    Discharge Time of 75  minutes    Electronically signed by Benjie Calvillo MD on 1/22/2024 at 12:28 AM

## 2024-01-22 NOTE — PROCEDURES
Bronchoscopy Procedure Note   Indications: Endotracheal intubation and clearance of airway   Procedure: Bronchoscopy   Procedure Clinician: Benjie Calvillo MD   Procedure Assistant: None   Anesthesia: Etomidate and roccuronium  Procedure Details:   The patient was premedicated with propofol and fentanyl infusion. The patient was pre-oxygenated at 100% FiO2 and underwent fiberoptic bronchoscopy through the endotracheal tube. The airways were closely inspected and secretions were evacuated. The trachea is of normal caliber with normal appearing bronchial mucosa and anatomy. The milton is sharp. The tracheobronchial tree was then examined. Upon inspection of the right mainstem, right upper lobe was normal. Inspection of the bronchus intermedius, right middle lobe and right lower lobes were normal in appearance. Upon inspection of left mainstem bronchus, left upper lobe segments, lingula and left lower lobe bronchial segments were normal. There were no lesions. Thick white secretions noted in the right mainstem bronchus extending into all loves. Serial lavage perfromed.      Findings:   Mucous plugging of the right mainstem bronchus extending into all  lobes.   Complications: None; patient tolerated the procedure well.

## 2024-01-22 NOTE — PROCEDURES
Critical Care Intubation Note   Pre-Intubation       Reason for consultation: Acute respiratory failure , Hypoxia    Interventions prior to Anesthesiologist's arrival: IV access, AmbuBag     Procedure: Endotracheal intubation   [X] Airway equipment was checked.   [X] Suction available.   [X] Monitors including pulse oximetry, NIBP, and ECG monitoring in place or applied.   [X] The patient was preoxygenated.     Ventilation   Ventilation: Easy   Cricoid Pressure: Yes   Rapid sequence induction: Yes   Cervical collar present: yes   In line stabilization or cervical collar left in place: yes     Induction   Induction was performed with 15 mg of Etomidate, and 75 mg of Rocuronium for paralysis.   Induction was smooth with minimal changes in vital signs     Endotracheal Intubation   Intubation: was successful on 1 attempt.   Route: Oral   Blade: Video assisted MAC 3 for visualization of the vocal cords. Bronchoscope was used to guide the ETT through the vocal cords and placed in the trachea.  Adjuncts used: None   View of cords: Grade 1 view   ETT Size: 7.5 cuffed   Depth: 24 cm at the teeth   Confirmation: Colormetric change on ETCO2 detector x6, quantitative ETCO2   Secured with: ETT José   Complications: None   Intubation performed by:  Benjie Calvillo MD, Critical Care Attending

## 2024-01-22 NOTE — PROGRESS NOTES
CT cervical done due to lack of movement in extremities.  Erosive process seen at C2- osteo versus tumor.  Stat MRI cervical spine with and without contrast recommended.  Please notify me once MRI completed.  
    V2.0  [facility] [place]      Name:  Saravanan Cooley /Age/Sex: 1965  (58 y.o. male)   MRN & CSN:  [id number] & [id number] Encounter Date/Time: 2024 10:29 AM EST    Location:  -A PCP: Yohana Johnson MD       Hospital Day: 5      Subjective:     Chief Complaint:  Fatigue (Patient is from group home. Patient is MRDD usually gets up and goes to work but patient is fatigued and not wanting to get up as normal) and Cough     Pt was extubated. Now on 15 L NC.  Pt was not responding to questions, eyes open. Unable to obtain ROS.       Assessment and Plan:   Acute hypoxic respiratory failure 2/2 aspiration pneumonia/pneumonitis  Respiratory viral culture negative  CT chest/abd personally reviewed, scattered bibasilar infiltrates but showed focal dilation upper esophagus air-fluid level with mucosal thickening.   - Started IV Zosyn  - was intubated but extubated   - breathing tx      Stool impaction  Disimpaction in ER  Optimize bowel regimen     Hypotension. Likely 2/2 sedation. Currently on levophed requirement not going up.   No sign of shock.  - pressors per ICU    Bradycardia in junctional rhythm. 2/2 hypoxia?. S/p atropine dose.  Improved had another drop 30's, now on dopamine  - monitor on tele    MRDD/Down syndrome  Nonverbal at baseline  Resides at group home     Hypothyroidism  Continue Synthroid     History of PE/DVT  Continue home Xarelto      Personally reviewed Lab Studies and Imaging       EKG interpreted personally and results as above    Imaging that was interpreted personally includes CT chest/abd and results as above    Drugs that require monitoring for toxicity include Xarelto and the method of monitoring was cbc      Diet ADULT TUBE FEEDING; Nasogastric; Peptide Based; Continuous; 25; Yes; 10; Q 4 hours; 55; 90; Q 4 hours   DVT Prophylaxis [] Lovenox, []  Heparin, [] SCDs, [] Ambulation,  [] Eliquis, [x] Xarelto  [] Coumadin   Code Status Full Code   Disposition 
   01/18/24 1344   Encounter Summary   Encounter Overview/Reason  Attempted Encounter   Service Provided For: Patient not available   Last Encounter  01/18/24  (patient was not available at the time of visit.)   Begin Time 1200   End Time  1210   Total Time Calculated 10 min   Assessment/Intervention/Outcome   Assessment Unable to assess   Intervention Sustaining Presence/Ministry of presence   Plan and Referrals   Plan/Referrals Continue Support (comment)  (as needed)       
   01/18/24 1408   Encounter Summary   Encounter Overview/Reason  Follow-up   Service Provided For: Patient   [facility]   Support System Spouse;Children   Last Encounter  01/18/24  (Patient wanted to talk. This  provided active lsitening and affirmations. Patient requested prayer support. Provided.)   Complexity of Encounter Low   Begin Time 1350   End Time  1411   Total Time Calculated 21 min   Spiritual/Emotional needs   Type Spiritual Support   Grief, Loss, and Adjustments   Type Adjustment to illness;Life Adjustments   Assessment/Intervention/Outcome   Assessment Coping   Intervention Confronted/Challenged;Empowerment;Sustaining Presence/Ministry of presence   Outcome Encouraged;Engaged in conversation;Expressed Gratitude;Expressed regrets;Receptive;Less anxious, Less agitated       
  Facility/Department: [facility] EMERGENCY DEPARTMENT   CLINICAL BEDSIDE SWALLOW EVALUATION    NAME: Saravanan Cooley  : 1965  MRN: [id number]    IMPRESSIONS AND RECOMMENDATIONS: Saravanan Cooley was referred for a bedside swallow evaluation following admission to [facility] with acute respiratory failure with hypoxia, possible aspiration pneumonitis. Medical hx includes developmental intellectual disability, Down syndrome, hypothyroidism. No known history of dysphagia prior to admission.    Pt seen for evaluation seated upright in bed, head forward and rotated to L, awake. He did not follow any commands or produce any vocalizations. He accepted limited PO trials of thin liquids via straw and puree. He expectorated attempted trials of ice chips and solids. Oral stage moderately impaired, characterized by weak labial seal, lingual protrusion/pumping and adequate clearance with given consistencies. Assessment of oral clearance limited by pt's positioning. Pharyngeal stage appears grossly intact without s/s aspiration.    Recommend initiation of pureed diet/thin liquids via straw. Give pills crushed as able in pureed or pudding consistency. Position pt upright as possible for all PO and total feed. SLP will follow. Recommendations d/w RN.      ADMISSION DATE: 2024  ADMITTING DIAGNOSIS: has Down's Syndrome; Hypothyroidism; Mutism; Chronic anticoagulation; History of pulmonary embolism; Intellectual disability; Overweight (BMI 25.0-29.9); Vitamin D insufficiency; Empty sella (HCC); History of CVA in adulthood; History of DVT of lower extremity; Lives in group home; Down syndrome; and Pneumonia due to organism on their problem list.  ONSET DATE: this admission    Recent Chest Xray/CT of Chest: see chart    Date of Eval: 2024  Evaluating Therapist: MERRILL Quintanilla    Current Diet level:  Current Diet : NPO  Current Liquid Diet : NPO    Primary Complaint  does not 
  Physician Progress Note      PATIENT:               ANUSHA RECINOS  CSN #:                  [id number]  :                       1965  ADMIT DATE:       2024 4:53 PM  DISCH DATE:  RESPONDING  PROVIDER #:        Nasim Salazar DO          QUERY TEXT:    Internal Medicine,    Pt admitted with acute resp failure due to suspected aspiration PNA and noted    to have  HR over 100 and elevated WBC.. If possible, please document in the   progress notes and discharge summary if you are evaluating and /or treating   any of the following:]    The medical record reflects the following:  Risk Factors:  aspiration PNA  Clinical Indicators: cute resp failure due to suspected aspiration PNA,   intubated on vent in ED, SIRS criteria: O2 sats in 80's, WBC 11.3, RR verified   up to 27, SBP 84/60-88/66  Treatment: labs, imaging, mechanical vent, levophed, IVF, IV atb, supportive   care    Thank you,  Gunjan Leblanc RN CDS  [phone]  Options provided:  -- Sepsis, present on admission  -- Sepsis, developed following admission  -- Sepsis was ruled out  -- Other - I will add my own diagnosis  -- Disagree - Not applicable / Not valid  -- Disagree - Clinically unable to determine / Unknown  -- Refer to Clinical Documentation Reviewer    PROVIDER RESPONSE TEXT:    This patient has *** (organism if known) sepsis that developed following   admission.    Query created by: Gunjan Leblanc on 2024 1:24 PM      Electronically signed by:  Nasim Salazar DO 2024 9:25 AM          
[facility] called at this time to attempt to get information for MRI screening. Unsuccessful at this time. States to call [facility] at [phone] or [phone]. Attempted to call both of these numbers at this time. The [phone] is disconnected and the other number is only available on Monday through Friday. Called MRI at this time to notify. Pt will need x-rays completed in order to complete MRI.   
Attempted to place NG tube per Dr. Salazar. Every time the NG tube was advanced, pt would vagal down and HR dropped to the 30's. While attempting to place NG tube, a popping sound was heard. This RN and Bartolo RN at bedside. Dr. Salazar and Tahira PA notified. Orders placed for CT of head and cervical spine. C-collar placed at this time. While monitoring, pt still unable to use bilateral hands. Grimaces to pain but unable to move them. When moved for him, they fall flaccid to the bed. Pt will spontaneously move feet and legs when touched. Pt does not follow commands at baseline and is non-verbal.   
Attempted to place a NG/OG. Not successful.  
Awaiting radiology read.  MRI shows upper cervical spinal cord edema.  There is also enhancing lesion around the spinal cord at the cervicomedullary junction.  Suspicion for abscess.    Patient will need surgical decompression and occipitocervical fusion.  This is a high risk procedure given the location of the pathology.  I recommend transferring him to [facility].  He is to maintain strict cervical spine precautions during transport.        
Bagged and assisted Dr Romo intubate pt with 7.5 ETT to 21 @lip. Bilateral breath sounds heard throughout both lung fields, color change noted on EZCAP, secured with commercial tube dangelo and placed on vent with settings of AC/14/450/5/100%.  
Case discussed with Dr Ramey. Patient has unstable cervical spine with cord edema. Enhancing lesion around the spinal cord. Needing transfer. Transfer center called    [facility] called and consent for transfer obtained (Farideh Anders)  
Comprehensive Nutrition Assessment    Type and Reason for Visit:  Reassess    Nutrition Recommendations/Plan:   Advance TF- Vital AF 1.2 (peptide based formula) @ 55mL/hr continuous w/ 90mL FWF Q4H to provide 1584kcal, 99g PRO, and 1610mL fluids per day   Monitor weights, GI status, glucose, lytes, HOB, POC     Malnutrition Assessment:  Malnutrition Status:  Moderate malnutrition (01/19/24 1514)    Context:  Social/Environmental Circumstances     Findings of the 6 clinical characteristics of malnutrition:  Energy Intake:  Unable to assess  Weight Loss:  Greater than 10% over 6 months     Body Fat Loss:  Mild body fat loss Orbital, Triceps   Muscle Mass Loss:  No significant muscle mass loss    Fluid Accumulation:  No significant fluid accumulation     Strength:  Not Performed    Nutrition Assessment:    Pt remains intubated, awake in bed, + 1 pressor, MAP 79. TF running at 30ml in room. Pt did not respond to questions but appears comfortable. NFPE performed, mild fat wasting noted, now meets criteria for malnutrition w/ wt loss. Please continue to advance TF to goal. Continue to follow at high risk.    Nutrition Related Findings:    +levo; glycolax, colace; glucose 159, mg 2.7, P 2.0 Wound Type: Pressure Injury, Stage I       Current Nutrition Intake & Therapies:    Average Meal Intake: NPO  Average Supplements Intake: NPO  ADULT TUBE FEEDING; Nasogastric; Peptide Based; Continuous; 10; Yes; 10; Q 4 hours; 40; 30; Q 4 hours  Current Tube Feeding (TF) Orders:  Feeding Route: Nasogastric  Formula: Peptide Based  Schedule: Continuous  Feeding Regimen: 30  Additives/Modulars: None  Water Flushes: 30 Q4H  Current TF & Flush Orders Provides: 864kcal, 54g PRO, 764ml fluids  Goal TF & Flush Orders Provides: Vital AF 1.2 (peptide based formula) @ 55mL/hr continuous w/ 90mL FWF Q4H to provide 1584kcal, 99g PRO, and 1610mL fluids per day  Additional Calorie Sources:  propofol stopped    Anthropometric Measures:  Height: 
Following extubation, little/no movement noted upper/lower extremities without reflexes. Stat CT neck suggest odontoid osteomyelitis/abscess. Stat MRI with/without contrast ordered, Dr Ramey contacted, I directly spoke with him. Further input from him pending MRI review. If patient requires surgical intervention, Rx with Kcentra since anticoagulation with Apixaban long term anticoagulation DVT/PE    E Cordasco  [phone]  
NIFF -13, RSBI 76. Notified Dr. Salazar. States okay to extubate at this time.   
Notified Tahira PA that calcium is 6.9.   
Patient accepted at [facility]. Pending bed assignment.     
Patient extubated at 1105 per verbal orders from Dr. Salazar. Patient had a positive cuff leak, RSBI of 77 and a NIF of -13. Patient was extubated to HFNC on 6L, SpO2 98%. Patient tolerating well. Dia RN at bedside with patient. Titrate O2 as needed. Will continue to monitor.     01/21/24 1056   Patient Observation   Pulse 72   Respirations 23   SpO2 98 %   Vent Information   Vent Mode CPAP/PS   Ventilator Settings   FiO2  50 %   PEEP/CPAP (cmH2O) 5   Vent Patient Data (Readings)   Vt (Measured) 334 mL   Peak Inspiratory Pressure (cmH2O) 17 cmH2O   Rate Measured 26 br/min   Minute Volume (L/min) 9 Liters   Mean Airway Pressure (cmH2O) 10 cmH20   Plateau Pressure (cm H2O) 0 cm H2O   Driving Pressure -5   I:E Ratio 1:1.10   Backup Apnea On   Backup Rate 16 Breaths Per Minute   Backup Vt 450   Vent Alarm Settings   High Pressure (cmH2O) 40 cmH2O   Low Minute Volume (lpm) 2.5 L/min   High Minute Volume (lpm) 20 L/min   Low Exhaled Vt (ml) 0.33 mL   High Exhaled Vt (ml) 1000 mL   RR High (bpm) 40 br/min   Apnea (secs) 20 secs   Additional Respiratoray Assessments   Humidification Source HME   Ambu Bag With Mask At Bedside Yes   ETT    Placement Date/Time: 01/18/24 1207   Placement Verified By: Chest X-ray  Location: Oral  Insertion attempts: 1  Secured At: 21 cm  Atraumatic: No   Secured At 21 cm   Measured From Lips   ETT Placement Right   Secured By Commercial tube dangelo   Site Assessment Cool;Dry   B: Both Spontaneous Awakening and Breathing Trials   Safety Screening Spontaneous Breathing Trial (SBT) Proceed with SBT - no exclusion criteria met   RT Notified Ready for SBT Yes   Spontaneous  mL   RSBI Calculated 77.84   Spontaneous Breathing Trial (SBT) Outcome SBT Passed   Patient Meet Extubation Criteria Yes   Provider Ordered Extubation Yes   Weaning Parameters   Spontaneous Breathing Trial Complete Yes      NIF -13       
Patient was rapid response. Patient placed on ventilator. Patient's on-call guardian  Malik Kuhn contacted and updated plan of care. Confirmed full code.   
Per AM assessment, pt unable to move arms. Grimaces to pain but does not move arms spontaneously. Pt does not follow commands at baseline. Able to spontaneously move feet slightly.  
Pt HR dropped down to the 30's. Dr. Salazar notified. New orders placed at this time.   
Pt back from MRI. Dr. Martin notified that MRI is completed.   
Pt intubated with 15 of Etomidate and 70 of Joon. Ett 24 at lip  
Pt returned from CT.   
Pt switched over to SPONT mode on vent per Patti w/ RT. Pt maintain saturations and RR are 17.   
Pt transferred to [facility] main. Report given to RN for med flight.   
Pt transported down for CT at this time.   
Pt transported down to MRI at this time.   
Respiratory notified. Small cuff leak noted and pt is having periods of apnea on the vent.   
[facility]  DEPARTMENT OF SPEECH/LANGUAGE PATHOLOGY    Saravanan Cooley  1/18/2024  [id number]    Noted change in status and that Saravanan Cooley is now intubated on mechanical ventilation. SLP will sign off. Please reconsult s/p extubation if/when appropriate.    Patti Chu, SLP  1/18/2024  3:22 PM    
significantly redundant, extends to the left upper abdomen. The remaining large bowel loops are not distended. 2. The urinary bladder is markedly distended, extends to the lower abdomen. 3. There is focal dilatation of the upper esophagus containing air-fluid level. There is mucosal thickening and mild dilatation of the distal esophagus. 4. Low lung volumes with interstitial opacities in the posterior right upper lobe, could represent atelectasis or scarring. Atelectatic changes are seen in the lung bases, right greater than left. 5. Cholelithiasis, no inflammatory changes. 6. Severe thoracolumbar S-shaped scoliosis.     CT HEAD WO CONTRAST    Result Date: 1/17/2024  EXAMINATION: CT OF THE HEAD WITHOUT CONTRAST  1/17/2024 9:39 pm TECHNIQUE: CT of the head was performed without the administration of intravenous contrast. Automated exposure control, iterative reconstruction, and/or weight based adjustment of the mA/kV was utilized to reduce the radiation dose to as low as reasonably achievable. COMPARISON: 12/04/2023 HISTORY: Episode of unresponsiveness. FINDINGS: BRAIN/VENTRICLES: No acute intracranial hemorrhage, mass effect, or midline shift.  No abnormal extra-axial fluid collection.  The gray-white differentiation is maintained without evidence of an acute infarct.  Stable parenchymal volume loss and prominence of the ventricles. ORBITS: The visualized portion of the orbits demonstrate no acute abnormality. SINUSES: The visualized paranasal sinuses and mastoid air cells demonstrate no acute abnormality. SOFT TISSUES/SKULL:  No acute abnormality.  Cerumen within the external auditory canals, right greater than left.     No acute abnormality.     XR CHEST PORTABLE    Result Date: 1/17/2024  EXAMINATION: ONE XRAY VIEW OF THE CHEST 1/17/2024 5:35 pm COMPARISON: Chest x-ray dated December 4, 2023 HISTORY: ORDERING SYSTEM PROVIDED HISTORY: cough, lethargy TECHNOLOGIST PROVIDED HISTORY: Reason for exam:->cough, lethargy 
and symptoms: na Relevant Medical/Surgical History: na FINDINGS: Normal cardiomediastinal silhouette.  Left basilar airspace disease.  No pneumothorax or pleural effusion     Left basilar airspace disease which could represent atelectasis and/or pneumonia       Recent Results (from the past 24 hour(s))   Lactic Acid    Collection Time: 01/20/24 12:54 PM   Result Value Ref Range    Lactate 1.2 0.5 - 1.9 mMOL/L   Critical Care Panel    Collection Time: 01/20/24 12:56 PM   Result Value Ref Range    Sodium 141 135 - 145 MMOL/L    Potassium 4.0 3.5 - 5.1 MMOL/L    Chloride 105 99 - 110 mMol/L    CO2 28 21 - 32 MMOL/L    Anion Gap 8 7 - 16    Glucose 112 (H) 70 - 99 MG/DL    BUN 14 6 - 23 MG/DL    Creatinine 0.6 (L) 0.9 - 1.3 MG/DL    Est, Glom Filt Rate >60 >60 mL/min/1.73m2    Calcium 7.1 (L) 8.3 - 10.6 MG/DL    Phosphorus 1.9 (L) 2.5 - 4.9 MG/DL    Magnesium 2.8 (H) 1.8 - 2.4 mg/dl   Calcium, Ionized    Collection Time: 01/20/24 12:56 PM   Result Value Ref Range    Ionized Ca 1.11 (L) 1.12 - 1.32 mMOL/L    Calcium, Ionized 4.44 (L) 4.48 - 5.28 MG/DL   Hepatic Function Panel    Collection Time: 01/20/24 12:56 PM   Result Value Ref Range    Albumin 2.4 (L) 3.4 - 5.0 GM/DL    Total Bilirubin 0.4 0.0 - 1.0 MG/DL    Bilirubin, Direct 0.3 0.0 - 0.3 MG/DL    Bilirubin, Indirect 0.1 0 - 0.7 MG/DL    Alkaline Phosphatase 74 40 - 129 IU/L    AST 21 15 - 37 IU/L    ALT 26 10 - 40 U/L    Total Protein 4.8 (L) 6.4 - 8.2 GM/DL   Critical Care Panel    Collection Time: 01/20/24  7:18 PM   Result Value Ref Range    Sodium 140 135 - 145 MMOL/L    Potassium 3.8 3.5 - 5.1 MMOL/L    Chloride 105 99 - 110 mMol/L    CO2 28 21 - 32 MMOL/L    Anion Gap 7 7 - 16    Glucose 133 (H) 70 - 99 MG/DL    BUN 15 6 - 23 MG/DL    Creatinine 0.5 (L) 0.9 - 1.3 MG/DL    Est, Glom Filt Rate >60 >60 mL/min/1.73m2    Calcium 7.0 (L) 8.3 - 10.6 MG/DL    Phosphorus 1.8 (L) 2.5 - 4.9 MG/DL    Magnesium 2.5 (H) 1.8 - 2.4 mg/dl   Critical Care Panel    Collection 
than right.  Follow up to resolution is suggested.     CT CHEST ABDOMEN PELVIS W CONTRAST Additional Contrast? None    Result Date: 1/17/2024  EXAMINATION: CT OF THE CHEST, ABDOMEN, AND PELVIS WITH CONTRAST 1/17/2024 9:40 pm TECHNIQUE: CT of the chest, abdomen and pelvis was performed with the administration of intravenous contrast. Multiplanar reformatted images are provided for review. Automated exposure control, iterative reconstruction, and/or weight based adjustment of the mA/kV was utilized to reduce the radiation dose to as low as reasonably achievable. COMPARISON: None HISTORY: ORDERING SYSTEM PROVIDED HISTORY: hypoxia TECHNOLOGIST PROVIDED HISTORY: Reason for exam:->hypoxia Additional Contrast?->None Decision Support Exception - unselect if not a suspected or confirmed emergency medical condition->Emergency Medical Condition (MA) Reason for Exam: Fatigue; Cough FINDINGS: Chest: Mediastinum: No lymphadenopathy.  The aorta is normal in caliber and course. The heart is slightly enlarged.  No pericardial effusion.  There is focal dilatation of the upper esophagus containing air-fluid level.  There is mucosal thickening and mild dilatation of the distal esophagus. Lungs/pleura: The trachea and mainstem bronchi are patent.  There are low lung volumes.  There are interstitial opacities in the posterior right upper lobe, could represent atelectasis or scarring.  Atelectatic changes are seen in the lung bases, right greater than the left.  No pleural effusion.  No pneumothorax.  No airspace disease. Soft Tissues/Bones: No acute bony abnormalities.  There is cervicothoracic S-shaped scoliosis. Abdomen/Pelvis: Organs: Liver: Normal in contour and attenuation. No focal lesion. No biliary dilatation. Gallbladder: Cholelithiasis, no inflammatory changes. Pancreas: Unremarkable, no focal lesions or peripancreatic collections. Spleen: Normal in attenuation and size. Adrenal glands: Unremarkable. No nodules. Kidneys: The 
+7        Electronically signed by Nasim Salazar Jr, DO on 1/19/2024 at 9:17 AM  
1/17/2024  EXAMINATION: CT OF THE HEAD WITHOUT CONTRAST  1/17/2024 9:39 pm TECHNIQUE: CT of the head was performed without the administration of intravenous contrast. Automated exposure control, iterative reconstruction, and/or weight based adjustment of the mA/kV was utilized to reduce the radiation dose to as low as reasonably achievable. COMPARISON: 12/04/2023 HISTORY: Episode of unresponsiveness. FINDINGS: BRAIN/VENTRICLES: No acute intracranial hemorrhage, mass effect, or midline shift.  No abnormal extra-axial fluid collection.  The gray-white differentiation is maintained without evidence of an acute infarct.  Stable parenchymal volume loss and prominence of the ventricles. ORBITS: The visualized portion of the orbits demonstrate no acute abnormality. SINUSES: The visualized paranasal sinuses and mastoid air cells demonstrate no acute abnormality. SOFT TISSUES/SKULL:  No acute abnormality.  Cerumen within the external auditory canals, right greater than left.     No acute abnormality.     XR CHEST PORTABLE    Result Date: 1/17/2024  EXAMINATION: ONE XRAY VIEW OF THE CHEST 1/17/2024 5:35 pm COMPARISON: Chest x-ray dated December 4, 2023 HISTORY: ORDERING SYSTEM PROVIDED HISTORY: cough, lethargy TECHNOLOGIST PROVIDED HISTORY: Reason for exam:->cough, lethargy Reason for Exam: cough, lethargy Additional signs and symptoms: na Relevant Medical/Surgical History: na FINDINGS: Normal cardiomediastinal silhouette.  Left basilar airspace disease.  No pneumothorax or pleural effusion     Left basilar airspace disease which could represent atelectasis and/or pneumonia       Electronically signed by Soto Naylor MD on 1/19/2024 at 10:35 AM

## 2024-01-23 LAB
CULTURE: ABNORMAL
CULTURE: ABNORMAL
GRAM SMEAR: ABNORMAL
Lab: ABNORMAL
SPECIMEN: ABNORMAL

## 2024-05-07 NOTE — TELEPHONE ENCOUNTER
Yasmine doesn't know when patient passed away she was just notified yesterday from the  home that he passed away

## (undated) DEVICE — Z DISCONTINUED (USE MFG CAT MVABO)  TUBING GAS SAMPLING STD 6.5 FT FEMALE CONN SMRT CAPNOLINE

## (undated) DEVICE — Z DISCONTINUED NO SUB IDED TUBING ETCO2 AD L6.5FT NSL ORAL CVD PRNG NONFLARED TIP OVR

## (undated) DEVICE — ENDOSCOPY KIT: Brand: MEDLINE INDUSTRIES, INC.